# Patient Record
Sex: FEMALE | Race: WHITE | Employment: OTHER | URBAN - METROPOLITAN AREA
[De-identification: names, ages, dates, MRNs, and addresses within clinical notes are randomized per-mention and may not be internally consistent; named-entity substitution may affect disease eponyms.]

---

## 2017-02-01 DIAGNOSIS — E78.5 HYPERLIPIDEMIA LDL GOAL <130: ICD-10-CM

## 2017-02-13 ENCOUNTER — HOSPITAL ENCOUNTER (OUTPATIENT)
Dept: LAB | Age: 67
Discharge: HOME | End: 2017-02-13

## 2017-02-13 PROCEDURE — 99001 SPECIMEN HANDLING PT-LAB: CPT | Performed by: INTERNAL MEDICINE

## 2017-02-14 LAB
ALBUMIN SERPL-MCNC: 4.2 G/DL (ref 3.6–4.8)
ALBUMIN/GLOB SERPL: 1.7 {RATIO} (ref 1.1–2.5)
ALP SERPL-CCNC: 73 IU/L (ref 39–117)
ALT SERPL-CCNC: 11 IU/L (ref 0–32)
AST SERPL-CCNC: 16 IU/L (ref 0–40)
BILIRUB SERPL-MCNC: 0.5 MG/DL (ref 0–1.2)
BUN SERPL-MCNC: 15 MG/DL (ref 8–27)
BUN/CREAT SERPL: 20 (ref 11–26)
CALCIUM SERPL-MCNC: 9.7 MG/DL (ref 8.7–10.3)
CHLORIDE SERPL-SCNC: 101 MMOL/L (ref 96–106)
CHOLEST SERPL-MCNC: 209 MG/DL (ref 100–199)
CO2 SERPL-SCNC: 25 MMOL/L (ref 18–29)
CREAT SERPL-MCNC: 0.76 MG/DL (ref 0.57–1)
GLOBULIN SER CALC-MCNC: 2.5 G/DL (ref 1.5–4.5)
GLUCOSE SERPL-MCNC: 95 MG/DL (ref 65–99)
HDLC SERPL-MCNC: 55 MG/DL
INTERPRETATION, 910389: NORMAL
LDLC SERPL CALC-MCNC: 137 MG/DL (ref 0–99)
POTASSIUM SERPL-SCNC: 3.8 MMOL/L (ref 3.5–5.2)
PROT SERPL-MCNC: 6.7 G/DL (ref 6–8.5)
SODIUM SERPL-SCNC: 145 MMOL/L (ref 134–144)
TRIGL SERPL-MCNC: 85 MG/DL (ref 0–149)
VLDLC SERPL CALC-MCNC: 17 MG/DL (ref 5–40)

## 2017-02-20 ENCOUNTER — OFFICE VISIT (OUTPATIENT)
Dept: INTERNAL MEDICINE CLINIC | Age: 67
End: 2017-02-20

## 2017-02-20 VITALS
HEART RATE: 71 BPM | RESPIRATION RATE: 12 BRPM | BODY MASS INDEX: 28.67 KG/M2 | HEIGHT: 66 IN | WEIGHT: 178.4 LBS | SYSTOLIC BLOOD PRESSURE: 144 MMHG | OXYGEN SATURATION: 98 % | TEMPERATURE: 98.9 F | DIASTOLIC BLOOD PRESSURE: 78 MMHG

## 2017-02-20 DIAGNOSIS — E03.9 ACQUIRED HYPOTHYROIDISM: ICD-10-CM

## 2017-02-20 DIAGNOSIS — I10 ESSENTIAL HYPERTENSION WITH GOAL BLOOD PRESSURE LESS THAN 140/90: Primary | ICD-10-CM

## 2017-02-20 DIAGNOSIS — N39.490 OVERFLOW INCONTINENCE: ICD-10-CM

## 2017-02-20 DIAGNOSIS — E78.5 HYPERLIPIDEMIA LDL GOAL <130: ICD-10-CM

## 2017-02-20 RX ORDER — HYDROCODONE BITARTRATE AND ACETAMINOPHEN 10; 325 MG/1; MG/1
1 TABLET ORAL
Qty: 40 TAB | Refills: 0 | Status: SHIPPED | OUTPATIENT
Start: 2017-02-20 | End: 2018-02-21 | Stop reason: SDUPTHER

## 2017-02-20 RX ORDER — AMLODIPINE BESYLATE 5 MG/1
TABLET ORAL
Qty: 90 TAB | Refills: 3 | Status: SHIPPED | OUTPATIENT
Start: 2017-02-20 | End: 2018-04-11 | Stop reason: SDUPTHER

## 2017-02-20 NOTE — MR AVS SNAPSHOT
Visit Information Date & Time Provider Department Dept. Phone Encounter #  
 2/20/2017  2:30 PM Marilyn De Oliveira MD Internist of [address] [id number] Your Appointments 8/2/2017  9:25 AM  
LAB with [facility] NURSE VISIT Internist of [facility] ([facility]) Appt Note: labs for rpe rm  
 [address] [facility], [address] [place] [address] [place] [address]  
  
   
 [address], [address]  
  
    
 8/9/2017  3:00 PM  
PHYSICAL with Marilyn De Oliveira MD  
Internist of [address] [place] [address]) Appt Note: rpe 6mos.,rm  
 [address] [facility], [address] [address] [address] [facility] [address] [place] [address]  
  
   
 [address], [address]  
  
    
  
 3/6/2017  2:15 PM  
Any with Sunnie Kussmaul, MD  
[facility] of [facility] ([facility]) Appt Note: 3 MONTH FOLLOW UP  
 [address] 
[address] [place] [address]  
[address] [facility] [address] [place] [address] Upcoming Health Maintenance Date Due DTaP/Tdap/Td series (1 - Tdap) 1/10/1969 Pneumococcal 65+ Low/Medium Risk (2 of 2 - PCV13) 5/23/2015 INFLUENZA AGE 9 TO ADULT 8/1/2016 MEDICARE YEARLY EXAM 12/22/2016 GLAUCOMA SCREENING Q2Y 3/11/2017 BREAST CANCER SCRN MAMMOGRAM 11/2/2018 COLONOSCOPY 2/21/2022 Allergies as of 2/20/2017  Review Complete On: 2/20/2017 By: Marilyn De Oliveira MD  
 No Known Allergies Current Immunizations  Reviewed on 6/6/2016 Name Date Pneumococcal Polysaccharide (PPSV-23) 5/23/2014  2:29 PM  
 Zoster 4/13/2009 Not reviewed this visit You Were Diagnosed With   
  
 Codes Comments Essential hypertension with goal blood pressure less than 140/90    -  Primary ICD-10-CM: I10 
ICD-9-CM: 401.9 Hyperlipidemia LDL goal <130     ICD-10-CM: E78.5 ICD-9-CM: 272.4 Overflow incontinence     ICD-10-CM: N39.490 ICD-9-CM: 788.38 Vitals BP Pulse Temp Resp Height(growth percentile) Weight(growth percentile) 144/78 71 98.9 °F (37.2 °C) (Oral) 12 5' 6\" (1.676 m) 178 lb 6.4 oz (80.9 kg) SpO2 BMI OB Status Smoking Status 98% 28.79 kg/m2 Hysterectomy Never Smoker Vitals History BMI and BSA Data Body Mass Index Body Surface Area 28.79 kg/m 2 1.94 m 2 Preferred Pharmacy Pharmacy Name Phone [address], [facility] [address] [place] [address] [facility] [facility] [phone] Your Updated Medication List  
  
   
This list is accurate as of: 2/20/17  3:18 PM.  Always use your most recent med list.  
  
  
  
  
 ALEVE 220 mg tablet Generic drug:  naproxen sodium Take 220 mg by mouth two (2) times daily (with meals). amLODIPine 5 mg tablet Commonly known as:  Salvador Chafe TAKE 1 TABLET BY MOUTH DAILY  
  
 aspirin delayed-release 81 mg tablet Take  by mouth daily. hydroCHLOROthiazide 25 mg tablet Commonly known as:  HYDRODIURIL  
TAKE 1 TABLET DAILY HYDROcodone-acetaminophen  mg tablet Commonly known as:  Lakshmi Kris Take 1 Tab by mouth every six (6) hours as needed for Pain. Max Daily Amount: 4 Tabs. * levothyroxine 112 mcg tablet Commonly known as:  SYNTHROID  
112 mcg. * levothyroxine 112 mcg tablet Commonly known as:  SYNTHROID  
TAKE 1 TABLET DAILY BEFORE BREAKFAST  
  
 pravastatin 40 mg tablet Commonly known as:  PRAVACHOL  
TAKE 1 TABLET NIGHTLY  
  
 sertraline 100 mg tablet Commonly known as:  ZOLOFT  
TAKE 1 TABLET EVERY DAY  
  
 tolterodine ER 4 mg ER capsule Commonly known as:  Katherene Sindhu Take 1 Cap by mouth daily. VITAMIN C 500 mg tablet Generic drug:  ascorbic acid (vitamin C) Take  by mouth. * Cholecalciferol (Vitamin D3) 2,000 unit Cap capsule Commonly known as:  VITAMIN D3 Take  by Mouth Once a Day. * VITAMIN D3 1,000 unit tablet Generic drug:  cholecalciferol Take 2,000 Units by mouth daily. vitamin e 1,000 unit capsule Commonly known as:  E GEMS Take 1,000 Units by mouth daily. ZyrTEC 10 mg tablet Generic drug:  cetirizine Take  by mouth daily. * Notice: This list has 4 medication(s) that are the same as other medications prescribed for you. Read the directions carefully, and ask your doctor or other care provider to review them with you. Prescriptions Printed Refills HYDROcodone-acetaminophen (NORCO)  mg tablet 0 Sig: Take 1 Tab by mouth every six (6) hours as needed for Pain. Max Daily Amount: 4 Tabs. Class: Print Route: Oral  
  
Introducing [facility]! Dear Jyoti Lincoln: 
Thank you for requesting a [facility] account. Our records indicate that you already have an active [facility] account. You can access your account anytime at [email or web address] Did you know that you can access your hospital and ER discharge instructions at any time in [facility]? You can also review all of your test results from your hospital stay or ER visit. Additional Information If you have questions, please visit the Frequently Asked Questions section of the [facility] website at [email or web address] Remember, [facility] is NOT to be used for urgent needs. For medical emergencies, dial 911. Now available from your iPhone and Android! Please provide this summary of care documentation to your next provider. Your primary care clinician is listed as Gali Elkins. If you have any questions after today's visit, please call [phone].

## 2017-02-20 NOTE — PROGRESS NOTES
Claudell Overcast 1950, is a 69 y.o. female, who is seen today for reevaluation of hypertension hyperlipidemia anxiety and now also back pain and urinary incontinence. She has been seeing a urologist for over 6 months for urinary incontinence and has tried Botox and pills with minimal benefit. She thinks her blood pressure medicine, hydrochlorothiazide, may contribute to the continuous urinary incontinence. She is no longer on amlodipine, that was just taken preop. She had good results from her back surgery but still some pain from time to time and would like to use occasional half of the Norco.  She was prescribed that by her surgeon. She is following a healthy diet and taking her other medicine correctly. She otherwise feels well. Past Medical History   Diagnosis Date    Allergic rhinitis     Atrophic vaginitis     Back pain     Cystitis     Epigastric pain     Headache(784.0)      migraine    Hypercholesterolemia     Hypertension     Neck pain     Numbness of arm 12/18/06     left    LOREN (obstructive sleep apnea)     Phlebitis of saphenous vein 4/27/07     Left    Thyroid disease      hypothyroidism    Urinary incontinence      Current Outpatient Prescriptions   Medication Sig Dispense Refill    Cholecalciferol, Vitamin D3, (VITAMIN D3) 2,000 unit cap capsule Take  by Mouth Once a Day.  levothyroxine (SYNTHROID) 112 mcg tablet 112 mcg.  hydrochlorothiazide (HYDRODIURIL) 25 mg tablet TAKE 1 TABLET DAILY 90 Tab 3    levothyroxine (SYNTHROID) 112 mcg tablet TAKE 1 TABLET DAILY BEFORE BREAKFAST 90 Tab 3    sertraline (ZOLOFT) 100 mg tablet TAKE 1 TABLET EVERY DAY 90 Tab 3    pravastatin (PRAVACHOL) 40 mg tablet TAKE 1 TABLET NIGHTLY 90 Tab 3    aspirin delayed-release 81 mg tablet Take  by mouth daily.  vitamin e (E GEMS) 1,000 unit capsule Take 1,000 Units by mouth daily.       naproxen sodium (ALEVE) 220 mg tablet Take 220 mg by mouth two (2) times daily (with meals).  cholecalciferol, vitamin d3, (VITAMIN D) 1,000 unit tablet Take 2,000 Units by mouth daily.  amLODIPine (NORVASC) 5 mg tablet TAKE 1 TABLET BY MOUTH DAILY 90 Tab 0    HYDROcodone-acetaminophen (NORCO)  mg tablet Take 1-2 Tabs by mouth every six (6) hours as needed for Pain. Max Daily Amount: 8 Tabs. 40 Tab 0    HYDROcodone-acetaminophen (NORCO) 5-325 mg per tablet Take 1 Tab by mouth every six (6) hours as needed for Pain. Max Daily Amount: 4 Tabs. 40 Tab 0    tolterodine ER (DETROL LA) 4 mg ER capsule Take 1 Cap by mouth daily. 90 Cap 3    ascorbic acid (VITAMIN C) 500 mg tablet Take  by mouth.  cetirizine (ZYRTEC) 10 mg tablet Take  by mouth daily. Visit Vitals    /78    Pulse 71    Temp 98.9 °F (37.2 °C) (Oral)    Resp 12    Ht 5' 6\" (1.676 m)    Wt 178 lb 6.4 oz (80.9 kg)    SpO2 98%    BMI 28.79 kg/m2     Carotids are 2+ without bruits. Lungs are clear to percussion. Good breath sounds with no wheezing or crackles. Heart reveals a regular rhythm with normal S1 and S2 no murmur gallop click or rub. Apical impulse is not palpable. Abdomen is soft and nontender with no hepatosplenomegaly or masses and no bruits. Extremities reveal no clubbing cyanosis or edema. Pulses are 2+. Assessment: Hypertension fairly well controlled but she is convinced hydrochlorothiazide is contributing to her urinary incontinence, which is possible small way. We will try switching from hydrochlorothiazide to amlodipine 5 mg daily. #2. Hyperlipidemia is doing about the same. She will continue low-fat diet and pravastatin 40 mg each evening. #3. Hypothyroidism, clinically euthyroid. She will continue levothyroxine 112 µg daily. #4.  Moderate postop back pain, I have given her a few Norco to use one half when needed. #5.  Urinary incontinence that he she says is continuous.   She will follow-up with her urologist.    Follow-up in early August for a complete evaluation    Corona Francois MD FACP    Please note: This document has been produced using voice recognition software. Unrecognized errors in transcription may be present.

## 2017-04-06 DIAGNOSIS — N94.10 DYSPAREUNIA IN FEMALE: ICD-10-CM

## 2017-04-06 DIAGNOSIS — N39.46 MIXED INCONTINENCE: ICD-10-CM

## 2017-04-06 RX ORDER — PRAVASTATIN SODIUM 40 MG/1
TABLET ORAL
Qty: 90 TAB | Refills: 3 | Status: SHIPPED | OUTPATIENT
Start: 2017-04-06 | End: 2018-04-11 | Stop reason: SDUPTHER

## 2017-04-06 RX ORDER — SERTRALINE HYDROCHLORIDE 100 MG/1
TABLET, FILM COATED ORAL
Qty: 90 TAB | Refills: 3 | Status: SHIPPED | OUTPATIENT
Start: 2017-04-06 | End: 2018-03-20

## 2017-04-06 RX ORDER — LEVOTHYROXINE SODIUM 112 UG/1
TABLET ORAL
Qty: 90 TAB | Refills: 3 | Status: SHIPPED | OUTPATIENT
Start: 2017-04-06 | End: 2018-02-21 | Stop reason: SDUPTHER

## 2017-04-06 RX ORDER — HYDROCHLOROTHIAZIDE 25 MG/1
TABLET ORAL
Qty: 90 TAB | Refills: 3 | Status: ON HOLD | OUTPATIENT
Start: 2017-04-06 | End: 2018-02-13 | Stop reason: CLARIF

## 2017-04-26 ENCOUNTER — TELEPHONE (OUTPATIENT)
Dept: INTERNAL MEDICINE CLINIC | Age: 67
End: 2017-04-26

## 2017-04-26 DIAGNOSIS — I10 ESSENTIAL HYPERTENSION WITH GOAL BLOOD PRESSURE LESS THAN 140/90: Primary | ICD-10-CM

## 2017-04-26 DIAGNOSIS — Z01.00 COMPLETE EYE EXAM, ENCOUNTER FOR: ICD-10-CM

## 2017-04-26 NOTE — TELEPHONE ENCOUNTER
Referral done for Dr. Milagro Jimenez  04/26/2017-04/26/2018  99 visits  ZXZZ#[id number]  copy faxed to office and mailed out to pt  RM

## 2017-05-08 ENCOUNTER — OFFICE VISIT (OUTPATIENT)
Dept: INTERNAL MEDICINE CLINIC | Age: 67
End: 2017-05-08

## 2017-05-08 VITALS
DIASTOLIC BLOOD PRESSURE: 82 MMHG | TEMPERATURE: 98.5 F | OXYGEN SATURATION: 98 % | BODY MASS INDEX: 29.25 KG/M2 | HEART RATE: 76 BPM | HEIGHT: 66 IN | SYSTOLIC BLOOD PRESSURE: 148 MMHG | WEIGHT: 182 LBS

## 2017-05-08 DIAGNOSIS — I87.2 VENOUS INSUFFICIENCY OF LEFT LEG: Primary | ICD-10-CM

## 2017-05-08 NOTE — PROGRESS NOTES
Dev Vargas 1950, is a 69 y.o. female, who is seen today for several concerns, she sent an email yesterday that I received this morning that she had pain and blue discoloration of her left foot. For the last 4 days she has noted some slight numbness of the left second toe only but no pain. Then 3 nights ago she started having some pain at the base of the left great toe and by the end of the day that left foot was bluish and looked puffy, she took a picture of both feet at that time. She also noted a bruise on her lower lateral left leg and what looks like a possible bruise on her right fourth finger. She does not recall any injury. 3 days ago she thought she might even have gout but the toe pain is better. Past Medical History:   Diagnosis Date    Allergic rhinitis     Atrophic vaginitis     Back pain     Cystitis     Epigastric pain     Headache     migraine    Hypercholesterolemia     Hypertension     Neck pain     Numbness of arm 12/18/06    left    LOREN (obstructive sleep apnea)     Phlebitis of saphenous vein 4/27/07    Left    Thyroid disease     hypothyroidism    Urinary incontinence      Current Outpatient Prescriptions   Medication Sig Dispense Refill    pravastatin (PRAVACHOL) 40 mg tablet TAKE 1 TABLET NIGHTLY 90 Tab 3    hydroCHLOROthiazide (HYDRODIURIL) 25 mg tablet TAKE 1 TABLET DAILY 90 Tab 3    levothyroxine (SYNTHROID) 112 mcg tablet TAKE 1 TABLET DAILY BEFORE BREAKFAST 90 Tab 3    sertraline (ZOLOFT) 100 mg tablet TAKE 1 TABLET EVERY DAY 90 Tab 3    amLODIPine (NORVASC) 5 mg tablet TAKE 1 TABLET BY MOUTH DAILY 90 Tab 3    Cholecalciferol, Vitamin D3, (VITAMIN D3) 2,000 unit cap capsule Take  by Mouth Once a Day.  tolterodine ER (DETROL LA) 4 mg ER capsule Take 1 Cap by mouth daily. 90 Cap 3    levothyroxine (SYNTHROID) 112 mcg tablet TAKE 1 TABLET DAILY BEFORE BREAKFAST 90 Tab 3    ascorbic acid (VITAMIN C) 500 mg tablet Take  by mouth.       aspirin delayed-release 81 mg tablet Take  by mouth daily.  vitamin e (E GEMS) 1,000 unit capsule Take 1,000 Units by mouth daily.  cetirizine (ZYRTEC) 10 mg tablet Take  by mouth daily.  naproxen sodium (ALEVE) 220 mg tablet Take 220 mg by mouth two (2) times daily (with meals).  cholecalciferol, vitamin d3, (VITAMIN D) 1,000 unit tablet Take 2,000 Units by mouth daily.  HYDROcodone-acetaminophen (NORCO)  mg tablet Take 1 Tab by mouth every six (6) hours as needed for Pain. Max Daily Amount: 4 Tabs. 40 Tab 0     Visit Vitals    /82    Pulse 76    Temp 98.5 °F (36.9 °C) (Oral)    Ht 5' 6\" (1.676 m)    Wt 182 lb (82.6 kg)    SpO2 98%    BMI 29.38 kg/m2     Dorsalis pedis and posterior tibialis pulses are 2+ in both feet. Both feet are equally warm. There is no discoloration currently in the feet. She has prominent varicose veins on the dorsum of the left foot where she has had vein stripping proximally. No tenderness. Good range of motion of the left great toe and there is no tenderness at the base of the toe. No pain with range of motion. She does have a superficial slightly tender bruise just above the lateral malleolus on the left. The proximal fourth right finger is very slightly bluish but no tenderness or swelling. Good range of motion of the MP and PIP joints. Assessment: #1. Some puffiness of blueness that she noted especially with dependency at the end of the day related to venous insufficiency, arterial circulation is good. I recommended she use her support hose. #2.  Recent discomfort at the base of the left toe that seems to be in the MTP joint,  but normal exam now. No further evaluation. #3. She has a superficial bruise and must have bumped her lower left leg. This will clear on its own. I reassured the patient.     She is to call me if there is significant worsening of any of these problems, otherwise she will return as previously planned in August.    Corona Ulloa MD FACP    Please note: This document has been produced using voice recognition software. Unrecognized errors in transcription may be present.

## 2017-05-08 NOTE — MR AVS SNAPSHOT
Visit Information Date & Time Provider Department Dept. Phone Encounter #  
 5/8/2017 11:00 AM Henrietta Jeronimo MD Internist of [address] [id number] Your Appointments 8/2/2017  9:25 AM  
LAB with [facility] NURSE VISIT Internist of [facility] ([address]) Appt Note: labs for rpe rm  
 [address] [facility], [address] [employer] [place] [address] [place]  
  
   
 [address], [address]  
  
    
 8/9/2017  3:00 PM  
PHYSICAL with Henrietta Jeronimo MD  
Internist of [address] [facility] [address] [facility]) Appt Note: rpe 6mos.,rm  
 [address] [facility], [address] [place]  
  
   
 [address], [address] Upcoming Health Maintenance Date Due DTaP/Tdap/Td series (1 - Tdap) 1/10/1969 Pneumococcal 65+ Low/Medium Risk (2 of 2 - PCV13) 5/23/2015 MEDICARE YEARLY EXAM 12/22/2016 GLAUCOMA SCREENING Q2Y 3/11/2017 INFLUENZA AGE 9 TO ADULT 8/1/2017 BREAST CANCER SCRN MAMMOGRAM 11/2/2018 COLONOSCOPY 2/21/2022 Allergies as of 5/8/2017  Review Complete On: 5/8/2017 By: Henrietta Jeronimo MD  
 No Known Allergies Current Immunizations  Reviewed on 6/6/2016 Name Date Pneumococcal Polysaccharide (PPSV-23) 5/23/2014  2:29 PM  
 Zoster 4/13/2009 Not reviewed this visit You Were Diagnosed With   
  
 Codes Comments Venous insufficiency of left leg    -  Primary ICD-10-CM: I87.2 ICD-9-CM: 459.81 Vitals BP Pulse Temp Height(growth percentile) Weight(growth percentile) SpO2  
 148/82 76 98.5 °F (36.9 °C) (Oral) 5' 6\" (1.676 m) 182 lb (82.6 kg) 98% BMI OB Status Smoking Status 29.38 kg/m2 Hysterectomy Never Smoker Vitals History BMI and BSA Data Body Mass Index Body Surface Area  
 29.38 kg/m 2 1.96 m 2 Preferred Pharmacy Pharmacy Name Phone [address] [phone] Your Updated Medication List  
  
   
This list is accurate as of: 5/8/17 11:18 AM.  Always use your most recent med list.  
  
  
  
  
 ALEVE 220 mg tablet Generic drug:  naproxen sodium Take 220 mg by mouth two (2) times daily (with meals). amLODIPine 5 mg tablet Commonly known as:  Posada Markos TAKE 1 TABLET BY MOUTH DAILY  
  
 aspirin delayed-release 81 mg tablet Take  by mouth daily. hydroCHLOROthiazide 25 mg tablet Commonly known as:  HYDRODIURIL  
TAKE 1 TABLET DAILY HYDROcodone-acetaminophen  mg tablet Commonly known as:  Ceil Heap Take 1 Tab by mouth every six (6) hours as needed for Pain. Max Daily Amount: 4 Tabs. * levothyroxine 112 mcg tablet Commonly known as:  SYNTHROID  
TAKE 1 TABLET DAILY BEFORE BREAKFAST * levothyroxine 112 mcg tablet Commonly known as:  SYNTHROID  
TAKE 1 TABLET DAILY BEFORE BREAKFAST  
  
 pravastatin 40 mg tablet Commonly known as:  PRAVACHOL  
TAKE 1 TABLET NIGHTLY  
  
 sertraline 100 mg tablet Commonly known as:  ZOLOFT  
TAKE 1 TABLET EVERY DAY  
  
 tolterodine ER 4 mg ER capsule Commonly known as:  Ayad Robin Take 1 Cap by mouth daily. VITAMIN C 500 mg tablet Generic drug:  ascorbic acid (vitamin C) Take  by mouth. * Cholecalciferol (Vitamin D3) 2,000 unit Cap capsule Commonly known as:  VITAMIN D3 Take  by Mouth Once a Day. * VITAMIN D3 1,000 unit tablet Generic drug:  cholecalciferol Take 2,000 Units by mouth daily. vitamin e 1,000 unit capsule Commonly known as:  E GEMS Take 1,000 Units by mouth daily. ZyrTEC 10 mg tablet Generic drug:  cetirizine Take  by mouth daily. * Notice: This list has 4 medication(s) that are the same as other medications prescribed for you. Read the directions carefully, and ask your doctor or other care provider to review them with you. Introducing [facility]! Dear Anthony Spear: 
Thank you for requesting a [employer] account. Our records indicate that you already have an active [employer] account. You can access your account anytime at [email or web address] Did you know that you can access your hospital and ER discharge instructions at any time in [employer]? You can also review all of your test results from your hospital stay or ER visit. Additional Information If you have questions, please visit the Frequently Asked Questions section of the [employer] website at [email or web address] Remember, [employer] is NOT to be used for urgent needs. For medical emergencies, dial 911. Now available from your iPhone and Android! Please provide this summary of care documentation to your next provider. Your primary care clinician is listed as Vania Gaona. Thao Bragg. If you have any questions after today's visit, please call [phone].

## 2017-06-28 ENCOUNTER — TELEPHONE (OUTPATIENT)
Dept: INTERNAL MEDICINE CLINIC | Age: 67
End: 2017-06-28

## 2017-06-28 DIAGNOSIS — M79.89 TOE SWELLING: ICD-10-CM

## 2017-06-28 DIAGNOSIS — M79.673 PAIN OF FOOT, UNSPECIFIED LATERALITY: Primary | ICD-10-CM

## 2017-06-28 NOTE — TELEPHONE ENCOUNTER
Kermit-referral to  Dr Melony Hess pain-, swollen toe-  appt   Not set till they get referral    Fax #  [phone]

## 2017-08-01 DIAGNOSIS — I10 ESSENTIAL HYPERTENSION WITH GOAL BLOOD PRESSURE LESS THAN 140/90: ICD-10-CM

## 2017-08-01 DIAGNOSIS — E78.5 HYPERLIPIDEMIA LDL GOAL <130: ICD-10-CM

## 2017-08-01 DIAGNOSIS — E03.9 ACQUIRED HYPOTHYROIDISM: ICD-10-CM

## 2017-08-02 DIAGNOSIS — I10 ESSENTIAL HYPERTENSION WITH GOAL BLOOD PRESSURE LESS THAN 140/90: Primary | ICD-10-CM

## 2017-08-02 DIAGNOSIS — E03.9 ACQUIRED HYPOTHYROIDISM: ICD-10-CM

## 2017-08-03 LAB
ALBUMIN SERPL-MCNC: 3.9 G/DL (ref 3.6–4.8)
ALBUMIN/GLOB SERPL: 1.5 {RATIO} (ref 1.2–2.2)
ALP SERPL-CCNC: 74 IU/L (ref 39–117)
ALT SERPL-CCNC: 6 IU/L (ref 0–32)
AST SERPL-CCNC: 18 IU/L (ref 0–40)
BASOPHILS # BLD AUTO: 0.1 X10E3/UL (ref 0–0.2)
BASOPHILS NFR BLD AUTO: 1 %
BILIRUB SERPL-MCNC: 0.4 MG/DL (ref 0–1.2)
BUN SERPL-MCNC: 13 MG/DL (ref 8–27)
BUN/CREAT SERPL: 19 (ref 12–28)
CALCIUM SERPL-MCNC: 9.3 MG/DL (ref 8.7–10.3)
CHLORIDE SERPL-SCNC: 104 MMOL/L (ref 96–106)
CHOLEST SERPL-MCNC: 178 MG/DL (ref 100–199)
CO2 SERPL-SCNC: 26 MMOL/L (ref 18–29)
CREAT SERPL-MCNC: 0.67 MG/DL (ref 0.57–1)
EOSINOPHIL # BLD AUTO: 0.2 X10E3/UL (ref 0–0.4)
EOSINOPHIL NFR BLD AUTO: 4 %
ERYTHROCYTE [DISTWIDTH] IN BLOOD BY AUTOMATED COUNT: 13.4 % (ref 12.3–15.4)
GLOBULIN SER CALC-MCNC: 2.6 G/DL (ref 1.5–4.5)
GLUCOSE SERPL-MCNC: 94 MG/DL (ref 65–99)
HCT VFR BLD AUTO: 40.7 % (ref 34–46.6)
HDLC SERPL-MCNC: 51 MG/DL
HGB BLD-MCNC: 12.7 G/DL (ref 11.1–15.9)
IMM GRANULOCYTES # BLD: 0 X10E3/UL (ref 0–0.1)
IMM GRANULOCYTES NFR BLD: 0 %
INTERPRETATION, 910389: NORMAL
LDLC SERPL CALC-MCNC: 106 MG/DL (ref 0–99)
LYMPHOCYTES # BLD AUTO: 1.8 X10E3/UL (ref 0.7–3.1)
LYMPHOCYTES NFR BLD AUTO: 31 %
MCH RBC QN AUTO: 28.7 PG (ref 26.6–33)
MCHC RBC AUTO-ENTMCNC: 31.2 G/DL (ref 31.5–35.7)
MCV RBC AUTO: 92 FL (ref 79–97)
MONOCYTES # BLD AUTO: 0.7 X10E3/UL (ref 0.1–0.9)
MONOCYTES NFR BLD AUTO: 11 %
NEUTROPHILS # BLD AUTO: 3 X10E3/UL (ref 1.4–7)
NEUTROPHILS NFR BLD AUTO: 53 %
PLATELET # BLD AUTO: 330 X10E3/UL (ref 150–379)
POTASSIUM SERPL-SCNC: 4.4 MMOL/L (ref 3.5–5.2)
PROT SERPL-MCNC: 6.5 G/DL (ref 6–8.5)
RBC # BLD AUTO: 4.43 X10E6/UL (ref 3.77–5.28)
SODIUM SERPL-SCNC: 144 MMOL/L (ref 134–144)
SPECIMEN STATUS REPORT, ROLRST: NORMAL
T4 FREE SERPL-MCNC: 1.56 NG/DL (ref 0.82–1.77)
TRIGL SERPL-MCNC: 103 MG/DL (ref 0–149)
TSH SERPL DL<=0.005 MIU/L-ACNC: 0.17 UIU/ML (ref 0.45–4.5)
VIT B12 SERPL-MCNC: 308 PG/ML (ref 211–946)
VLDLC SERPL CALC-MCNC: 21 MG/DL (ref 5–40)
WBC # BLD AUTO: 5.7 X10E3/UL (ref 3.4–10.8)

## 2017-08-09 ENCOUNTER — OFFICE VISIT (OUTPATIENT)
Dept: INTERNAL MEDICINE CLINIC | Age: 67
End: 2017-08-09

## 2017-08-09 VITALS
OXYGEN SATURATION: 97 % | HEIGHT: 66 IN | BODY MASS INDEX: 27 KG/M2 | SYSTOLIC BLOOD PRESSURE: 138 MMHG | RESPIRATION RATE: 12 BRPM | HEART RATE: 69 BPM | DIASTOLIC BLOOD PRESSURE: 78 MMHG | WEIGHT: 168 LBS | TEMPERATURE: 98.5 F

## 2017-08-09 DIAGNOSIS — E55.9 HYPOVITAMINOSIS D: ICD-10-CM

## 2017-08-09 DIAGNOSIS — E03.9 ACQUIRED HYPOTHYROIDISM: ICD-10-CM

## 2017-08-09 DIAGNOSIS — Z00.00 ROUTINE GENERAL MEDICAL EXAMINATION AT A HEALTH CARE FACILITY: Primary | ICD-10-CM

## 2017-08-09 DIAGNOSIS — I10 ESSENTIAL HYPERTENSION WITH GOAL BLOOD PRESSURE LESS THAN 140/90: ICD-10-CM

## 2017-08-09 DIAGNOSIS — E78.5 HYPERLIPIDEMIA LDL GOAL <130: ICD-10-CM

## 2017-08-09 NOTE — PROGRESS NOTES
Roberta Calhoun 1950, is a 69 y.o. female, who is seen today for routine physical exam and follow-up on hypertension hyper lipidemia chronic anxiety and depression hypothyroidism. She generally is feeling quite well. She is breathing well with no chest pain. She takes all her medicine. She is trying to keep her weight down. Her biggest complaint is ongoing urinary incontinence which did not respond to Detrol and has not responded very well to Botox. Past Medical History:   Diagnosis Date    Allergic rhinitis     Atrophic vaginitis     Back pain     Cystitis     Epigastric pain     Headache     migraine    Hypercholesterolemia     Hypertension     Neck pain     Numbness of arm 12/18/06    left    LOREN (obstructive sleep apnea)     Phlebitis of saphenous vein 4/27/07    Left    Thyroid disease     hypothyroidism    Urinary incontinence      Past Surgical History:   Procedure Laterality Date    HX CERVICAL DISKECTOMY  6/7/2016    HX COLONOSCOPY  approximately 2013    HX HYSTERECTOMY  approximately 1985    HX ORTHOPAEDIC  2006    right knee surgery     Current Outpatient Prescriptions   Medication Sig Dispense Refill    pravastatin (PRAVACHOL) 40 mg tablet TAKE 1 TABLET NIGHTLY 90 Tab 3    hydroCHLOROthiazide (HYDRODIURIL) 25 mg tablet TAKE 1 TABLET DAILY 90 Tab 3    levothyroxine (SYNTHROID) 112 mcg tablet TAKE 1 TABLET DAILY BEFORE BREAKFAST 90 Tab 3    sertraline (ZOLOFT) 100 mg tablet TAKE 1 TABLET EVERY DAY 90 Tab 3    HYDROcodone-acetaminophen (NORCO)  mg tablet Take 1 Tab by mouth every six (6) hours as needed for Pain. Max Daily Amount: 4 Tabs. 40 Tab 0    amLODIPine (NORVASC) 5 mg tablet TAKE 1 TABLET BY MOUTH DAILY 90 Tab 3    Cholecalciferol, Vitamin D3, (VITAMIN D3) 2,000 unit cap capsule Take  by Mouth Once a Day.  tolterodine ER (DETROL LA) 4 mg ER capsule Take 1 Cap by mouth daily.  90 Cap 3    levothyroxine (SYNTHROID) 112 mcg tablet TAKE 1 TABLET DAILY BEFORE BREAKFAST 90 Tab 3    ascorbic acid (VITAMIN C) 500 mg tablet Take  by mouth.  aspirin delayed-release 81 mg tablet Take  by mouth daily.  vitamin e (E GEMS) 1,000 unit capsule Take 1,000 Units by mouth daily.  cetirizine (ZYRTEC) 10 mg tablet Take  by mouth daily.  naproxen sodium (ALEVE) 220 mg tablet Take 220 mg by mouth two (2) times daily (with meals).  cholecalciferol, vitamin d3, (VITAMIN D) 1,000 unit tablet Take 2,000 Units by mouth daily. No Known Allergies  Social History     Social History    Marital status:      Spouse name: N/A    Number of children: N/A    Years of education: N/A     Social History Main Topics    Smoking status: Never Smoker    Smokeless tobacco: Never Used    Alcohol use No      Comment: rarely    Drug use: No    Sexual activity: Yes     Partners: Male     Birth control/ protection: Surgical     Other Topics Concern    None     Social History Narrative     Visit Vitals    /78    Pulse 69    Temp 98.5 °F (36.9 °C) (Oral)    Resp 12    Ht 5' 6\" (1.676 m)    Wt 168 lb (76.2 kg)    SpO2 97%    BMI 27.12 kg/m2     The patient is a well-developed well-nourished female in no apparent distress. HEENT: Pupils are equal and react to light and extraocular movements are full. Ear canals and tympanic membranes appear normal. Oral cavity appears normal with no oral lesions. Neck: Carotids are 2+ without bruits. No adenopathy or thyromegaly. Lungs are clear to percussion. I hear no wheezing, rales or rhonchi. Heart reveals a regular rhythm with no murmur, gallop, click or rub. The apical impulse is in the fifth interspace at the midclavicular line. Abdomen is soft and nontender with no hepatosplenomegaly or masses. Bowel sounds are normoactive and there is no distention or tympany. Extremities reveal no clubbing cyanosis or edema. Pulses are 2+. Skin reveals no suspicious skin growths.  Breasts reveal no masses, skin or nipple abnormalities. No axillary adenopathy. Results for orders placed or performed in visit on 81/19/21   METABOLIC PANEL, COMPREHENSIVE   Result Value Ref Range    Glucose 94 65 - 99 mg/dL    BUN 13 8 - 27 mg/dL    Creatinine 0.67 0.57 - 1.00 mg/dL    GFR est non-AA 90 >59 mL/min/1.73    GFR est  >59 mL/min/1.73    BUN/Creatinine ratio 19 12 - 28    Sodium 144 134 - 144 mmol/L    Potassium 4.4 3.5 - 5.2 mmol/L    Chloride 104 96 - 106 mmol/L    CO2 26 18 - 29 mmol/L    Calcium 9.3 8.7 - 10.3 mg/dL    Protein, total 6.5 6.0 - 8.5 g/dL    Albumin 3.9 3.6 - 4.8 g/dL    GLOBULIN, TOTAL 2.6 1.5 - 4.5 g/dL    A-G Ratio 1.5 1.2 - 2.2    Bilirubin, total 0.4 0.0 - 1.2 mg/dL    Alk. phosphatase 74 39 - 117 IU/L    AST (SGOT) 18 0 - 40 IU/L    ALT (SGPT) 6 0 - 32 IU/L   LIPID PANEL   Result Value Ref Range    Cholesterol, total 178 100 - 199 mg/dL    Triglyceride 103 0 - 149 mg/dL    HDL Cholesterol 51 >39 mg/dL    VLDL, calculated 21 5 - 40 mg/dL    LDL, calculated 106 (H) 0 - 99 mg/dL   TSH 3RD GENERATION   Result Value Ref Range    TSH 0.171 (L) 0.450 - 4.500 uIU/mL   T4, FREE   Result Value Ref Range    T4, Free 1.56 0.82 - 1.77 ng/dL   VITAMIN B12   Result Value Ref Range    Vitamin B12 308 211 - 946 pg/mL   CVD REPORT   Result Value Ref Range    INTERPRETATION Note    CBC WITH AUTOMATED DIFF   Result Value Ref Range    WBC 5.7 3.4 - 10.8 x10E3/uL    RBC 4.43 3.77 - 5.28 x10E6/uL    HGB 12.7 11.1 - 15.9 g/dL    HCT 40.7 34.0 - 46.6 %    MCV 92 79 - 97 fL    MCH 28.7 26.6 - 33.0 pg    MCHC 31.2 (L) 31.5 - 35.7 g/dL    RDW 13.4 12.3 - 15.4 %    PLATELET 811 514 - 229 x10E3/uL    NEUTROPHILS 53 %    Lymphocytes 31 %    MONOCYTES 11 %    EOSINOPHILS 4 %    BASOPHILS 1 %    ABS. NEUTROPHILS 3.0 1.4 - 7.0 x10E3/uL    Abs Lymphocytes 1.8 0.7 - 3.1 x10E3/uL    ABS. MONOCYTES 0.7 0.1 - 0.9 x10E3/uL    ABS. EOSINOPHILS 0.2 0.0 - 0.4 x10E3/uL    ABS.  BASOPHILS 0.1 0.0 - 0.2 x10E3/uL    IMMATURE GRANULOCYTES 0 % ABS. IMM. GRANS. 0.0 0.0 - 0.1 x10E3/uL   SPECIMEN STATUS REPORT   Result Value Ref Range    SPECIMEN STATUS REPORT COMMENT      Assessment: #1. Hyperlipidemia doing quite well. She will continue pravastatin 40 mg each evening. #2. Hypertension is controlled. She will continue amlodipine 5 mg daily and hydrochlorothiazide 25 mg daily. She will continue no added salt diet. #3. Hypothyroidism doing well, she will continue levothyroxine 112 mcg daily. #4.  Urinary incontinence, have recommended she follow-up with her urologist, I really have nothing else that I can do to help her with the symptoms. #5.  History of anxiety and some depression doing well. She will continue sertraline 100 mg daily. Follow-up in 6 months with lab or sooner if needed. Corona Srivastava MD FACP    Please note: This document has been produced using voice recognition software. Unrecognized errors in transcription may be present.

## 2017-08-09 NOTE — MR AVS SNAPSHOT
Visit Information Date & Time Provider Department Dept. Phone Encounter #  
 8/9/2017  3:00 PM Lizzy Pang MD Internist of [address] [id number] Your Appointments 2/7/2018  8:55 AM  
LAB with [facility] NURSE VISIT Internist of [facility] ([facility]) Appt Note: labs [address], [address] [place] [address] [address] [place]  
  
   
 [address], [address]  
  
    
 2/14/2018  3:30 PM  
Office Visit with Lizzy Pang MD  
Internist of [address] [place] [address]) Appt Note: ov 6mos. rm  
 [address], [address] [place] [address] [address] [place]  
  
   
 [address], [address] Upcoming Health Maintenance Date Due DTaP/Tdap/Td series (1 - Tdap) 1/10/1969 Pneumococcal 65+ Low/Medium Risk (2 of 2 - PCV13) 5/23/2015 MEDICARE YEARLY EXAM 12/22/2016 GLAUCOMA SCREENING Q2Y 3/11/2017 INFLUENZA AGE 9 TO ADULT 8/1/2017 BREAST CANCER SCRN MAMMOGRAM 11/2/2018 COLONOSCOPY 2/21/2022 Allergies as of 8/9/2017  Review Complete On: 8/9/2017 By: Lizzy Pang MD  
 No Known Allergies Current Immunizations  Reviewed on 8/9/2017 Name Date Pneumococcal Polysaccharide (PPSV-23) 5/23/2014  2:29 PM  
 Zoster 4/13/2009 Reviewed by Lizzy Pang MD on 8/9/2017 at  3:46 PM  
You Were Diagnosed With   
  
 Codes Comments Essential hypertension with goal blood pressure less than 140/90    -  Primary ICD-10-CM: I10 
ICD-9-CM: 401.9 Hyperlipidemia LDL goal <130     ICD-10-CM: E78.5 ICD-9-CM: 272.4 Hypovitaminosis D     ICD-10-CM: E55.9 ICD-9-CM: 268.9 Acquired hypothyroidism     ICD-10-CM: E03.9 ICD-9-CM: 412. 9 Vitals BP Pulse Temp Resp Height(growth percentile) Weight(growth percentile) 138/78 69 98.5 °F (36.9 °C) (Oral) 12 5' 6\" (1.676 m) 168 lb (76.2 kg) SpO2 BMI OB Status Smoking Status 97% 27.12 kg/m2 Hysterectomy Never Smoker Vitals History BMI and BSA Data Body Mass Index Body Surface Area  
 27.12 kg/m 2 1.88 m 2 Preferred Pharmacy Pharmacy Name Phone [facility] [address] [place] [address] [facility] [address] [place] [address] [phone] Your Updated Medication List  
  
   
This list is accurate as of: 8/9/17  4:14 PM.  Always use your most recent med list.  
  
  
  
  
 ALEVE 220 mg tablet Generic drug:  naproxen sodium Take 220 mg by mouth two (2) times daily (with meals). amLODIPine 5 mg tablet Commonly known as:  [place] TAKE 1 TABLET BY MOUTH DAILY  
  
 aspirin delayed-release 81 mg tablet Take  by mouth daily. hydroCHLOROthiazide 25 mg tablet Commonly known as:  HYDRODIURIL  
TAKE 1 TABLET DAILY HYDROcodone-acetaminophen  mg tablet Commonly known as:  [address] Take 1 Tab by mouth every six (6) hours as needed for Pain. Max Daily Amount: 4 Tabs. * levothyroxine 112 mcg tablet Commonly known as:  SYNTHROID  
TAKE 1 TABLET DAILY BEFORE BREAKFAST * levothyroxine 112 mcg tablet Commonly known as:  SYNTHROID  
TAKE 1 TABLET DAILY BEFORE BREAKFAST  
  
 pravastatin 40 mg tablet Commonly known as:  PRAVACHOL  
TAKE 1 TABLET NIGHTLY  
  
 sertraline 100 mg tablet Commonly known as:  ZOLOFT  
TAKE 1 TABLET EVERY DAY  
  
 tolterodine ER 4 mg ER capsule Commonly known as:  Sarah Bacilio Take 1 Cap by mouth daily. VITAMIN C 500 mg tablet Generic drug:  ascorbic acid (vitamin C) Take  by mouth. * Cholecalciferol (Vitamin D3) 2,000 unit Cap capsule Commonly known as:  VITAMIN D3 Take  by Mouth Once a Day. * VITAMIN D3 1,000 unit tablet Generic drug:  cholecalciferol Take 2,000 Units by mouth daily. vitamin e 1,000 unit capsule Commonly known as:  E GEMS Take 1,000 Units by mouth daily. ZyrTEC 10 mg tablet Generic drug:  cetirizine Take  by mouth daily. * Notice: This list has 4 medication(s) that are the same as other medications prescribed for you. Read the directions carefully, and ask your doctor or other care provider to review them with you. Introducing [facility]! Dear Donal Cline: 
Thank you for requesting a Ko[employer] account. Our records indicate that you already have an active Koinify account. You can access your account anytime at [email or web address] Did you know that you can access your hospital and ER discharge instructions at any time in Koinify? You can also review all of your test results from your hospital stay or ER visit. Additional Information If you have questions, please visit the Frequently Asked Questions section of the Koin[employer] website at [email or web address] Remember, Koinify is NOT to be used for urgent needs. For medical emergencies, dial 911. Now available from your iPhone and Android! Please provide this summary of care documentation to your next provider. Your primary care clinician is listed as Miguel Peralta. If you have any questions after today's visit, please call [phone].

## 2017-09-12 ENCOUNTER — OFFICE VISIT (OUTPATIENT)
Dept: INTERNAL MEDICINE CLINIC | Age: 67
End: 2017-09-12

## 2017-09-12 VITALS
SYSTOLIC BLOOD PRESSURE: 128 MMHG | OXYGEN SATURATION: 97 % | TEMPERATURE: 98.3 F | WEIGHT: 174.6 LBS | RESPIRATION RATE: 14 BRPM | HEIGHT: 66 IN | HEART RATE: 79 BPM | DIASTOLIC BLOOD PRESSURE: 82 MMHG | BODY MASS INDEX: 28.06 KG/M2

## 2017-09-12 DIAGNOSIS — J30.89 ENVIRONMENTAL AND SEASONAL ALLERGIES: ICD-10-CM

## 2017-09-12 DIAGNOSIS — J22 ACUTE RESPIRATORY INFECTION: Primary | ICD-10-CM

## 2017-09-12 RX ORDER — ALBUTEROL SULFATE 90 UG/1
1-2 AEROSOL, METERED RESPIRATORY (INHALATION)
Qty: 1 INHALER | Refills: 0 | Status: ON HOLD | OUTPATIENT
Start: 2017-09-12 | End: 2018-02-13 | Stop reason: CLARIF

## 2017-09-12 RX ORDER — BENZONATATE 200 MG/1
200 CAPSULE ORAL
Qty: 30 CAP | Refills: 0 | Status: SHIPPED | OUTPATIENT
Start: 2017-09-12 | End: 2018-02-21 | Stop reason: SDUPTHER

## 2017-09-12 RX ORDER — DOXYCYCLINE 100 MG/1
100 CAPSULE ORAL 2 TIMES DAILY
Qty: 20 CAP | Refills: 0 | Status: SHIPPED | OUTPATIENT
Start: 2017-09-12 | End: 2017-09-22

## 2017-09-12 NOTE — PROGRESS NOTES
HPI/History  Rubén Rutledge is a 69 y.o.  female who presents for evaluation. Pt c/o cough and intermittent nasal congestion for 9 days. Pt reports \"allergies to everything except 1 tree\" and thinks allergies are the reason for her nasal congestion; present in the morning but improves per pt. Cough has been nonproductive but more recently with clear mucus/sputum. No known wheeze. No fevers. No malaise or other sxs or complaints. She has not used her zyrtec. She denies exposures. She will vacationing in her RV for some time and leaving in the next few days. Patient Active Problem List   Diagnosis Code    LOREN (obstructive sleep apnea) G47.33    Allergic rhinitis J30.9    Atrophic vaginitis N95.2    Headache R51    Depression F32.9    Shoulder pain, left M25.512    Acquired hypothyroidism E03.9    Hypovitaminosis D E55.9    Essential hypertension with goal blood pressure less than 140/90 I10    Superficial phlebitis of left leg I80.02    Mixed incontinence N39.46    Degenerative disc disease, cervical M50.30    Hyperlipidemia LDL goal <130 E78.5    Urinary incontinence R32     Past Medical History:   Diagnosis Date    Allergic rhinitis     Atrophic vaginitis     Back pain     Cystitis     Epigastric pain     Headache     migraine    Hypercholesterolemia     Hypertension     Neck pain     Numbness of arm 12/18/06    left    LOREN (obstructive sleep apnea)     Phlebitis of saphenous vein 4/27/07    Left    Thyroid disease     hypothyroidism    Urinary incontinence      Past Surgical History:   Procedure Laterality Date    HX CERVICAL DISKECTOMY  6/7/2016    HX COLONOSCOPY  approximately 2013    HX HYSTERECTOMY  approximately 1985    HX ORTHOPAEDIC  2006    right knee surgery     Social History     Social History    Marital status:      Spouse name: N/A    Number of children: N/A    Years of education: N/A     Occupational History    Not on file.      Social History Main Topics    Smoking status: Never Smoker    Smokeless tobacco: Never Used    Alcohol use No      Comment: rarely    Drug use: No    Sexual activity: Yes     Partners: Male     Birth control/ protection: Surgical     Other Topics Concern    Not on file     Social History Narrative     Family History   Problem Relation Age of Onset    Hypertension Mother     Heart Disease Father 62    Hypertension Father     Alzheimer Father     Cancer Other      Unsure    Other Other      cerebral aneurysm     Current Outpatient Prescriptions   Medication Sig    doxycycline (VIBRAMYCIN) 100 mg capsule Take 1 Cap by mouth two (2) times a day for 10 days.  albuterol (PROVENTIL HFA, VENTOLIN HFA, PROAIR HFA) 90 mcg/actuation inhaler Take 1-2 Puffs by inhalation every four (4) hours as needed for Wheezing.  benzonatate (TESSALON) 200 mg capsule Take 1 Cap by mouth three (3) times daily as needed for Cough.  pravastatin (PRAVACHOL) 40 mg tablet TAKE 1 TABLET NIGHTLY    levothyroxine (SYNTHROID) 112 mcg tablet TAKE 1 TABLET DAILY BEFORE BREAKFAST    sertraline (ZOLOFT) 100 mg tablet TAKE 1 TABLET EVERY DAY    amLODIPine (NORVASC) 5 mg tablet TAKE 1 TABLET BY MOUTH DAILY    Cholecalciferol, Vitamin D3, (VITAMIN D3) 2,000 unit cap capsule Take  by Mouth Once a Day.  levothyroxine (SYNTHROID) 112 mcg tablet TAKE 1 TABLET DAILY BEFORE BREAKFAST    aspirin delayed-release 81 mg tablet Take  by mouth daily.  vitamin e (E GEMS) 1,000 unit capsule Take 1,000 Units by mouth daily.  naproxen sodium (ALEVE) 220 mg tablet Take 220 mg by mouth two (2) times daily (with meals).  hydroCHLOROthiazide (HYDRODIURIL) 25 mg tablet TAKE 1 TABLET DAILY    HYDROcodone-acetaminophen (NORCO)  mg tablet Take 1 Tab by mouth every six (6) hours as needed for Pain. Max Daily Amount: 4 Tabs.  tolterodine ER (DETROL LA) 4 mg ER capsule Take 1 Cap by mouth daily.     ascorbic acid (VITAMIN C) 500 mg tablet Take by mouth.  cetirizine (ZYRTEC) 10 mg tablet Take  by mouth daily.  cholecalciferol, vitamin d3, (VITAMIN D) 1,000 unit tablet Take 2,000 Units by mouth daily. No current facility-administered medications for this visit. No Known Allergies    Review of Systems  Aside from those included in HPI, remainder of complete ROS negative. Physical Examination  Visit Vitals    /82 (BP 1 Location: Right arm, BP Patient Position: Sitting)    Pulse 79    Temp 98.3 °F (36.8 °C) (Oral)    Resp 14    Ht 5' 6\" (1.676 m)    Wt 174 lb 9.6 oz (79.2 kg)    SpO2 97%    BMI 28.18 kg/m2       General - Alert and in no acute distress. Pt appears well, comfortable, and in good spirits. Pleasant, engaging. Nontoxic. Not anxious, non-diaphoretic. Mental status - Appropriate mood, behavior, speech content, dress, and thought processes. Eyes - Pupils equal and reactive, extraocular movements intact. No erythema or discharge. Ears - Auditory canals and TMs unremarkable. Nose - No erythema. No rhinorrhea. Mouth - Mucous membranes moist. Pharynx without lesions, swelling, erythema, or exudate. Neck - Supple without rigidity. Lymph - No periauricular, perimandibular, or ant/post cervical tenderness or swelling. Pulm - Paroxysmal dry hacking cough. Full, complete sentences. No tachypnea, retractions, or cyanosis. Good respiratory effort. Clear to auscultation bilat. No appreciable wheezes, rales, or rhonchi. Cardiovascular - Normal rate, regular rhythm. No appreciable murmurs or gallops. Assessment and Plan  1. Potential for acute respiratory infection and/or reactivity with current weather, allergies, and irritants. No overt signs of bacterial progression currently. Will treat with albuterol, tessalon, and other supportive measures. Resume zyrtec for next few weeks. Monitor closely and I printed doxycycline if worsening or no improvement. Discussed course and prognosis. Further planning as warranted.  Pt happily agrees with plan. PLEASE NOTE:   This document has been produced using voice recognition software. Unrecognized errors in transcription may be present.     Para Number [employer] of [address] [place] [address]  [phone]  9/12/2017

## 2017-09-12 NOTE — MR AVS SNAPSHOT
Visit Information Date & Time Provider Department Dept. Phone Encounter #  
 9/12/2017 11:30 AM Bhavesh Coker Internist of [address] [id number] Your Appointments 2/7/2018  8:55 AM  
LAB with [facility] NURSE VISIT Internist of [facility] ([address]) Appt Note: labs [address], [address] [place] [address] [place] [address] [address] [place]  
  
   
 [address], [address]  
  
    
 2/14/2018  3:30 PM  
Office Visit with Mellisa Burkett MD  
Internist of [address] [place] [address] [facility] [facility]) Appt Note: ov 6mos. rm  
 [address], [address] [place] [address] [address] [place]  
  
   
 [address], [address] Upcoming Health Maintenance Date Due DTaP/Tdap/Td series (1 - Tdap) 1/10/1969 Pneumococcal 65+ Low/Medium Risk (2 of 2 - PCV13) 5/23/2015 MEDICARE YEARLY EXAM 12/22/2016 GLAUCOMA SCREENING Q2Y 3/11/2017 INFLUENZA AGE 9 TO ADULT 8/1/2017 BREAST CANCER SCRN MAMMOGRAM 11/2/2018 COLONOSCOPY 2/21/2022 Allergies as of 9/12/2017  Review Complete On: 9/12/2017 By: MELONIE Coker No Known Allergies Current Immunizations  Reviewed on 8/9/2017 Name Date Pneumococcal Polysaccharide (PPSV-23) 5/23/2014  2:29 PM  
 Zoster 4/13/2009 Not reviewed this visit You Were Diagnosed With   
  
 Codes Comments Acute respiratory infection    -  Primary ICD-10-CM: Zach Pedroza ICD-9-CM: 519.8 Vitals BP Pulse Temp Resp Height(growth percentile) Weight(growth percentile) 128/82 (BP 1 Location: Right arm, BP Patient Position: Sitting) 79 98.3 °F (36.8 °C) (Oral) 14 5' 6\" (1.676 m) 174 lb 9.6 oz (79.2 kg) SpO2 BMI OB Status Smoking Status 97% 28.18 kg/m2 Hysterectomy Never Smoker Vitals History BMI and BSA Data Body Mass Index Body Surface Area  
 28.18 kg/m 2 1.92 m 2 Preferred Pharmacy Pharmacy Name Phone [address], [place] [facility] [phone] Your Updated Medication List  
  
   
This list is accurate as of: 9/12/17 11:48 AM.  Always use your most recent med list.  
  
  
  
  
 albuterol 90 mcg/actuation inhaler Commonly known as:  PROVENTIL HFA, VENTOLIN HFA, PROAIR HFA Take 1-2 Puffs by inhalation every four (4) hours as needed for Wheezing. ALEVE 220 mg tablet Generic drug:  naproxen sodium Take 220 mg by mouth two (2) times daily (with meals). amLODIPine 5 mg tablet Commonly known as:  [place] TAKE 1 TABLET BY MOUTH DAILY  
  
 aspirin delayed-release 81 mg tablet Take  by mouth daily. benzonatate 200 mg capsule Commonly known as:  TESSALON Take 1 Cap by mouth three (3) times daily as needed for Cough. doxycycline 100 mg capsule Commonly known as:  VIBRAMYCIN Take 1 Cap by mouth two (2) times a day for 10 days. hydroCHLOROthiazide 25 mg tablet Commonly known as:  HYDRODIURIL  
TAKE 1 TABLET DAILY HYDROcodone-acetaminophen  mg tablet Commonly known as:  [place] Rigoberto Take 1 Tab by mouth every six (6) hours as needed for Pain. Max Daily Amount: 4 Tabs. * levothyroxine 112 mcg tablet Commonly known as:  SYNTHROID  
TAKE 1 TABLET DAILY BEFORE BREAKFAST * levothyroxine 112 mcg tablet Commonly known as:  SYNTHROID  
TAKE 1 TABLET DAILY BEFORE BREAKFAST  
  
 pravastatin 40 mg tablet Commonly known as:  PRAVACHOL  
TAKE 1 TABLET NIGHTLY  
  
 sertraline 100 mg tablet Commonly known as:  ZOLOFT  
TAKE 1 TABLET EVERY DAY  
  
 tolterodine ER 4 mg ER capsule Commonly known as:  Jake Schjcarloske Take 1 Cap by mouth daily. VITAMIN C 500 mg tablet Generic drug:  ascorbic acid (vitamin C) Take  by mouth. * Cholecalciferol (Vitamin D3) 2,000 unit Cap capsule Commonly known as:  VITAMIN D3 Take  by Mouth Once a Day. * VITAMIN D3 1,000 unit tablet Generic drug:  cholecalciferol Take 2,000 Units by mouth daily. vitamin e 1,000 unit capsule Commonly known as:  E GEMS Take 1,000 Units by mouth daily. ZyrTEC 10 mg tablet Generic drug:  cetirizine Take  by mouth daily. * Notice: This list has 4 medication(s) that are the same as other medications prescribed for you. Read the directions carefully, and ask your doctor or other care provider to review them with you. Prescriptions Printed Refills  
 doxycycline (VIBRAMYCIN) 100 mg capsule 0 Sig: Take 1 Cap by mouth two (2) times a day for 10 days. Class: Print Route: Oral  
  
Prescriptions Sent to Pharmacy Refills  
 albuterol (PROVENTIL HFA, VENTOLIN HFA, PROAIR HFA) 90 mcg/actuation inhaler 0 Sig: Take 1-2 Puffs by inhalation every four (4) hours as needed for Wheezing. Class: Normal  
 Pharmacy: [address] [place] [address] Ph #: [phone] Route: Inhalation  
 benzonatate (TESSALON) 200 mg capsule 0 Sig: Take 1 Cap by mouth three (3) times daily as needed for Cough. Class: Normal  
 Pharmacy: [address] [place] [address] Ph #: [phone] Route: Oral  
  
Introducing [facility]! Dear Arnlo Mujica: 
Thank you for requesting a Auto I.D. account. Our records indicate that you already have an active Auto I.[employer]. account. You can access your account anytime at [email or web address] Did you know that you can access your hospital and ER discharge instructions at any time in Auto I.D.? You can also review all of your test results from your hospital stay or ER visit. Additional Information If you have questions, please visit the Frequently Asked Questions section of the Auto I.[employer]. website at [email or web address] Remember, Auto I.D. is NOT to be used for urgent needs.  For medical emergencies, dial 911. Now available from your iPhone and Android! Please provide this summary of care documentation to your next provider. Your primary care clinician is listed as Michael Boothe. If you have any questions after today's visit, please call [phone].

## 2017-09-12 NOTE — PROGRESS NOTES
1. Have you been to the ER, urgent care clinic or hospitalized since your last visit? NO.     2. Have you seen or consulted any other health care providers outside of the [address] [place] [address] since your last visit (Include any pap smears or colon screening)? NO      Do you have an Advanced Directive? NO    Would you like information on Advanced Directives?  NO

## 2017-11-20 ENCOUNTER — TELEPHONE (OUTPATIENT)
Dept: INTERNAL MEDICINE CLINIC | Age: 67
End: 2017-11-20

## 2018-01-09 ENCOUNTER — TELEPHONE (OUTPATIENT)
Dept: INTERNAL MEDICINE CLINIC | Age: 68
End: 2018-01-09

## 2018-01-09 DIAGNOSIS — N39.3 STRESS INCONTINENCE OF URINE: Primary | ICD-10-CM

## 2018-02-02 DIAGNOSIS — E78.5 HYPERLIPIDEMIA LDL GOAL <130: ICD-10-CM

## 2018-02-07 ENCOUNTER — OFFICE VISIT (OUTPATIENT)
Dept: ORTHOPEDIC SURGERY | Age: 68
End: 2018-02-07

## 2018-02-07 ENCOUNTER — HOSPITAL ENCOUNTER (OUTPATIENT)
Dept: LAB | Age: 68
Discharge: HOME | End: 2018-02-07
Payer: MEDICARE

## 2018-02-07 VITALS
DIASTOLIC BLOOD PRESSURE: 78 MMHG | SYSTOLIC BLOOD PRESSURE: 158 MMHG | TEMPERATURE: 97.8 F | HEART RATE: 67 BPM | HEIGHT: 67 IN | WEIGHT: 174 LBS | OXYGEN SATURATION: 98 % | BODY MASS INDEX: 27.31 KG/M2

## 2018-02-07 DIAGNOSIS — M79.641 RIGHT HAND PAIN: ICD-10-CM

## 2018-02-07 DIAGNOSIS — M54.2 NECK PAIN: ICD-10-CM

## 2018-02-07 DIAGNOSIS — M65.331 TRIGGER FINGER, RIGHT MIDDLE FINGER: Primary | ICD-10-CM

## 2018-02-07 DIAGNOSIS — M50.90 CERVICAL DISC DISEASE: ICD-10-CM

## 2018-02-07 LAB
ALBUMIN SERPL-MCNC: 3.8 G/DL (ref 3.4–5)
ALBUMIN/GLOB SERPL: 1.1 {RATIO} (ref 0.8–1.7)
ALP SERPL-CCNC: 82 U/L (ref 45–117)
ALT SERPL-CCNC: 15 U/L (ref 13–56)
ANION GAP SERPL CALC-SCNC: 5 MMOL/L (ref 3–18)
AST SERPL-CCNC: 15 U/L (ref 15–37)
BILIRUB SERPL-MCNC: 0.7 MG/DL (ref 0.2–1)
BUN SERPL-MCNC: 10 MG/DL (ref 7–18)
BUN/CREAT SERPL: 14 (ref 12–20)
CALCIUM SERPL-MCNC: 9.3 MG/DL (ref 8.5–10.1)
CHLORIDE SERPL-SCNC: 105 MMOL/L (ref 100–108)
CHOLEST SERPL-MCNC: 222 MG/DL
CO2 SERPL-SCNC: 32 MMOL/L (ref 21–32)
CREAT SERPL-MCNC: 0.71 MG/DL (ref 0.6–1.3)
GLOBULIN SER CALC-MCNC: 3.4 G/DL (ref 2–4)
GLUCOSE SERPL-MCNC: 89 MG/DL (ref 74–99)
HDLC SERPL-MCNC: 50 MG/DL (ref 40–60)
HDLC SERPL: 4.4 {RATIO} (ref 0–5)
LDLC SERPL CALC-MCNC: 146 MG/DL (ref 0–100)
LIPID PROFILE,FLP: ABNORMAL
POTASSIUM SERPL-SCNC: 3.8 MMOL/L (ref 3.5–5.5)
PROT SERPL-MCNC: 7.2 G/DL (ref 6.4–8.2)
SODIUM SERPL-SCNC: 142 MMOL/L (ref 136–145)
TRIGL SERPL-MCNC: 130 MG/DL (ref ?–150)
VLDLC SERPL CALC-MCNC: 26 MG/DL

## 2018-02-07 PROCEDURE — 80053 COMPREHEN METABOLIC PANEL: CPT | Performed by: INTERNAL MEDICINE

## 2018-02-07 PROCEDURE — 36415 COLL VENOUS BLD VENIPUNCTURE: CPT | Performed by: INTERNAL MEDICINE

## 2018-02-07 PROCEDURE — 80061 LIPID PANEL: CPT | Performed by: INTERNAL MEDICINE

## 2018-02-07 RX ORDER — TRIAMCINOLONE ACETONIDE 40 MG/ML
40 INJECTION, SUSPENSION INTRA-ARTICULAR; INTRAMUSCULAR ONCE
Qty: 1 ML | Refills: 0
Start: 2018-02-07 | End: 2018-02-07

## 2018-02-07 NOTE — PROGRESS NOTES
Bhakti Banuelos  1950   Chief Complaint   Patient presents with    Finger Pain     right middle        HISTORY OF PRESENT ILLNESS  Alana Strong is a 77 y.o. female who presents today for evaluation of right hand pain and neck pain. Patient rates pain as 5/10 today. Pain has been present for a couple of months. It hurts most at night and she isn't able to straighten out. Describes trouble with gripping, and locking. She has pain in the neck with certain movements. Reports radiating numbness and tingling. Worse on the left side. H/o of cervical fusion. Patient denies any fever, chills, chest pain, shortness of breath or calf pain. There are no new illness or injuries to report since last seen in the office. There are no changes to medications, allergies, family or social history. PHYSICAL EXAM:   Visit Vitals    /78    Pulse 67    Temp 97.8 °F (36.6 °C) (Oral)    Ht 5' 7\" (1.702 m)    Wt 174 lb (78.9 kg)    SpO2 98%    BMI 27.25 kg/m2     The patient is a well-developed, well-nourished female   in no acute distress. The patient is alert and oriented times three. The patient is alert and oriented times three. Mood and affect are normal.  LYMPHATIC: lymph nodes are not enlarged and are within normal limits  SKIN: normal in color and non tender to palpation. There are no bruises or abrasions noted. NEUROLOGICAL: Motor sensory exam is within normal limits. Reflexes are equal bilaterally.  There is normal sensation to pinprick and light touch  MUSCULOSKELETAL:  Examination Right Hand   Skin Intact   Deformity -   Swelling -   Tenderness -   Tenderness A1 Pulley + middle triggering   Finger flexion Full   Finger extension Full   Thenar Eminence Atrophy -   Sensation Normal   Capillary refill -   Heberden's nodes -   Dupuytren's -     Examination Right Wrist   Skin Intact   Tenderness -   Flexion 60   Extension 60   Deformity -   Effusion -   Tinnel's sign -   Phalen's test - Finklestein maneuver -   Pain with thumb abduction -     Examination Neck   Skin Intact   Tenderness, Paracervical +   Paracervical spasms  +   Flexion Decreased 25%   Extension Decreased 25%   Lateral bend left Normal   Lateral bend right Normal   Masses -   Spurling sign -   Biceps reflex Normal   Triceps reflex Normal   Brachioradialis reflex Normal   Sensation Normal       PROCEDURE: After sterile prep,  1 cc of Kenalog were injected into the right A1 pulley middle finger. [facility]  OFFICE PROCEDURE PROGRESS NOTE        Chart reviewed for the following:  Stefani Romero MD, have reviewed the History, Physical and updated the Allergic reactions for [address] performed immediately prior to start of procedure:  Stefani Romero MD, have performed the following reviews on [place] prior to the start of the procedure:            * Patient was identified by name and date of birth   * Agreement on procedure being performed was verified  * Risks and Benefits explained to the patient  * Procedure site verified and marked as necessary  * Patient was positioned for comfort  * Consent was signed and verified     Time: 11:24 AM    Date of procedure: 2/7/2018    Procedure performed by:  Jan Lagunas MD    Provider assisted by: (see medication administration)    How tolerated by patient: tolerated the procedure well with no complications    Comments: none      IMAGING: XR of the right hand dated 2/7/18 was reviewed and read: no acute abnormalities    XR of the cervical spine dated 2/7/18 was reviewed and read: loss of cervical lordosis, spondylitic changes C4-5, hardware from previous surgery noted      IMPRESSION:      ICD-10-CM ICD-9-CM    1.  Trigger finger, right middle finger M65.331 727.03 TRIAMCINOLONE ACETONIDE INJ      triamcinolone acetonide (KENALOG) 40 mg/mL injection      INJECT TENDON SHEATH/LIGAMENT   2. Neck pain M54.2 723.1 AMB POC XRAY, SPINE, CERVICAL; 2 OR 3   3. Right hand pain M79.641 729.5 AMB POC XRAY, HAND; 3+ VIEWS   4. Cervical disc disease M50.90 722.91 REFERRAL TO PHYSICAL THERAPY        PLAN:   1. Patient has right middle finger trigger finger. Her neck pain is coming from cervical disc disease. I am hopeful that she will benefit from therapy. Risk factors include: htn  2. Yes cortisone injection indicated today R A1 PULLEY MIDDLE  3. Yes Physical Therapy indicated today NECK  4. No diagnostic test indicated today  5. No durable medical equipment indicated today  6. No referral indicated today   7. No medications indicated today  8.  No Narcotic indicated today        RTC 4 weeks  Follow-up Disposition: Not on File    Scribed by Tatianna Mazariegos [address]) as dictated by EROS Bowser, [address] [facility] Specialist

## 2018-02-13 ENCOUNTER — ANESTHESIA (OUTPATIENT)
Dept: SURGERY | Age: 68
End: 2018-02-13
Payer: MEDICARE

## 2018-02-13 ENCOUNTER — HOSPITAL ENCOUNTER (OUTPATIENT)
Age: 68
Setting detail: OUTPATIENT SURGERY
Discharge: HOME | End: 2018-02-13
Attending: UROLOGY | Admitting: UROLOGY
Payer: MEDICARE

## 2018-02-13 ENCOUNTER — ANESTHESIA EVENT (OUTPATIENT)
Dept: SURGERY | Age: 68
End: 2018-02-13
Payer: MEDICARE

## 2018-02-13 VITALS
HEART RATE: 52 BPM | OXYGEN SATURATION: 99 % | RESPIRATION RATE: 15 BRPM | DIASTOLIC BLOOD PRESSURE: 78 MMHG | WEIGHT: 174 LBS | BODY MASS INDEX: 27.31 KG/M2 | SYSTOLIC BLOOD PRESSURE: 176 MMHG | TEMPERATURE: 97.2 F | HEIGHT: 67 IN

## 2018-02-13 PROCEDURE — 77030012961 HC IRR KT CYSTO/TUR ICUM -A: Performed by: UROLOGY

## 2018-02-13 PROCEDURE — 77030018836 HC SOL IRR NACL ICUM -A: Performed by: UROLOGY

## 2018-02-13 PROCEDURE — 77030020782 HC GWN BAIR PAWS FLX 3M -B: Performed by: UROLOGY

## 2018-02-13 PROCEDURE — 77030010509 HC AIRWY LMA MSK TELE -A: Performed by: NURSE ANESTHETIST, CERTIFIED REGISTERED

## 2018-02-13 PROCEDURE — 74011250636 HC RX REV CODE- 250/636: Performed by: UROLOGY

## 2018-02-13 PROCEDURE — 77030032490 HC SLV COMPR SCD KNE COVD -B: Performed by: UROLOGY

## 2018-02-13 PROCEDURE — 77030036955 HC NDL INJ BLDR WALL RIF CYSTOSCP COLO -B: Performed by: UROLOGY

## 2018-02-13 PROCEDURE — 76210000020 HC REC RM PH II FIRST 0.5 HR: Performed by: UROLOGY

## 2018-02-13 PROCEDURE — 76060000031 HC ANESTHESIA FIRST 0.5 HR: Performed by: UROLOGY

## 2018-02-13 PROCEDURE — 74011250636 HC RX REV CODE- 250/636: Performed by: ANESTHESIOLOGY

## 2018-02-13 PROCEDURE — 74011000250 HC RX REV CODE- 250

## 2018-02-13 PROCEDURE — 76210000006 HC OR PH I REC 0.5 TO 1 HR: Performed by: UROLOGY

## 2018-02-13 PROCEDURE — 74011000250 HC RX REV CODE- 250: Performed by: ANESTHESIOLOGY

## 2018-02-13 PROCEDURE — 76010000154 HC OR TIME FIRST 0.5 HR: Performed by: UROLOGY

## 2018-02-13 PROCEDURE — 74011250636 HC RX REV CODE- 250/636

## 2018-02-13 RX ORDER — LIDOCAINE HYDROCHLORIDE 20 MG/ML
INJECTION, SOLUTION EPIDURAL; INFILTRATION; INTRACAUDAL; PERINEURAL AS NEEDED
Status: DISCONTINUED | OUTPATIENT
Start: 2018-02-13 | End: 2018-02-13 | Stop reason: HOSPADM

## 2018-02-13 RX ORDER — SODIUM CHLORIDE, SODIUM LACTATE, POTASSIUM CHLORIDE, CALCIUM CHLORIDE 600; 310; 30; 20 MG/100ML; MG/100ML; MG/100ML; MG/100ML
75 INJECTION, SOLUTION INTRAVENOUS CONTINUOUS
Status: DISCONTINUED | OUTPATIENT
Start: 2018-02-13 | End: 2018-02-13 | Stop reason: HOSPADM

## 2018-02-13 RX ORDER — PROPOFOL 10 MG/ML
INJECTION, EMULSION INTRAVENOUS AS NEEDED
Status: DISCONTINUED | OUTPATIENT
Start: 2018-02-13 | End: 2018-02-13 | Stop reason: HOSPADM

## 2018-02-13 RX ORDER — HYDROCODONE BITARTRATE AND ACETAMINOPHEN 5; 325 MG/1; MG/1
1 TABLET ORAL
Qty: 12 TAB | Refills: 0 | Status: SHIPPED | OUTPATIENT
Start: 2018-02-13 | End: 2018-02-21 | Stop reason: SDUPTHER

## 2018-02-13 RX ORDER — SODIUM CHLORIDE 0.9 % (FLUSH) 0.9 %
5-10 SYRINGE (ML) INJECTION EVERY 8 HOURS
Status: DISCONTINUED | OUTPATIENT
Start: 2018-02-13 | End: 2018-02-13 | Stop reason: HOSPADM

## 2018-02-13 RX ORDER — CEFAZOLIN SODIUM 2 G/50ML
2 SOLUTION INTRAVENOUS
Status: COMPLETED | OUTPATIENT
Start: 2018-02-13 | End: 2018-02-13

## 2018-02-13 RX ORDER — INSULIN LISPRO 100 [IU]/ML
INJECTION, SOLUTION INTRAVENOUS; SUBCUTANEOUS ONCE
Status: DISCONTINUED | OUTPATIENT
Start: 2018-02-13 | End: 2018-02-13 | Stop reason: HOSPADM

## 2018-02-13 RX ORDER — SODIUM CHLORIDE 0.9 % (FLUSH) 0.9 %
5-10 SYRINGE (ML) INJECTION AS NEEDED
Status: DISCONTINUED | OUTPATIENT
Start: 2018-02-13 | End: 2018-02-13 | Stop reason: HOSPADM

## 2018-02-13 RX ORDER — LEVOFLOXACIN 500 MG/1
500 TABLET, FILM COATED ORAL DAILY
Qty: 5 TAB | Refills: 0 | Status: SHIPPED | OUTPATIENT
Start: 2018-02-13 | End: 2018-02-26

## 2018-02-13 RX ADMIN — LIDOCAINE HYDROCHLORIDE 60 MG: 20 INJECTION, SOLUTION EPIDURAL; INFILTRATION; INTRACAUDAL; PERINEURAL at 14:48

## 2018-02-13 RX ADMIN — PROPOFOL 200 MG: 10 INJECTION, EMULSION INTRAVENOUS at 14:48

## 2018-02-13 RX ADMIN — FAMOTIDINE 20 MG: 10 INJECTION, SOLUTION INTRAVENOUS at 14:07

## 2018-02-13 RX ADMIN — CEFAZOLIN SODIUM 2 G: 2 SOLUTION INTRAVENOUS at 14:42

## 2018-02-13 RX ADMIN — SODIUM CHLORIDE, SODIUM LACTATE, POTASSIUM CHLORIDE, AND CALCIUM CHLORIDE 75 ML/HR: 600; 310; 30; 20 INJECTION, SOLUTION INTRAVENOUS at 14:03

## 2018-02-13 NOTE — DISCHARGE INSTRUCTIONS
Cystoscopy: What to Expect at [facility]    A cystoscopy is a procedure that lets a doctor look inside of the bladder and the urethra. The urethra is the tube that carries urine from the bladder to outside the body. The doctor uses a thin, lighted tool called a cystoscope. Your bladder is filled with fluid. This stretches the bladder so that your doctor can look closely at the inside of your bladder. After the cystoscopy, your urethra may be sore at first, and it may burn when you urinate for the first few days after the procedure. You may feel the need to urinate more often, and your urine may be pink. These symptoms should get better in 1 or 2 days. You will probably be able to go back to most of your usual activities in 1 or 2 days. This care sheet gives you a general idea about how long it will take for you to recover. But each person recovers at a different pace. Follow the steps below to get better as quickly as possible. How can you care for yourself at home? Activity  ? · Rest when you feel tired. Getting enough sleep will help you recover. ? · Try to walk each day. Start by walking a little more than you did the day before. Bit by bit, increase the amount you walk. Walking boosts blood flow and helps prevent pneumonia and constipation. ? · Avoid strenuous activities, such as bicycle riding, jogging, weight lifting, or aerobic exercise, until your doctor says it is okay. ? · Ask your doctor when you can drive again. ? · Most people are able to return to work within 1 or 2 days after the procedure. ? · You may shower and take baths as usual.   ? · Ask your doctor when it is okay for you to have sex. Diet  ? · You can eat your normal diet. If your stomach is upset, try bland, low-fat foods like plain rice, broiled chicken, toast, and yogurt. ? · Drink plenty of fluids (unless your doctor tells you not to). Medicines  ? · Take pain medicines exactly as directed.   ¨ If the doctor gave you a prescription medicine for pain, take it as prescribed. ¨ If you are not taking a prescription pain medicine, ask your doctor if you can take an over-the-counter medicine. ? · If you think your pain medicine is making you sick to your stomach:  ¨ Take your medicine after meals (unless your doctor has told you not to). ¨ Ask your doctor for a different pain medicine. ? · If your doctor prescribed antibiotics, take them as directed. Do not stop taking them just because you feel better. You need to take the full course of antibiotics. Follow-up care is a key part of your treatment and safety. Be sure to make and go to all appointments, and call your doctor if you are having problems. It's also a good idea to know your test results and keep a list of the medicines you take. When should you call for help? Call 911 anytime you think you may need emergency care. For example, call if:  ? · You passed out (lost consciousness). ? · You have severe trouble breathing. ? · You have sudden chest pain and shortness of breath, or you cough up blood. ? · You have severe belly pain. ?Call your doctor now or seek immediate medical care if:  ? · You are sick to your stomach or cannot keep fluids down. ? · Your urine is still red or you see blood clots after you have urinated several times. ? · You have trouble passing urine or stool, especially if you have pain or swelling in your lower belly. ? · You have signs of a blood clot, such as:  ¨ Pain in your calf, back of the knee, thigh, or groin. ¨ Redness and swelling in your leg or groin. ? · You develop a fever or severe chills. ? · You have pain in your back just below your rib cage. This is called flank pain. ? Watch closely for changes in your health, and be sure to contact your doctor if:  ? · You have pain or burning when you urinate. A burning feeling is normal for a day or two after the test, but call if it does not get better.    ? · You have a frequent urge to urinate but can pass only small amounts of urine. ? · Your urine is pink, red, or cloudy, or smells bad. It is normal for the urine to have a pinkish color for a few days after the test, but call if it does not get better. Where can you learn more? Go to http://dayna-mary jane.info/. Enter S486 in the search box to learn more about \"Cystoscopy: What to Expect at Home. \"  Current as of: May 12, 2017  Content Version: 11.4  © 9817-8198 [employer]. Care instructions adapted under license by [employer] (which disclaims liability or warranty for this information). If you have questions about a medical condition or this instruction, always ask your healthcare professional. [address] any warranty or liability for your use of this information. OnabotulinumtoxinA (Botox, Botox Cosmetic) - (By injection)   Why this medicine is used:   Treats muscle stiffness and spasms, excessive sweating, overactive bladder, or loss of bladder control. Prevents chronic migraine headaches. Improves the appearance of wrinkles on the face. Contact a nurse or doctor right away if you have:  · Slow or uneven heartbeat, chest pain, trouble breathing, swallowing, or talking  · Change in how much or how often you urinate, trouble or painful urination  · Headache, increased sweating, warmth or redness in your face, neck, or arm  · Blurred or double vision, droopy eyelids     Common side effects:  · Fever, chills, cough, stuffy or runny nose, sore throat, and body aches  · Pain in your neck, back, arms, or legs, muscle weakness  © 2017 [address] is for End User's use only and may not be sold, redistributed or otherwise used for commercial purposes. Narcotic-Analgesic/Acetaminophen (Percocet, Norco, Lorcet HD, Lortab 10/325) - (By mouth)   Why this medicine is used:   Relieves pain.   Contact a nurse or doctor right away if you have:  · Extreme weakness, shallow breathing, slow heartbeat  · Severe confusion, lightheadedness, dizziness, fainting  · Yellow skin or eyes, dark urine or pale stools  · Severe constipation, severe stomach pain, nausea, vomiting, loss of appetite  · Sweating or cold, clammy skin     Common side effects:  · Mild constipation, nausea, vomiting  · Sleepiness, tiredness  · Itching, rash  © 2017 [facility] Information is for End User's use only and may not be sold, redistributed or otherwise used for commercial purposes. Levofloxacin (By mouth)   Levofloxacin (yar-gqp-MJXJ-a-sin)  Treats infections. This medicine is a quinolone antibiotic. Brand Name(s): Levaquin   There may be other brand names for this medicine. When This Medicine Should Not Be Used: This medicine is not right for everyone. Do not use it if you had an allergic reaction to levofloxacin or to similar medicines. How to Use This Medicine:   Liquid, Tablet  · Your doctor will tell you how much medicine to use. Do not use more than directed. Take your medicine at the same time each day. · Tablet: Take it with or without food. · Liquid: Take it 1 hour before or 2 hours after you eat. Measure the oral liquid medicine with a marked measuring spoon, oral syringe, or medicine cup. · Take all of the medicine in your prescription to clear up your infection, even if you feel better after the first few doses. · Drink extra fluids so you will urinate more often and help prevent kidney problems. · This medicine should come with a Medication Guide. Ask your pharmacist for a copy if you do not have one. · Missed dose: Take a dose as soon as you remember. If it is almost time for your next dose, wait until then and take a regular dose. Do not take extra medicine to make up for a missed dose. · Store the medicine in a closed container at room temperature, away from heat, moisture, and direct light.   Drugs and Foods to Avoid:   Ask your doctor or pharmacist before using any other medicine, including over-the-counter medicines, vitamins, and herbal products. · Some foods and medicines can affect how levofloxacin works. Tell your doctor if you are using any of the following:  ¨ Theophylline  ¨ Blood thinner (including warfarin)  ¨ Diabetes medicine  ¨ Medicine for heart rhythm problems (including amiodarone, procainamide, quinidine, sotalol)  ¨ NSAID pain or arthritis medicine (including aspirin, celecoxib, diclofenac, ibuprofen, naproxen)  ¨ Steroid medicine (including hydrocortisone, methylprednisolone, prednisone)  · Take levofloxacin at least 2 hours before or 2 hours after you take antacids that contain magnesium or aluminum, zinc, or iron supplements, sucralfate, or didanosine. Warnings While Using This Medicine:   · Tell your doctor if you are pregnant or breastfeeding, or if you have kidney disease, liver disease, diabetes, heart disease, myasthenia gravis, or a history of heart rhythm problems (such as QT prolongation) or seizures. Tell your doctor if you have ever had tendon or joint problems, including rheumatoid arthritis, or if you have received a transplant. · This medicine may cause the following problems:  ¨ Tendinitis and tendon rupture (may happen after treatment ends)  ¨ Liver damage  ¨ Nerve damage in the arms or legs  ¨ Heart rhythm changes  ¨ Changes in blood sugar levels  · This medicine may make you feel dizzy or lightheaded. Do not drive or do anything else that could be dangerous until you know how this medicine affects you. · This medicine can cause diarrhea. Call your doctor if the diarrhea becomes severe, does not stop, or is bloody. Do not take any medicine to stop diarrhea until you have talked to your doctor. Diarrhea can occur 2 months or more after you stop taking this medicine. · Tell any doctor or dentist who treats you that you are using this medicine.  This medicine may affect certain medical test results. · This medicine may make your skin more sensitive to sunlight. Wear sunscreen. Do not use sunlamps or tanning beds. · Call your doctor if your symptoms do not improve or if they get worse. · Keep all medicine out of the reach of children. Never share your medicine with anyone. Possible Side Effects While Using This Medicine:   Call your doctor right away if you notice any of these side effects:  · Allergic reaction: Itching or hives, swelling in your face or hands, swelling or tingling in your mouth or throat, chest tightness, trouble breathing  · Blistering, peeling, red skin rash  · Change in how much or how often you urinate  · Dark urine or pale stools, nausea, vomiting, loss of appetite, stomach pain, yellow skin or eyes  · Diarrhea that may contain blood  · Fainting, dizziness, or lightheadedness  · Fast, slow, or uneven heartbeat, chest pain  · Numbness, tingling, or burning pain in your hands, arms, legs, or feet  · Pain, stiffness, swelling, or bruises around your ankle, leg, shoulder, or other joint  · Seizures, severe headache, unusual thoughts or behaviors, trouble sleeping, feeling anxious, confused, or depressed, seeing, hearing, or feeling things that are not there  · Unusual bleeding, bruising, or weakness  If you notice these less serious side effects, talk with your doctor:   · Mild headache or nausea  If you notice other side effects that you think are caused by this medicine, tell your doctor. Call your doctor for medical advice about side effects. You may report side effects to FDA at 7-999-FDA-1566  © 2017 [address]  Information is for End User's use only and may not be sold, redistributed or otherwise used for commercial purposes. The above information is an  only. It is not intended as medical advice for individual conditions or treatments.  Talk to your doctor, nurse or pharmacist before following any medical regimen to see if it is safe and effective for you. DISCHARGE SUMMARY from Nurse    PATIENT INSTRUCTIONS:    After general anesthesia or intravenous sedation, for 24 hours or while taking prescription Narcotics:  · Limit your activities  · Do not drive and operate hazardous machinery  · Do not make important personal or business decisions  · Do  not drink alcoholic beverages  · If you have not urinated within 8 hours after discharge, please contact your surgeon on call. Report the following to your surgeon:  · Excessive pain, swelling, redness or odor of or around the surgical area  · Temperature over 100.5  · Nausea and vomiting lasting longer than 4 hours or if unable to take medications  · Any signs of decreased circulation or nerve impairment to extremity: change in color, persistent  numbness, tingling, coldness or increase pain  · Any questions  *  Please give a list of your current medications to your Primary Care Provider. *  Please update this list whenever your medications are discontinued, doses are      changed, or new medications (including over-the-counter products) are added. *  Please carry medication information at all times in case of emergency situations. These are general instructions for a healthy lifestyle:    No smoking/ No tobacco products/ Avoid exposure to second hand smoke  Surgeon General's Warning:  Quitting smoking now greatly reduces serious risk to your health. Obesity, smoking, and sedentary lifestyle greatly increases your risk for illness    A healthy diet, regular physical exercise & weight monitoring are important for maintaining a healthy lifestyle    You may be retaining fluid if you have a history of heart failure or if you experience any of the following symptoms:  Weight gain of 3 pounds or more overnight or 5 pounds in a week, increased swelling in our hands or feet or shortness of breath while lying flat in bed.   Please call your doctor as soon as you notice any of these symptoms; do not wait until your next office visit. Recognize signs and symptoms of STROKE:    F-face looks uneven    A-arms unable to move or move unevenly    S-speech slurred or non-existent    T-time-call 911 as soon as signs and symptoms begin-DO NOT go       Back to bed or wait to see if you get better-TIME IS BRAIN. Warning Signs of HEART ATTACK     Call 911 if you have these symptoms:   Chest discomfort. Most heart attacks involve discomfort in the center of the chest that lasts more than a few minutes, or that goes away and comes back. It can feel like uncomfortable pressure, squeezing, fullness, or pain.  Discomfort in other areas of the upper body. Symptoms can include pain or discomfort in one or both arms, the back, neck, jaw, or stomach.  Shortness of breath with or without chest discomfort.  Other signs may include breaking out in a cold sweat, nausea, or lightheadedness. Don't wait more than five minutes to call 911 - MINUTES MATTER! Fast action can save your life. Calling 911 is almost always the fastest way to get lifesaving treatment. Emergency Medical Services staff can begin treatment when they arrive -- up to an hour sooner than if someone gets to the hospital by car. The discharge information has been reviewed with the patient and mother. The patient and mother verbalized understanding. Discharge medications reviewed with the patient and mother and appropriate educational materials and side effects teaching were provided.   ___________________________________________________________________________________________________________________________________

## 2018-02-13 NOTE — OP NOTES
[facility]     Name: Kim Garcia  MR#: [id number]  : 1950    Date of Surgery: 2018        SURGEON: Masha Horowitz MD.     PREOPERATIVE DIAGNOSES:   1. Overactive bladder, detrusor overactivity  2. Urinary incontinence  3. OAB refractory to medications, failed 100units botox     POSTOPERATIVE DIAGNOSES:   1. Same as above       PROCEDURES PERFORMED:  1. Cystoscopy. 2. Intravesical Botox injection, 300 units.     ESTIMATED BLOOD LOSS: 5ml     COMPLICATIONS: None.     DRAINS: None.     SPECIMENS REMOVED: None.     ANESTHESIA: LMA.     ANTIBIOTICS: Ancef.     FINDINGS: The ureteral orifices were in normal position. The urethra appeared normal.      INDICATIONS: This is a 77 y. o.female who has OAB and is refractory   To OAB meds. He elects for intravesical botox therapy after risks of urinary   Retention, UTI, incomplete bladder emptying were discussed and demonstrates  Understanding      DESCRIPTION OF PROCEDURE: After consent was obtained, the patient  was brought to the operating room and placed in supine position. Anesthesia was administered using MAC. Antibiotics were given. The  Patient was placed in dorsal lithotomy position and was properly padded and  the back was supported. The patient was prepped and draped in normal  sterile fashion. Using a 21-Faroese sheath and a 30 degree lens scope  to perform a cystourethroscopy, the urethra appeared normal. The  bladder appeared normal. The ureteral orifices were normal  position. I then introduced the intravesical needle through the  cystoscope. After reconstituting 300 units of Botox in 20 mL of normal  saline. 0.5ml aliquots was given intravesically into the bladder wall  in several spots away from the trigone, we started from the posterior  wall and to the lateral and anterior walls. There was no  bleeding noted at end of the case, the bladder was emptied.  The  patient was then awakened from anesthesia and transferred to PACU  in stable condition.           Avi Crespo MD

## 2018-02-13 NOTE — PERIOP NOTES
Patient armband removed and shredded  Patient confirmed by two identifiers with discharge instructions prior to being provided to patient and mother.

## 2018-02-13 NOTE — ANESTHESIA POSTPROCEDURE EVALUATION
Post-Anesthesia Evaluation and Assessment    Patient: Laura Hernandez MRN: [id number]  SSN: [id number]    YOB: 1950  Age: 77 y.o. Sex: female       Cardiovascular Function/Vital Signs  Visit Vitals    /84    Pulse (!) 59    Temp 36.7 °C (98.1 °F)    Resp 14    Ht 5' 7\" (1.702 m)    Wt 78.9 kg (174 lb)    SpO2 99%    BMI 27.25 kg/m2       Patient is status post general anesthesia for Procedure(s):  CYSTOSCOPY/BOTOX 100U/LABORIE NEEDLE. Nausea/Vomiting: None    Postoperative hydration reviewed and adequate. Pain:  Pain Scale 1: Numeric (0 - 10) (02/13/18 1535)  Pain Intensity 1: 0 (02/13/18 1535)   Managed    Neurological Status:   Neuro (WDL): Within Defined Limits (02/13/18 1512)   At baseline    Mental Status and Level of Consciousness: Arousable    Pulmonary Status:   O2 Device: Room air (02/13/18 1515)   Adequate oxygenation and airway patent    Complications related to anesthesia: None    Post-anesthesia assessment completed.  No concerns    Signed By: Whitley Miller MD     February 13, 2018

## 2018-02-13 NOTE — INTERVAL H&P NOTE
H&P Update:  Ashish Field was seen and examined. History and physical has been reviewed. The patient has been examined. There have been no significant clinical changes since the completion of the originally dated History and Physical.  Plan for Intravesical Botox 100 units under MAC anesthesia for Refractory OAB. Periop Abx - Ancef.       Signed By: Prabhu Ledezma MD     February 13, 2018 2:12 PM

## 2018-02-13 NOTE — ANESTHESIA PREPROCEDURE EVALUATION
Anesthetic History   No history of anesthetic complications            Review of Systems / Medical History  Patient summary reviewed and pertinent labs reviewed    Pulmonary  Within defined limits      Sleep apnea: CPAP           Neuro/Psych         Psychiatric history     Cardiovascular    Hypertension              Exercise tolerance: >4 METS     GI/Hepatic/Renal                Endo/Other      Hypothyroidism: poorly controlled  Arthritis     Other Findings   Comments: Documentation of current medication  Current medications obtained, documented and obtained? YES      Risk Factors for Postoperative nausea/vomiting:       History of postoperative nausea/vomiting? NO       Female? YES       Motion sickness? NO       Intended opioid administration for postoperative analgesia? YES      Smoking Abstinence:  Current Smoker? NO  Elective Surgery? YES  Seen preoperatively by anesthesiologist or proxy prior to day of surgery? YES  Pt abstained from smoking 24 hours prior to anesthesia?  YES    Preventive care/screening for High Blood Pressure:  Aged 18 years and older: YES  Screened for high blood pressure: YES  Patients with high blood pressure referred to primary care provider   for BP management: YES                     Physical Exam    Airway  Mallampati: II  TM Distance: 4 - 6 cm  Neck ROM: normal range of motion   Mouth opening: Normal     Cardiovascular    Rhythm: regular  Rate: normal         Dental  No notable dental hx       Pulmonary  Breath sounds clear to auscultation               Abdominal  GI exam deferred       Other Findings            Anesthetic Plan    ASA: 3  Anesthesia type: general          Induction: Intravenous  Anesthetic plan and risks discussed with: Patient

## 2018-02-13 NOTE — IP AVS SNAPSHOT
[address] [address] 
[phone] Patient: Shelby Mathur MRN: [id number] DFR:[id number] About your hospitalization You were admitted on:  February 13, 2018 You last received care in the:  [facility] PAC[facility] You were discharged on:  February 13, 2018 Why you were hospitalized Your primary diagnosis was:  Not on File Follow-up Information Follow up With Details Comments Contact Info Miguel Ibrahim MD   [address] [facility] [address] [facility] 
[phone] Caren Gage MD  Follow up in 2 weeks. NO BLOOD THINNERS OR NAPROXEN FOR AT LEAST ONE WEEK [address] [facility] 
[address] 
[phone] Your Scheduled Appointments Wednesday February 21, 2018  2:30 PM EST Office Visit with Miguel Ibrahim MD  
[facility] [address]  
[phone] Discharge Orders None A check melissa indicates which time of day the medication should be taken. My Medications START taking these medications Instructions Each Dose to Equal  
 Morning Noon Evening Bedtime  
 levoFLOXacin 500 mg tablet Commonly known as:  Mickey Juan Manuel Your last dose was: Your next dose is: Take 1 Tab by mouth daily. 500 mg CHANGE how you take these medications Instructions Each Dose to Equal  
 Morning Noon Evening Bedtime * HYDROcodone-acetaminophen  mg tablet Commonly known as:  [place] What changed:  Another medication with the same name was added. Make sure you understand how and when to take each. Your last dose was: Your next dose is: Take 1 Tab by mouth every six (6) hours as needed for Pain. Max Daily Amount: 4 Tabs. 1 Tab * HYDROcodone-acetaminophen 5-325 mg per tablet Commonly known as:  Mortimer Newness What changed: You were already taking a medication with the same name, and this prescription was added. Make sure you understand how and when to take each. Your last dose was: Your next dose is: Take 1 Tab by mouth every four (4) hours as needed for Pain. Max Daily Amount: 6 Tabs. 1 Tab * Notice: This list has 2 medication(s) that are the same as other medications prescribed for you. Read the directions carefully, and ask your doctor or other care provider to review them with you. CONTINUE taking these medications Instructions Each Dose to Equal  
 Morning Noon Evening Bedtime  
 amLODIPine 5 mg tablet Commonly known as:  Dali Mcguire Your last dose was: Your next dose is: TAKE 1 TABLET BY MOUTH DAILY  
     
   
   
   
  
 benzonatate 200 mg capsule Commonly known as:  TESSALON Your last dose was: Your next dose is: Take 1 Cap by mouth three (3) times daily as needed for Cough. 200 mg  
    
   
   
   
  
 * levothyroxine 112 mcg tablet Commonly known as:  SYNTHROID Your last dose was: Your next dose is: TAKE 1 TABLET DAILY BEFORE BREAKFAST * levothyroxine 112 mcg tablet Commonly known as:  SYNTHROID Your last dose was: Your next dose is: TAKE 1 TABLET DAILY BEFORE BREAKFAST  
     
   
   
   
  
 pravastatin 40 mg tablet Commonly known as:  PRAVACHOL Your last dose was: Your next dose is: TAKE 1 TABLET NIGHTLY  
     
   
   
   
  
 sertraline 100 mg tablet Commonly known as:  ZOLOFT Your last dose was: Your next dose is: TAKE 1 TABLET EVERY DAY  
     
   
   
   
  
 tolterodine ER 4 mg ER capsule Commonly known as:  Jeet Robbins Your last dose was: Your next dose is: Take 1 Cap by mouth daily. 4 mg VITAMIN C 500 mg tablet Generic drug:  ascorbic acid (vitamin C) Your last dose was: Your next dose is: Take  by mouth. * Cholecalciferol (Vitamin D3) 2,000 unit Cap capsule Commonly known as:  VITAMIN D3 Your last dose was: Your next dose is: Take  by Mouth Once a Day. * VITAMIN D3 1,000 unit tablet Generic drug:  cholecalciferol Your last dose was: Your next dose is: Take 2,000 Units by mouth daily. 2000 Units  
    
   
   
   
  
 vitamin e 1,000 unit capsule Commonly known as:  E GEMS Your last dose was: Your next dose is: Take 1,000 Units by mouth daily. 1000 Units ZyrTEC 10 mg tablet Generic drug:  cetirizine Your last dose was: Your next dose is: Take  by mouth daily. * Notice: This list has 4 medication(s) that are the same as other medications prescribed for you. Read the directions carefully, and ask your doctor or other care provider to review them with you. STOP taking these medications ALEVE 220 mg tablet Generic drug:  naproxen sodium Where to Get Your Medications Information on where to get these meds will be given to you by the nurse or doctor. ! Ask your nurse or doctor about these medications HYDROcodone-acetaminophen 5-325 mg per tablet  
 levoFLOXacin 500 mg tablet Discharge Instructions Cystoscopy: What to Expect at [facility] Your Recovery A cystoscopy is a procedure that lets a doctor look inside of the bladder and the urethra. The urethra is the tube that carries urine from the bladder to outside the body.  The doctor uses a thin, lighted tool called a cystoscope. Your bladder is filled with fluid. This stretches the bladder so that your doctor can look closely at the inside of your bladder. After the cystoscopy, your urethra may be sore at first, and it may burn when you urinate for the first few days after the procedure. You may feel the need to urinate more often, and your urine may be pink. These symptoms should get better in 1 or 2 days. You will probably be able to go back to most of your usual activities in 1 or 2 days. This care sheet gives you a general idea about how long it will take for you to recover. But each person recovers at a different pace. Follow the steps below to get better as quickly as possible. How can you care for yourself at home? Activity ? · Rest when you feel tired. Getting enough sleep will help you recover. ? · Try to walk each day. Start by walking a little more than you did the day before. Bit by bit, increase the amount you walk. Walking boosts blood flow and helps prevent pneumonia and constipation. ? · Avoid strenuous activities, such as bicycle riding, jogging, weight lifting, or aerobic exercise, until your doctor says it is okay. ? · Ask your doctor when you can drive again. ? · Most people are able to return to work within 1 or 2 days after the procedure. ? · You may shower and take baths as usual.  
? · Ask your doctor when it is okay for you to have sex. Diet ? · You can eat your normal diet. If your stomach is upset, try bland, low-fat foods like plain rice, broiled chicken, toast, and yogurt. ? · Drink plenty of fluids (unless your doctor tells you not to). Medicines ? · Take pain medicines exactly as directed. ¨ If the doctor gave you a prescription medicine for pain, take it as prescribed. ¨ If you are not taking a prescription pain medicine, ask your doctor if you can take an over-the-counter medicine.   
? · If you think your pain medicine is making you sick to your stomach: 
 ¨ Take your medicine after meals (unless your doctor has told you not to). ¨ Ask your doctor for a different pain medicine. ? · If your doctor prescribed antibiotics, take them as directed. Do not stop taking them just because you feel better. You need to take the full course of antibiotics. Follow-up care is a key part of your treatment and safety. Be sure to make and go to all appointments, and call your doctor if you are having problems. It's also a good idea to know your test results and keep a list of the medicines you take. When should you call for help? Call 911 anytime you think you may need emergency care. For example, call if: 
? · You passed out (lost consciousness). ? · You have severe trouble breathing. ? · You have sudden chest pain and shortness of breath, or you cough up blood. ? · You have severe belly pain. ?Call your doctor now or seek immediate medical care if: 
? · You are sick to your stomach or cannot keep fluids down. ? · Your urine is still red or you see blood clots after you have urinated several times. ? · You have trouble passing urine or stool, especially if you have pain or swelling in your lower belly. ? · You have signs of a blood clot, such as: 
¨ Pain in your calf, back of the knee, thigh, or groin. ¨ Redness and swelling in your leg or groin. ? · You develop a fever or severe chills. ? · You have pain in your back just below your rib cage. This is called flank pain. ? Watch closely for changes in your health, and be sure to contact your doctor if: 
? · You have pain or burning when you urinate. A burning feeling is normal for a day or two after the test, but call if it does not get better. ? · You have a frequent urge to urinate but can pass only small amounts of urine. ? · Your urine is pink, red, or cloudy, or smells bad. It is normal for the urine to have a pinkish color for a few days after the test, but call if it does not get better. Where can you learn more? Go to http://dayna-mary jane.info/. Enter Z777 in the search box to learn more about \"Cystoscopy: What to Expect at Home. \" Current as of: May 12, 2017 Content Version: 11.4 © 6328-1879 [employer]. Care instructions adapted under license by [employer] (which disclaims liability or warranty for this information). If you have questions about a medical condition or this instruction, always ask your healthcare professional. [place] [address] any warranty or liability for your use of this information. OnabotulinumtoxinA (Botox, Botox Cosmetic) - (By injection) Why this medicine is used:  
Treats muscle stiffness and spasms, excessive sweating, overactive bladder, or loss of bladder control. Prevents chronic migraine headaches. Improves the appearance of wrinkles on the face. Contact a nurse or doctor right away if you have: · Slow or uneven heartbeat, chest pain, trouble breathing, swallowing, or talking · Change in how much or how often you urinate, trouble or painful urination · Headache, increased sweating, warmth or redness in your face, neck, or arm · Blurred or double vision, droopy eyelids Common side effects: · Fever, chills, cough, stuffy or runny nose, sore throat, and body aches · Pain in your neck, back, arms, or legs, muscle weakness © 2017 [facility] Information is for End User's use only and may not be sold, redistributed or otherwise used for commercial purposes. Narcotic-Analgesic/Acetaminophen (Percocet, Norco, Lorcet HD, Lortab 10/325) - (By mouth) Why this medicine is used:  
Relieves pain. Contact a nurse or doctor right away if you have: 
· Extreme weakness, shallow breathing, slow heartbeat · Severe confusion, lightheadedness, dizziness, fainting · Yellow skin or eyes, dark urine or pale stools · Severe constipation, severe stomach pain, nausea, vomiting, loss of appetite · Sweating or cold, clammy skin Common side effects: · Mild constipation, nausea, vomiting · Sleepiness, tiredness · Itching, rash © 2017 [address]  Information is for End User's use only and may not be sold, redistributed or otherwise used for commercial purposes. Levofloxacin (By mouth) Levofloxacin ([id number]-a-[id number]) Treats infections. This medicine is a quinolone antibiotic. Brand Name(s): Levaquin There may be other brand names for this medicine. When This Medicine Should Not Be Used: This medicine is not right for everyone. Do not use it if you had an allergic reaction to levofloxacin or to similar medicines. How to Use This Medicine:  
Liquid, Tablet · Your doctor will tell you how much medicine to use. Do not use more than directed. Take your medicine at the same time each day. · Tablet: Take it with or without food. · Liquid: Take it 1 hour before or 2 hours after you eat. Measure the oral liquid medicine with a marked measuring spoon, oral syringe, or medicine cup. · Take all of the medicine in your prescription to clear up your infection, even if you feel better after the first few doses. · Drink extra fluids so you will urinate more often and help prevent kidney problems. · This medicine should come with a Medication Guide. Ask your pharmacist for a copy if you do not have one. · Missed dose: Take a dose as soon as you remember. If it is almost time for your next dose, wait until then and take a regular dose. Do not take extra medicine to make up for a missed dose. · Store the medicine in a closed container at room temperature, away from heat, moisture, and direct light. Drugs and Foods to Avoid: Ask your doctor or pharmacist before using any other medicine, including over-the-counter medicines, vitamins, and herbal products. · Some foods and medicines can affect how levofloxacin works. Tell your doctor if you are using any of the following: ¨ Theophylline ¨ Blood thinner (including warfarin) ¨ Diabetes medicine ¨ Medicine for heart rhythm problems (including amiodarone, procainamide, quinidine, sotalol) ¨ NSAID pain or arthritis medicine (including aspirin, celecoxib, diclofenac, ibuprofen, naproxen) ¨ Steroid medicine (including hydrocortisone, methylprednisolone, prednisone) · Take levofloxacin at least 2 hours before or 2 hours after you take antacids that contain magnesium or aluminum, zinc, or iron supplements, sucralfate, or didanosine. Warnings While Using This Medicine: · Tell your doctor if you are pregnant or breastfeeding, or if you have kidney disease, liver disease, diabetes, heart disease, myasthenia gravis, or a history of heart rhythm problems (such as QT prolongation) or seizures. Tell your doctor if you have ever had tendon or joint problems, including rheumatoid arthritis, or if you have received a transplant. · This medicine may cause the following problems: 
¨ Tendinitis and tendon rupture (may happen after treatment ends) ¨ Liver damage ¨ Nerve damage in the arms or legs ¨ Heart rhythm changes ¨ Changes in blood sugar levels · This medicine may make you feel dizzy or lightheaded. Do not drive or do anything else that could be dangerous until you know how this medicine affects you. · This medicine can cause diarrhea. Call your doctor if the diarrhea becomes severe, does not stop, or is bloody. Do not take any medicine to stop diarrhea until you have talked to your doctor. Diarrhea can occur 2 months or more after you stop taking this medicine. · Tell any doctor or dentist who treats you that you are using this medicine. This medicine may affect certain medical test results. · This medicine may make your skin more sensitive to sunlight. Wear sunscreen. Do not use sunlamps or tanning beds. · Call your doctor if your symptoms do not improve or if they get worse. · Keep all medicine out of the reach of children. Never share your medicine with anyone. Possible Side Effects While Using This Medicine:  
Call your doctor right away if you notice any of these side effects: · Allergic reaction: Itching or hives, swelling in your face or hands, swelling or tingling in your mouth or throat, chest tightness, trouble breathing · Blistering, peeling, red skin rash · Change in how much or how often you urinate · Dark urine or pale stools, nausea, vomiting, loss of appetite, stomach pain, yellow skin or eyes · Diarrhea that may contain blood · Fainting, dizziness, or lightheadedness · Fast, slow, or uneven heartbeat, chest pain · Numbness, tingling, or burning pain in your hands, arms, legs, or feet · Pain, stiffness, swelling, or bruises around your ankle, leg, shoulder, or other joint · Seizures, severe headache, unusual thoughts or behaviors, trouble sleeping, feeling anxious, confused, or depressed, seeing, hearing, or feeling things that are not there · Unusual bleeding, bruising, or weakness If you notice these less serious side effects, talk with your doctor: · Mild headache or nausea If you notice other side effects that you think are caused by this medicine, tell your doctor. Call your doctor for medical advice about side effects. You may report side effects to FDA at 1-657-FDA-1513 © 2017 [address] Information is for End User's use only and may not be sold, redistributed or otherwise used for commercial purposes. The above information is an  only. It is not intended as medical advice for individual conditions or treatments. Talk to your doctor, nurse or pharmacist before following any medical regimen to see if it is safe and effective for you. DISCHARGE SUMMARY from Nurse PATIENT INSTRUCTIONS: 
 
 
F-face looks uneven A-arms unable to move or move unevenly S-speech slurred or non-existent T-time-call 911 as soon as signs and symptoms begin-DO NOT go Back to bed or wait to see if you get better-TIME IS BRAIN. Warning Signs of HEART ATTACK Call 911 if you have these symptoms: 
? Chest discomfort. Most heart attacks involve discomfort in the center of the chest that lasts more than a few minutes, or that goes away and comes back. It can feel like uncomfortable pressure, squeezing, fullness, or pain. ? Discomfort in other areas of the upper body. Symptoms can include pain or discomfort in one or both arms, the back, neck, jaw, or stomach. ? Shortness of breath with or without chest discomfort. ? Other signs may include breaking out in a cold sweat, nausea, or lightheadedness. Don't wait more than five minutes to call [address]! Fast action can save your life. Calling 911 is almost always the fastest way to get lifesaving treatment. Emergency Medical Services staff can begin treatment when they arrive  up to an hour sooner than if someone gets to the hospital by car. The discharge information has been reviewed with the patient and mother. The patient and mother verbalized understanding. Discharge medications reviewed with the patient and mother and appropriate educational materials and side effects teaching were provided. ___________________________________________________________________________________________________________________________________ Introducing [address]! Dear Blayne Soren: 
Thank you for requesting a [employer] account. Our records indicate that you already have an active [employer] account. You can access your account anytime at [email or web address] Did you know that you can access your hospital and ER discharge instructions at any time in [employer]? You can also review all of your test results from your hospital stay or ER visit. Additional Information If you have questions, please visit the Frequently Asked Questions section of the [employer] website at [email or web address] Remember, [employer] is NOT to be used for urgent needs. For medical emergencies, dial 911. Now available from your iPhone and Android! Providers Seen During Your Hospitalization Provider Specialty Primary office phone Evelyn Jama MD Urology [phone] Your Primary Care Physician (PCP) Primary Care Physician Office Phone Office Fax Rodney Saba [phone] You are allergic to the following No active allergies Recent Documentation Height Weight BMI OB Status Smoking Status 1.702 m 78.9 kg 27.25 kg/m2 Hysterectomy Never Smoker Emergency Contacts Name Discharge Info Relation Home Work Mobile Crystal Roberto DISCHARGE CAREGIVER [3] Mother [14] [phone] Delon Moore DISCHARGE CAREGIVER [3] Spouse [3] [phone] Patient Belongings The following personal items are in your possession at time of discharge: 
  Dental Appliances: None         Home Medications: None   Jewelry: None  Clothing: Pants, Shirt, Undergarments, Footwear, Socks    Other Valuables: [employer] Please provide this summary of care documentation to your next provider. Signatures-by signing, you are acknowledging that this After Visit Summary has been reviewed with you and you have received a copy. Patient Signature:  ____________________________________________________________ Date:  ____________________________________________________________  
  
Serge Officer Provider Signature:  ____________________________________________________________ Date:  ____________________________________________________________

## 2018-02-13 NOTE — H&P (VIEW-ONLY)
Rohit Morgan  1950        ICD-10-CM ICD-9-CM    1. Mixed incontinence N39.46 788.33 AMB POC URINALYSIS DIP STICK AUTO W/O MICRO      AMB POC PVR, LOYD,POST-VOID RES,US,NON-IMAGING      CULTURE, URINE   2. OAB (overactive bladder) N32.81 596.51 AMB POC PVR, LOYD,POST-VOID RES,US,NON-IMAGING      CULTURE, URINE   3. PFD (pelvic floor dysfunction) M62.89 618.83 AMB POC PVR, LOYD,POST-VOID RES,US,NON-IMAGING      CULTURE, URINE   4. Urge incontinence of urine N39.41 788.31        Assessment and Plan:  UA today: 2+ Blood  PVR today: 72cc    1. Refractoty OAB / Urgency / Frequency / Incontinence   S/p Intravesical Botox injections 100 units on 12/1/16. No benefit. Failed Detrol / Latonia Hacking / Shaq Goad. No Benefit. Reviewed UDS 10/24/16: No DO noted. She demonstrated NADEEM with cough/rapid drops and had a high VLPP of 81 cmH2O/small stream at 150 ml   volume. She voided solely by valsalva with high peak flow and emptied well. Discussed third line therapy management options of increasing Botox injections (300 units) vs. PTNS vs. PNE trial. Patient elects Botox Injection. Discussed third line therapy management options of Botox injections vs. PTNS vs. PNE trial.      Risk/benefits and indications of the procedure were discussed with the patient. Risk of anesthesia, infection, bleeding requiring transfusion, injuring  surrounding organs, muscles, nerves, bowel, persistent pain after surgery, wound infection, need for second or subsequent surgeries. All questions  answered.       Plan for Botox Injections 300 units. Letter Sent. Will call patient to schedule. If no benefit from Botox, will consider PNE trial. Patient does not anticipate any future MRIs. Urine sent for culture preoperatively. Will notify patient of results and prescribe abx as necessary. 2. Pelvic Floor Muscle Dysfunction   Failed PFPT in the past. No benefit.         RTO for Botox injections  More than 35 minutes was spent with this patient of which > 50 % was spent face to face in discussion of diagnosis, treatment options, coordination of care and counseling. Extensive review of patient's old records, imagings and labs. Discussion:   Discussed OAB in terms of pathophysiology, diagnosis, and treatment. We discussed the AUA guidelines for evaluation and treatment of OAB, with focus on treatment with first, second and third line therapy. We discussed OAB pathway including with Initial conservative therapies consisting of a combination of behavioral management, fluid management, and sometimes PFPT. We discussed limiting fluids after dinner and avoiding caffeine and other bladder irritants. Additionally we discussed elevating legs one hour before bed and good voiding habits. Bladder retraining was discussed. Second line therapy consists of medication. Options for medication, drug classes, risks and benefits were discussed in detail. We discussed the list of medications, along with possible side effects of dry mouth, dry eyes, constipation, and blood pressure elevation. If first and second line therapy are ineffective, third line therapy, generally consisting of procedures is considered. Prior to initiation of third line therapy, further evaluation is warranted. Cystoscopy and UDS may be an important part of this evaluation. Consideration will then be given to use of intravesical Botox, PTNS, or Interstim, which were discussed today. Handout was provided, and all questions were answered to patient's satisfaction. Chief Complaint   Patient presents with    Urinary Incontinence       History of Present Illness:  Nighat Christina is a 77 y.o. female who presents today in follow up for OAB. S/p intravesical Botox injections 100 units on 11/16/16. She also has a known history of PFD. Patient was last seen in the office on 12/1/16 with complaints of urinary urgency and frequency.  At that time she was referred to PFPT and started on Myrbetriq 50mg. Today, the patient continues to have bothersome urinary complaints at baseline. Reports incontinence   States that she wears 3 pads per day. Notes that symptoms are very bothersome to her. No benefit with Myrbetriq. Does not report any sensations of incomplete bladder emptying, straining to void, hesitancy or intermittency. Asymptomatic for infection. Denies any gross hematuria or dysuria. No N/V/F/C. PMHx  chronic constipation. Some tingling in her feet occasionally. S/p spinal surgery 2016        Past Medical History:   Diagnosis Date    Allergic rhinitis     Atrophic vaginitis     Back pain     Cystitis     Epigastric pain     Headache(784.0)     migraine    Hypercholesterolemia     Hypertension     Neck pain     Numbness of arm 12/18/06    left    LOREN (obstructive sleep apnea)     Phlebitis of saphenous vein 4/27/07    Left    Thyroid disease     hypothyroidism    Urinary incontinence        Past Surgical History:   Procedure Laterality Date    HX CERVICAL DISKECTOMY  6/7/2016    HX COLONOSCOPY  approximately 2013    HX HYSTERECTOMY  approximately 1985    HX ORTHOPAEDIC  2006    right knee surgery       Social History   Substance Use Topics    Smoking status: Never Smoker    Smokeless tobacco: Never Used    Alcohol use No      Comment: rarely       No Known Allergies    Family History   Problem Relation Age of Onset    Hypertension Mother     Heart Disease Father 62    Hypertension Father     Alzheimer Father     Cancer Other      Unsure    Other Other      cerebral aneurysm       Current Outpatient Prescriptions   Medication Sig Dispense Refill    albuterol (PROVENTIL HFA, VENTOLIN HFA, PROAIR HFA) 90 mcg/actuation inhaler Take 1-2 Puffs by inhalation every four (4) hours as needed for Wheezing. 1 Inhaler 0    benzonatate (TESSALON) 200 mg capsule Take 1 Cap by mouth three (3) times daily as needed for Cough.  30 Cap 0  pravastatin (PRAVACHOL) 40 mg tablet TAKE 1 TABLET NIGHTLY 90 Tab 3    hydroCHLOROthiazide (HYDRODIURIL) 25 mg tablet TAKE 1 TABLET DAILY 90 Tab 3    levothyroxine (SYNTHROID) 112 mcg tablet TAKE 1 TABLET DAILY BEFORE BREAKFAST 90 Tab 3    sertraline (ZOLOFT) 100 mg tablet TAKE 1 TABLET EVERY DAY 90 Tab 3    HYDROcodone-acetaminophen (NORCO)  mg tablet Take 1 Tab by mouth every six (6) hours as needed for Pain. Max Daily Amount: 4 Tabs. 40 Tab 0    amLODIPine (NORVASC) 5 mg tablet TAKE 1 TABLET BY MOUTH DAILY 90 Tab 3    Cholecalciferol, Vitamin D3, (VITAMIN D3) 2,000 unit cap capsule Take  by Mouth Once a Day.  tolterodine ER (DETROL LA) 4 mg ER capsule Take 1 Cap by mouth daily. 90 Cap 3    levothyroxine (SYNTHROID) 112 mcg tablet TAKE 1 TABLET DAILY BEFORE BREAKFAST 90 Tab 3    ascorbic acid (VITAMIN C) 500 mg tablet Take  by mouth.  aspirin delayed-release 81 mg tablet Take  by mouth daily.  vitamin e (E GEMS) 1,000 unit capsule Take 1,000 Units by mouth daily.  cetirizine (ZYRTEC) 10 mg tablet Take  by mouth daily.  naproxen sodium (ALEVE) 220 mg tablet Take 220 mg by mouth two (2) times daily (with meals).  cholecalciferol, vitamin d3, (VITAMIN D) 1,000 unit tablet Take 2,000 Units by mouth daily. Review of Systems  Constitutional: Fever: No  Skin: Rash: No  HEENT: Hearing difficulty: No  Eyes: Blurred vision: No  Cardiovascular: Chest pain: No  Respiratory: Shortness of breath: No  Gastrointestinal: Nausea/vomiting: No  Musculoskeletal: Back pain: No  Neurological: Weakness: No  Psychological: Memory loss: No  Comments/additional findings:           PHYSICAL EXAMINATION:     Visit Vitals    /88    Ht 5' 7\" (1.702 m)    Wt 170 lb (77.1 kg)    BMI 26.63 kg/m2     Constitutional: Well developed, well-nourished female in no acute distress.    CV:  No peripheral swelling noted  Respiratory: No respiratory distress or difficulties  Abdomen:  Soft and nontender. No masses. No hepatosplenomegaly, No CVA tenderness, no Suprapubic tenderness. Skin:  Normal color. No evidence of jaundice. Neuro/Psych:  Patient with appropriate affect. Alert and oriented.         REVIEW OF LABS AND IMAGING:      Results for orders placed or performed in visit on 02/09/18   CULTURE, URINE   Result Value Ref Range    Urine Culture, Routine       Mixed urogenital raghu  Less than 10,000 colonies/mL     AMB POC URINALYSIS DIP STICK AUTO W/O MICRO   Result Value Ref Range    Color (UA POC) Yellow     Clarity (UA POC) Clear     Glucose (UA POC) Negative Negative    Bilirubin (UA POC) Negative Negative    Ketones (UA POC) Negative Negative    Specific gravity (UA POC) 1.025 1.001 - 1.035    Blood (UA POC) 2+ Negative    pH (UA POC) 6.0 4.6 - 8.0    Protein (UA POC) Negative Negative    Urobilinogen (UA POC) 0.2 mg/dL 0.2 - 1    Nitrites (UA POC) Negative Negative    Leukocyte esterase (UA POC) Negative Negative   AMB POC PVR, LOYD,POST-VOID RES,US,NON-IMAGING   Result Value Ref Range    PVR 72 cc         UDS 10/24/2016:  Voiding Diary:      Intake:  Volume: 420-780 ml   Type: Coffee, Tea, Water and Soda      Output:         Voided Volume: 360-1050 ml/24 hr Range:  ml Mean: 140   Frequency: 6-8 x per day   Leaking Episodes: yes   Pads per Day: 2   Type of Pad: thick pad          Uroflometry       Voided Volume 181.5  ml    Maximum Flow Rate 16.7  ml/sec    Average Flow Rate 5.3  ml/sec    Voiding Time 1:30  ml/sec    Flow Pattern intermittent    Post Void Residual Cath 15 ml    Comments: Not a normal void per patient         Cystometrogram         Sensation normal    First sensation 133 ml    Strong desire 241 ml    Capacity 342 ml    Compliance: normal       Detrusor overactivity:no              Stress Incontinence  Reduction of prolapse: no   Valsalva: Yes: Pressure 81 cm Volume: 150 ml Type:small stream   Cough: Yes: Pressure > 75 cm Volume: 150 ml  Type:rapid drops   Stress induced overactivity: Cough: no  Valsalva: no      Micturition         Voided Volume: 375 ml    Detrusor Pressure:               External sphincter: Unable to assess due to straining.                                           Post Void Residual: < 20    Comments: Per diary, she had low 24 hr I&O volumes. No DO noted. She demonstrated NADEEM with cough/rapid drops and had a high VLPP of 81 cmH2O/small stream at 150 ml volume. She voided solely by valsalva with high peak flow and emptied well.      A copy of today's office visit with all pertinent imaging results and labs were sent to the referring MD Loc De La Rosa MD on 2/11/2018       Medical Documentation is provided with the assistance of Marilin Story.  Martha Medical Scribe for Loc Orozco MD on 2/11/2018

## 2018-02-13 NOTE — ROUTINE PROCESS
TRANSFER - OUT REPORT:    Verbal report given to Lilo Burkett on [place] Fillers  being transferred to [facility] for routine progression of care       Report consisted of patients Situation, Background, Assessment and   Recommendations(SBAR). Information from the following report(s) SBAR, OR Summary, Intake/Output and MAR was reviewed with the receiving nurse. Opportunity for questions and clarification was provided.       Patient transported with:   [employer]

## 2018-02-14 ENCOUNTER — PATIENT OUTREACH (OUTPATIENT)
Dept: INTERNAL MEDICINE CLINIC | Age: 68
End: 2018-02-14

## 2018-02-14 ENCOUNTER — TELEPHONE (OUTPATIENT)
Dept: INTERNAL MEDICINE CLINIC | Age: 68
End: 2018-02-14

## 2018-02-14 NOTE — PROGRESS NOTES
Jed Farooq is a 77 y.o. female   This patient was received as a referral from inpatient admission     Contact made: within 2 business days post discharge    Patient admitted to [facility]; 2/1/2018 to 2/13/2018 for c/o scheduled cystoscopy. Patient verified two patient identifiers. Introduced self, role and reason for call. Patient presenting symptoms:   Overactive bladder  Urinary incontinence     Patient denies:  Pain  SOB  Bleeding  Nausea  Vomiting  Dizziness  Lightheadedness  Fever  Chills     Patient reports:  \"I'm doing fine, I did not need any pain medicine\"  Will call Dr. Richmond Goodpasture to schedule follow up  This is the second time this procedure was done  Hoping this time is works    Barriers:   Financial: None identified at this time   Patients comprehension of disease: Patient verbalizes understanding of discharge plan and special follow up. Transportation: Self    Plan of care:  1. Take medications as prescribed  2. Attend all follow up appointments    ADL's:  Patient is independent of all ADL's. Adherence to previous treatment and likelihood for follow up:  Patient verbalizes understanding of discharge plan and special follow up.     Advance Care Planning:   Not on file     Appointments:  2/21/2018 at 1430 with Dr. Perez Form  Need 2 week follow up with Dr. Richmond Goodpasture    Patient was given the opportunity to ask questions. Contact information was provided for future reference or further questions. Goals      Prevent complications post hospitalization.             No admissions within 30 days post discharge, 2/13/2018

## 2018-02-14 NOTE — PROGRESS NOTES
Kim Garcia is a 77 y.o. female   This patient was received as a referral from inpatient admission     Nurse Navigator attempted to contact patient post discharge from [facility]; 2/1/2018 to 2/13/2018 for c/o scheduled cystoscopy. Left message for the patient to contact the office on the answering machine. Patient presenting symptoms:   Overactive bladder  Urinary incontinence     Plan of care:  1. Take medications as prescribed  2. Attend all follow up appointments    Advance Care Planning:   Not on file     Goals      Prevent complications post hospitalization.             No admissions within 30 days post discharge, 2/13/2018

## 2018-02-21 ENCOUNTER — OFFICE VISIT (OUTPATIENT)
Dept: INTERNAL MEDICINE CLINIC | Age: 68
End: 2018-02-21

## 2018-02-21 VITALS
HEART RATE: 78 BPM | TEMPERATURE: 98.5 F | RESPIRATION RATE: 14 BRPM | OXYGEN SATURATION: 98 % | WEIGHT: 174 LBS | BODY MASS INDEX: 27.31 KG/M2 | SYSTOLIC BLOOD PRESSURE: 144 MMHG | DIASTOLIC BLOOD PRESSURE: 76 MMHG | HEIGHT: 67 IN

## 2018-02-21 DIAGNOSIS — I10 ESSENTIAL HYPERTENSION WITH GOAL BLOOD PRESSURE LESS THAN 140/90: Primary | ICD-10-CM

## 2018-02-21 DIAGNOSIS — E78.5 HYPERLIPIDEMIA LDL GOAL <130: ICD-10-CM

## 2018-02-21 DIAGNOSIS — E03.9 ACQUIRED HYPOTHYROIDISM: ICD-10-CM

## 2018-02-21 RX ORDER — BACLOFEN 10 MG/1
TABLET ORAL 3 TIMES DAILY
COMMUNITY
End: 2018-02-21 | Stop reason: SDUPTHER

## 2018-02-21 RX ORDER — BACLOFEN 10 MG/1
10 TABLET ORAL 3 TIMES DAILY
Qty: 270 TAB | Refills: 1 | Status: SHIPPED | OUTPATIENT
Start: 2018-02-21 | End: 2019-12-24 | Stop reason: ALTCHOICE

## 2018-02-21 NOTE — PROGRESS NOTES
1. Have you been to the ER, urgent care clinic or hospitalized since your last visit? NO.     2. Have you seen or consulted any other health care providers outside of the [address] [place] [address] since your last visit (Include any pap smears or colon screening)? NO      Do you have an Advanced Directive? NO    Would you like information on Advanced Directives?  NO

## 2018-02-21 NOTE — PROGRESS NOTES
Maynor Malhotra 1950, is a 77 y.o. female, who is seen today for reevaluation of hypertension hypothyroidism depression hypovitaminosis D overweight and muscle spasms. She uses the dorsal as needed but has not refilled the prescription for over 2 years. She is getting low and would like to have a refill on that. She takes her other medicine regularly. She is not following her diet quite as well as she was last visit and her cholesterols gone up somewhat. She has cut back on sertraline to 50 mg daily and plans to stop using this as a trial.  She also notes that she received her second Botox injection for urinary incontinence just a few days ago and so far that has not been helpful. Current Outpatient Prescriptions   Medication Sig Dispense Refill    baclofen (LIORESAL) 10 mg tablet Take 1 Tab by mouth three (3) times daily. 270 Tab 1    levoFLOXacin (LEVAQUIN) 500 mg tablet Take 1 Tab by mouth daily. 5 Tab 0    pravastatin (PRAVACHOL) 40 mg tablet TAKE 1 TABLET NIGHTLY 90 Tab 3    sertraline (ZOLOFT) 100 mg tablet TAKE 1 TABLET EVERY DAY 90 Tab 3    amLODIPine (NORVASC) 5 mg tablet TAKE 1 TABLET BY MOUTH DAILY 90 Tab 3    levothyroxine (SYNTHROID) 112 mcg tablet TAKE 1 TABLET DAILY BEFORE BREAKFAST 90 Tab 3    ascorbic acid (VITAMIN C) 500 mg tablet Take  by mouth.  vitamin e (E GEMS) 1,000 unit capsule Take 1,000 Units by mouth daily.  cetirizine (ZYRTEC) 10 mg tablet Take  by mouth daily.        Past Medical History:   Diagnosis Date    Allergic rhinitis     Atrophic vaginitis     Back pain     Cystitis     Epigastric pain     Headache(784.0)     migraine    Hypercholesterolemia     Hypertension     Neck pain     Numbness of arm 12/18/06    left    LOREN (obstructive sleep apnea)     Phlebitis of saphenous vein 4/27/07    Left    Thyroid disease     hypothyroidism    Urinary incontinence        Visit Vitals    /76    Pulse 78    Temp 98.5 °F (36.9 °C) (Oral)    Resp 14    Ht 5' 7\" (1.702 m)    Wt 174 lb (78.9 kg)    SpO2 98%    BMI 27.25 kg/m2     Carotids are 2+ without bruits. Lungs are clear to percussion. Good breath sounds with no wheezing or crackles. Heart reveals a regular rhythm with normal S1 and S2 no murmur gallop click or rub. Apical impulse is in the fifth interspace at the midclavicular line. Abdomen is soft and nontender with no hepatosplenomegaly or masses and no bruits. Extremities reveal no clubbing cyanosis or edema. Pulses are 2+. Results for orders placed or performed in visit on 02/09/18   CULTURE, URINE   Result Value Ref Range    Urine Culture, Routine       Mixed urogenital raghu  Less than 10,000 colonies/mL     AMB POC URINALYSIS DIP STICK AUTO W/O MICRO   Result Value Ref Range    Color (UA POC) Yellow     Clarity (UA POC) Clear     Glucose (UA POC) Negative Negative    Bilirubin (UA POC) Negative Negative    Ketones (UA POC) Negative Negative    Specific gravity (UA POC) 1.025 1.001 - 1.035    Blood (UA POC) 2+ Negative    pH (UA POC) 6.0 4.6 - 8.0    Protein (UA POC) Negative Negative    Urobilinogen (UA POC) 0.2 mg/dL 0.2 - 1    Nitrites (UA POC) Negative Negative    Leukocyte esterase (UA POC) Negative Negative   AMB POC PVR, LOYD,POST-VOID RES,US,NON-IMAGING   Result Value Ref Range    PVR 72 cc     Assessment: #1. Hypertension fairly well controlled, systolic blood pressure little higher than usual.  She will continue healthy diet and amlodipine 5 mg daily. #2. Hypothyroidism clinically euthyroid. She will continue levothyroxine 112 mcg daily. #3. Hyperlipidemia not doing as well. She will continue pravastatin 40 mg each evening and get back on her diet. She will work on weight loss. #4.  History of anxiety and some slight depression doing well. She will try stopping sertraline and if not doing well, will go back on 100 mg, one half tablet daily.   #5.  Urinary incontinence, hopefully her second Botox which he received a few days ago will be helpful but so far it has not been helpful. #6.  Muscle spasms occasionally, will renew the dorsal for her 10 mg 3 times daily as needed. Follow-up in August for a complete evaluation    oCrona Keith MD FACP    Please note: This document has been produced using voice recognition software. Unrecognized errors in transcription may be present.

## 2018-02-21 NOTE — MR AVS SNAPSHOT
[address] [facility] [address] 
 
 
 [address], [address] [place] [address] [facility] 
[phone] Patient: Jamari Salinas MRN: [id number] LVP:[id number] Visit Information Date & Time Provider Department Dept. Phone Encounter #  
 2/21/2018  2:30 PM Kerwin Garcia MD [facility] [phone] [id number] Follow-up Instructions Follow-up and Disposition History Your Appointments 8/3/2018  9:05 AM  
LAB with IOC NURSE VISIT [facility] ([address]) Appt Note: lab  
 [address], [address] [facility] [address]  
  
   
 [address], [address]  
  
    
 8/10/2018 10:30 AM  
PHYSICAL with Kerwin Garcia MD  
[facility] [address] [place], [place] [address]) Appt Note: Physical  
 [address], [address] [place] [address] [address]  
  
   
 [address], [address]  
  
    
  
 2/26/2018 11:15 AM  
Any with Debbie Zepeda MD  
[facility] ([facility]) Appt Note: 10-14 day post op PVR  
 [address] 
[address] [facility] [address]  
[address] [facility] [address] [facility] [address] Upcoming Health Maintenance Date Due DTaP/Tdap/Td series (1 - Tdap) 1/10/1969 Pneumococcal 65+ Low/Medium Risk (2 of 2 - PCV13) 5/23/2015 MEDICARE YEARLY EXAM 12/22/2016 GLAUCOMA SCREENING Q2Y 3/11/2017 BREAST CANCER SCRN MAMMOGRAM 11/2/2018 COLONOSCOPY 2/21/2022 Allergies as of 2/21/2018  Review Complete On: 2/21/2018 By: Kerwin Garcia MD  
 No Known Allergies Current Immunizations  Reviewed on 8/9/2017 Name Date Pneumococcal Polysaccharide (PPSV-23) 5/23/2014  2:29 PM  
 Zoster 4/13/2009 Not reviewed this visit You Were Diagnosed With   
  
 Codes Comments  Essential hypertension with goal blood pressure less than 140/90    - Primary ICD-10-CM: I10 
ICD-9-CM: 401.9 Hyperlipidemia LDL goal <130     ICD-10-CM: E78.5 ICD-9-CM: 272.4 Acquired hypothyroidism     ICD-10-CM: E03.9 ICD-9-CM: 679. 9 Vitals BP Pulse Temp Resp Height(growth percentile) Weight(growth percentile) 144/76 78 98.5 °F (36.9 °C) (Oral) 14 5' 7\" (1.702 m) 174 lb (78.9 kg) SpO2 BMI OB Status Smoking Status 98% 27.25 kg/m2 Hysterectomy Never Smoker Vitals History BMI and BSA Data Body Mass Index Body Surface Area  
 27.25 kg/m 2 1.93 m 2 Preferred Pharmacy Pharmacy Name Phone [address] [facility] [address] [phone] Your Updated Medication List  
  
   
This list is accurate as of 2/21/18  3:10 PM.  Always use your most recent med list. amLODIPine 5 mg tablet Commonly known as:  Siddhartha Otter TAKE 1 TABLET BY MOUTH DAILY  
  
 baclofen 10 mg tablet Commonly known as:  LIORESAL Take 1 Tab by mouth three (3) times daily. levoFLOXacin 500 mg tablet Commonly known as:  Yola Stalling Take 1 Tab by mouth daily. levothyroxine 112 mcg tablet Commonly known as:  SYNTHROID  
TAKE 1 TABLET DAILY BEFORE BREAKFAST  
  
 pravastatin 40 mg tablet Commonly known as:  PRAVACHOL  
TAKE 1 TABLET NIGHTLY  
  
 sertraline 100 mg tablet Commonly known as:  ZOLOFT  
TAKE 1 TABLET EVERY DAY  
  
 VITAMIN C 500 mg tablet Generic drug:  ascorbic acid (vitamin C) Take  by mouth.  
  
 vitamin e 1,000 unit capsule Commonly known as:  E GEMS Take 1,000 Units by mouth daily. ZyrTEC 10 mg tablet Generic drug:  cetirizine Take  by mouth daily. Prescriptions Sent to Pharmacy Refills  
 baclofen (LIORESAL) 10 mg tablet 1 Sig: Take 1 Tab by mouth three (3) times daily. Class: Normal  
 Pharmacy: [address] [place] [address], [address]  #: [phone]  Route: Oral  
  
To-Do List   
 Around 08/01/2018 Lab:  CBC WITH AUTOMATED DIFF Around 08/01/2018 Lab:  LIPID PANEL Around 08/01/2018 Lab:  METABOLIC PANEL, COMPREHENSIVE Around 08/01/2018 Lab:  T4, FREE Around 08/01/2018 Lab:  TSH 3RD GENERATION Introducing [facility]! Dear Grey Skaggs: 
Thank you for requesting a [email or web address] account. Our records indicate that you already have an active [email or web address] account. You can access your account anytime at [email or web address] Did you know that you can access your hospital and ER discharge instructions at any time in [email or web address]? You can also review all of your test results from your hospital stay or ER visit. Additional Information If you have questions, please visit the Frequently Asked Questions section of the [email or web address] website at [email or web address] Remember, [email or web address] is NOT to be used for urgent needs. For medical emergencies, dial 911. Now available from your iPhone and Android! Please provide this summary of care documentation to your next provider. Your primary care clinician is listed as Denisse Martinez. Kimberley Bland. If you have any questions after today's visit, please call [phone].

## 2018-03-16 ENCOUNTER — PATIENT OUTREACH (OUTPATIENT)
Dept: INTERNAL MEDICINE CLINIC | Age: 68
End: 2018-03-16

## 2018-03-16 NOTE — PROGRESS NOTES
Goals Addressed             Most Recent     COMPLETED: Prevent complications post hospitalization. On track (3/16/2018)             No admissions within 30 days post discharge, 2/13/2018  Episode closed: no hospitalization or ED admission post 30 days from discharge.

## 2018-03-20 ENCOUNTER — OFFICE VISIT (OUTPATIENT)
Dept: INTERNAL MEDICINE CLINIC | Age: 68
End: 2018-03-20

## 2018-03-20 VITALS
BODY MASS INDEX: 26.53 KG/M2 | TEMPERATURE: 98 F | WEIGHT: 169 LBS | SYSTOLIC BLOOD PRESSURE: 126 MMHG | DIASTOLIC BLOOD PRESSURE: 82 MMHG | RESPIRATION RATE: 14 BRPM | HEIGHT: 67 IN | HEART RATE: 77 BPM | OXYGEN SATURATION: 100 %

## 2018-03-20 DIAGNOSIS — H92.02 LEFT EAR PAIN: ICD-10-CM

## 2018-03-20 DIAGNOSIS — R21 RASH AND NONSPECIFIC SKIN ERUPTION: Primary | ICD-10-CM

## 2018-03-20 RX ORDER — PREDNISONE 20 MG/1
TABLET ORAL
Qty: 18 TAB | Refills: 0 | Status: SHIPPED | OUTPATIENT
Start: 2018-03-20 | End: 2018-08-10 | Stop reason: ALTCHOICE

## 2018-03-20 NOTE — PROGRESS NOTES
1. Have you been to the ER, urgent care clinic or hospitalized since your last visit? NO.     2. Have you seen or consulted any other health care providers outside of the [address] [place] [address] since your last visit (Include any pap smears or colon screening)? NO      Do you have an Advanced Directive? NO    Would you like information on Advanced Directives?  NO

## 2018-03-20 NOTE — MR AVS SNAPSHOT
Ann Pulse 
 
 
 [address], [address] 706 [facility] 
[phone] Patient: Thao Mckeon MRN: [id number] LU Visit Information Date & Time Provider Department Dept. Phone Encounter #  
 3/20/2018  3:00 PM Bhavesh Borjas [facility] [phone] Your Appointments 2018  8:45 AM  
PROCEDURE with Kandice Knowles MD  
[facility] ([facility]) Appt Note: PNE Trial  
 [address] [facility] [place] [facility]  
  
    
 8/3/2018  9:05 AM  
LAB with [facility] NURSE VISIT [facility] ([facility]) Appt Note: lab  
 [address], [address] [facility] [address] [place]  
  
   
 [address], [address]  
  
    
 8/10/2018 10:30 AM  
PHYSICAL with Stacia Sims MD  
[facility]) Appt Note: Physical  
 [address], [address] [address] [address] [place]  
  
   
 [address], [phone] [phone]  
  
    
  
 3/26/2018  2:45 PM  
Any with Kandice Knowles MD  
[facility] ([facility]) Appt Note: EP- Return in about 1 month  
 7[address] [facility] [address]  
  
   
 709 [facility] [address] [address] [address] Upcoming Health Maintenance Date Due DTaP/Tdap/Td series (1 - Tdap) 1/10/1969 Bone Densitometry (Dexa) Screening 1/10/2013 Pneumococcal 65+ Low/Medium Risk (2 of 2 - PCV13) 2015 GLAUCOMA SCREENING Q2Y 3/11/2017 BREAST CANCER SCRN MAMMOGRAM 2018 COLONOSCOPY 2022 Allergies as of 3/20/2018  Review Complete On: 3/20/2018 By: Pavel Cunningham LPN No Known Allergies Current Immunizations  Reviewed on 2017 Name Date Pneumococcal Polysaccharide (PPSV-23) 5/23/2014  2:29 PM  
 Zoster 4/13/2009 Not reviewed this visit You Were Diagnosed With   
  
 Codes Comments Rash and nonspecific skin eruption    -  Primary ICD-10-CM: R21 
ICD-9-CM: 782.1 Left ear pain     ICD-10-CM: H92.02 
ICD-9-CM: 388.70 Vitals BP Pulse Temp Resp Height(growth percentile) Weight(growth percentile) 126/82 (BP 1 Location: Left arm, BP Patient Position: Sitting) 77 98 °F (36.7 °C) (Oral) 14 5' 7\" (1.702 m) 169 lb (76.7 kg) SpO2 BMI OB Status Smoking Status 100% 26.47 kg/m2 Hysterectomy Never Smoker Vitals History BMI and BSA Data Body Mass Index Body Surface Area  
 26.47 kg/m 2 1.9 m 2 Preferred Pharmacy Pharmacy Name Phone [address] [phone] Your Updated Medication List  
  
   
This list is accurate as of 3/20/18  3:26 PM.  Always use your most recent med list. amLODIPine 5 mg tablet Commonly known as:  Allena Ping TAKE 1 TABLET BY MOUTH DAILY  
  
 baclofen 10 mg tablet Commonly known as:  LIORESAL Take 1 Tab by mouth three (3) times daily. levothyroxine 112 mcg tablet Commonly known as:  SYNTHROID  
TAKE 1 TABLET DAILY BEFORE BREAKFAST  
  
 pravastatin 40 mg tablet Commonly known as:  PRAVACHOL  
TAKE 1 TABLET NIGHTLY  
  
 sertraline 100 mg tablet Commonly known as:  ZOLOFT  
TAKE 1 TABLET EVERY DAY  
  
 VITAMIN C 500 mg tablet Generic drug:  ascorbic acid (vitamin C) Take  by mouth.  
  
 vitamin e 1,000 unit capsule Commonly known as:  E GEMS Take 1,000 Units by mouth daily. ZyrTEC 10 mg tablet Generic drug:  cetirizine Take  by mouth daily. Introducing \[facility]\"" & HEALTH SERVICES! Dear Tori Varghese: 
Thank you for requesting a [employer] account. Our records indicate that you already have an active [employer] account.   You can access your account anytime at [email or web address] Did you know that you can access your hospital and ER discharge instructions at any time in [employer]? You can also review all of your test results from your hospital stay or ER visit. Additional Information If you have questions, please visit the Frequently Asked Questions section of the [employer] website at [email or web address] Remember, [employer] is NOT to be used for urgent needs. For medical emergencies, dial 911. Now available from your iPhone and Android! Please provide this summary of care documentation to your next provider. Your primary care clinician is listed as Pam Hinojosa. Nima Maria. If you have any questions after today's visit, please call [phone].

## 2018-03-20 NOTE — PROGRESS NOTES
HPI/History  Alana Alarcon is a 77 y.o.  female who presents for evaluation. Pt c/o rash for about 10 days. Generalized and pruritic. No other anaphylactoid sxs. No known changes in diet, cosmetics, household products, or other. No med changes outside of tapering then d/c zoloft but this was some time ago and does not sound related. Pt also reports occasional stabbing pain in left ear since 3/16. Has not been present at any point today. Denies any rash noted around this area or scalp. No other ear or hearing sxs. No other HEENT sxs. Patient Active Problem List   Diagnosis Code    LOREN (obstructive sleep apnea) G47.33    Allergic rhinitis J30.9    Atrophic vaginitis N95.2    Headache(784.0) R51    Depression F32.9    Shoulder pain, left M25.512    Acquired hypothyroidism E03.9    Hypovitaminosis D E55.9    Essential hypertension with goal blood pressure less than 140/90 I10    Superficial phlebitis of left leg I80.02    Mixed incontinence N39.46    Degenerative disc disease, cervical M50.30    Hyperlipidemia LDL goal <130 E78.5    Urinary incontinence R32     Past Medical History:   Diagnosis Date    Allergic rhinitis     Atrophic vaginitis     Back pain     Cystitis     Epigastric pain     Headache(784.0)     migraine    Hypercholesterolemia     Hypertension     Neck pain     Numbness of arm 12/18/06    left    LOREN (obstructive sleep apnea)     Phlebitis of saphenous vein 4/27/07    Left    Thyroid disease     hypothyroidism    Urinary incontinence      Past Surgical History:   Procedure Laterality Date    HX CERVICAL DISKECTOMY  6/7/2016    HX COLONOSCOPY  approximately 2013    HX HYSTERECTOMY  approximately 1985    HX ORTHOPAEDIC  2006    right knee surgery     Social History     Social History    Marital status:      Spouse name: N/A    Number of children: N/A    Years of education: N/A     Occupational History    Not on file.      Social History Main Topics    Smoking status: Never Smoker    Smokeless tobacco: Never Used    Alcohol use No      Comment: rarely    Drug use: No    Sexual activity: Yes     Partners: Male     Birth control/ protection: Surgical     Other Topics Concern    Not on file     Social History Narrative     Family History   Problem Relation Age of Onset    Hypertension Mother     Heart Disease Father 62    Hypertension Father     Alzheimer Father     Cancer Other      Unsure    Other Other      cerebral aneurysm     Current Outpatient Prescriptions   Medication Sig    predniSONE (DELTASONE) 20 mg tablet Take 3 tabs by mouth daily x 3 days, then 2 tabs daily x 3 days, then 1 tab daily x 3 days.  baclofen (LIORESAL) 10 mg tablet Take 1 Tab by mouth three (3) times daily.  pravastatin (PRAVACHOL) 40 mg tablet TAKE 1 TABLET NIGHTLY    amLODIPine (NORVASC) 5 mg tablet TAKE 1 TABLET BY MOUTH DAILY    levothyroxine (SYNTHROID) 112 mcg tablet TAKE 1 TABLET DAILY BEFORE BREAKFAST    vitamin e (E GEMS) 1,000 unit capsule Take 1,000 Units by mouth daily.  cetirizine (ZYRTEC) 10 mg tablet Take  by mouth daily. No current facility-administered medications for this visit. No Known Allergies    Review of Systems  Aside from those included in HPI, remainder of ROS negative. Physical Examination  Visit Vitals    /82 (BP 1 Location: Left arm, BP Patient Position: Sitting)    Pulse 77    Temp 98 °F (36.7 °C) (Oral)    Resp 14    Ht 5' 7\" (1.702 m)    Wt 169 lb (76.7 kg)    SpO2 100%    BMI 26.47 kg/m2       General - Alert and in no acute distress. Pt appears well, comfortable, and in good spirits. Pleasant, engaging. Nontoxic. Not anxious, non-diaphoretic. Mental status - Appropriate mood, behavior, speech content, dress, and thought processes. Head/face - No signs of angioedema or other anaphylactoid findings. Other exam as noted. Eyes - Pupils equal and reactive, extraocular movements intact.  No erythema or discharge. Ears - Right with excess cerumen limiting view. Left canal and TM unremarkable. Left periauricular area unremarkable/without notable findings. Nose - No erythema. No rhinorrhea. Mouth - Mucous membranes moist. Oropharynx unremarkable. Normal phonation. Neck - Supple without rigidity. Lymph - No periauricular, perimandibular, or ant/post cervical tenderness or swelling. Pulm - No issues or distress. Full, complete sentences. No tachypnea, retractions, or cyanosis. Good respiratory effort. Clear to auscultation bilat. Cardiovascular - Normal rate. Skin - Dispersed maculopapular lesions most c/w allergic reaction of unknown origin. No other concerning findings. Assessment and Plan  1. Rash most c/w allergic reaction - Unknown origin, discussed possible causes/factors. No other anaphylactoid sxs. Will tx with steroid taper and prn benadryl. Return/call if needed or visit ED if ominous developments. 2. Intermittent left ear pain - No identifiable cause. No signs of infection or zoster. Possible ETD or neuralgia. Not present at any point today. May benefit from steroids but otherwise will observe for now. Further planning as warranted. Pt happily agrees with plan. PLEASE NOTE:   This document has been produced using voice recognition software. Unrecognized errors in transcription may be present.     [facility] [address] [place] [address]  [phone]  3/20/2018

## 2018-04-12 RX ORDER — LEVOTHYROXINE SODIUM 112 UG/1
TABLET ORAL
Qty: 90 TAB | Refills: 3 | Status: SHIPPED | OUTPATIENT
Start: 2018-04-12 | End: 2019-03-11 | Stop reason: SDUPTHER

## 2018-04-12 RX ORDER — PRAVASTATIN SODIUM 40 MG/1
TABLET ORAL
Qty: 90 TAB | Refills: 3 | Status: SHIPPED | OUTPATIENT
Start: 2018-04-12 | End: 2019-03-11 | Stop reason: SDUPTHER

## 2018-04-12 RX ORDER — SERTRALINE HYDROCHLORIDE 100 MG/1
TABLET, FILM COATED ORAL
Qty: 90 TAB | Refills: 3 | Status: SHIPPED | OUTPATIENT
Start: 2018-04-12 | End: 2019-03-11 | Stop reason: SDUPTHER

## 2018-04-12 RX ORDER — AMLODIPINE BESYLATE 5 MG/1
TABLET ORAL
Qty: 90 TAB | Refills: 3 | Status: SHIPPED | OUTPATIENT
Start: 2018-04-12 | End: 2019-12-24 | Stop reason: ALTCHOICE

## 2018-05-03 RX ORDER — CHOLECALCIFEROL (VITAMIN D3) 125 MCG
2000 CAPSULE ORAL DAILY
COMMUNITY
End: 2022-03-09 | Stop reason: ALTCHOICE

## 2018-05-07 ENCOUNTER — ANESTHESIA EVENT (OUTPATIENT)
Dept: SURGERY | Age: 68
End: 2018-05-07
Payer: MEDICARE

## 2018-05-07 RX ORDER — CEFAZOLIN SODIUM 2 G/50ML
2 SOLUTION INTRAVENOUS ONCE
Status: CANCELLED | OUTPATIENT
Start: 2018-05-08 | End: 2018-05-08

## 2018-05-07 NOTE — H&P
Assessment and Plan:  UA today: 2+ Blood   PVR today: 36  cc      Refractory OAB / Urgency / Frequency / Incontinence    S/p Intravesical Botox injections 300 units on 2/13/18. S/p Intravesical Botox injections 100 units on 12/1/16. No benefit. Failed Detrol / Ditropan / Myrbetriq and Intravesical Botox - No Benefit.            UDS 10/24/16: No DO noted. She demonstrated NADEEM with cough/rapid drops and had a high VLPP of 81   cmH2O/small stream at 150 ml volume. She voided solely by valsalva with high  peak flow and emptied well.     Significant Improvement with PNE trial. Schedule for Complete InterStim implantation. R/B/A discussed. Risk/benefits and indications of the procedure were discussed with the patient.                                 2. Pelvic Floor Muscle Dysfunction     Failed PFPT in the past. No benefit.                           Chief Complaint   Patient presents with    Overactive Bladder       Botox F/U- 2/13/18         History of Present Illness:  John Chow is a 77 y.o. female who presents today postoperatively s/p cysto, Intravesical Botox Injections 300 Units on 2/13/18 in treatment of refractory OAB. Patient is also s/p Intravesical Botox injections 100 units on 12/1/16; no benefit. Patient has failed Detrol, Ditropan, and Myrbetriq in the past without any benefit. No SEs noted. Patient had a UDS done on 10/24/16 which revealed She demonstrated NADEEM with cough/rapid drops and had a high VLPP of 81 cmH2O/small stream at 150 ml volume. She voided solely by valsalva with high peak flow and emptied well. No DO was noted.         Today, the patient continues to have bothersome urinary complaints. No benefit from Botox injections. Continues to have complaints of urgency, frequency, and incontinence. States that she wear diapers throughout the day.      Asymptomatic for infection today. Denies any gross hematuria or dysuria.   No N/V/F/C.            PMHx  chronic constipation. Some tingling in her feet occasionally. S/p spinal surgery 2016              UDS 10/24/2016:  Voiding Diary:       Intake:  Volume: 420-780 ml   Type: Coffee, Tea, Water and Soda       Output:              Voided Volume: 360-1050 ml/24 hr Range:  ml Mean: 140   Frequency: 6-8 x per day   Leaking Episodes: yes   Pads per Day: 2   Type of Pad: thick pad           Uroflometry          Voided Volume 181.5  ml     Maximum Flow Rate 16.7  ml/sec     Average Flow Rate 5.3  ml/sec     Voiding Time 1:30  ml/sec     Flow Pattern intermittent     Post Void Residual Cath 15 ml     Comments: Not a normal void per patient           Cystometrogram              Sensation normal     First sensation 133 ml     Strong desire 241 ml     Capacity 342 ml     Compliance: normal         Detrusor overactivity:no                  Stress Incontinence  Reduction of prolapse: no   Valsalva: Yes: Pressure 81 cm Volume: 150 ml Type:small stream   Cough: Yes: Pressure > 75 cm Volume: 150 ml  Type:rapid drops   Stress induced overactivity: Cough: no  Valsalva: no       Micturition              Voided Volume: 375 ml     Detrusor Pressure:                    External sphincter: Unable to assess due to straining.                                                         Post Void Residual: < 20    Comments: Per diary, she had low 24 hr I&O volumes. No DO noted. She demonstrated NADEEM with cough/rapid drops and had a high VLPP of 81 cmH2O/small stream at 150 ml volume.  She voided solely by valsalva with high peak flow and emptied well.              Past Medical History:   Diagnosis Date    Allergic rhinitis      Atrophic vaginitis      Back pain      Cystitis      Epigastric pain      Headache(784.0)       migraine    Hypercholesterolemia      Hypertension      Neck pain      Numbness of arm 12/18/06     left    LOREN (obstructive sleep apnea)      Phlebitis of saphenous vein 4/27/07     Left    Thyroid disease       hypothyroidism    Urinary incontinence                 Past Surgical History:   Procedure Laterality Date    HX CERVICAL DISKECTOMY   6/7/2016    HX COLONOSCOPY   approximately 2013    HX HYSTERECTOMY   approximately 1985    HX ORTHOPAEDIC   2006     right knee surgery                Social History   Substance Use Topics    Smoking status: Never Smoker    Smokeless tobacco: Never Used    Alcohol use No         Comment: rarely         No Known Allergies            Family History   Problem Relation Age of Onset    Hypertension Mother      Heart Disease Father 62    Hypertension Father      Alzheimer Father      Cancer Other         Unsure    Other Other         cerebral aneurysm                Current Outpatient Prescriptions   Medication Sig Dispense Refill    baclofen (LIORESAL) 10 mg tablet Take 1 Tab by mouth three (3) times daily.  270 Tab 1    pravastatin (PRAVACHOL) 40 mg tablet TAKE 1 TABLET NIGHTLY 90 Tab 3    sertraline (ZOLOFT) 100 mg tablet TAKE 1 TABLET EVERY DAY 90 Tab 3    amLODIPine (NORVASC) 5 mg tablet TAKE 1 TABLET BY MOUTH DAILY 90 Tab 3    levothyroxine (SYNTHROID) 112 mcg tablet TAKE 1 TABLET DAILY BEFORE BREAKFAST 90 Tab 3    ascorbic acid (VITAMIN C) 500 mg tablet Take  by mouth.        vitamin e (E GEMS) 1,000 unit capsule Take 1,000 Units by mouth daily.        cetirizine (ZYRTEC) 10 mg tablet Take  by mouth daily.                   Review of Systems  Constitutional: Fever: No  Skin: Rash: No  HEENT: Hearing difficulty: No  Eyes: Blurred vision: No  Cardiovascular: Chest pain: No  Respiratory: Shortness of breath: No  Gastrointestinal: Nausea/vomiting: No  Musculoskeletal: Back pain: No  Neurological: Weakness: No  Psychological: Memory loss: No  Comments/additional findings:               PHYSICAL EXAMINATION:           Visit Vitals    /84    Ht 5' 7\" (1.702 m)    Wt 174 lb (78.9 kg)    BMI 27.25 kg/m2      Constitutional: Well developed, well-nourished female in no acute distress. CV:  No peripheral swelling noted  Respiratory: No respiratory distress or difficulties  Abdomen:  Soft and nontender. No masses. No hepatosplenomegaly, No CVA tenderness, no Suprapubic tenderness. Skin:  Normal color. No evidence of jaundice. Neuro/Psych:  Patient with appropriate affect. Alert and oriented.          REVIEW OF LABS AND IMAGING:             Results for orders placed or performed in visit on 02/26/18   AMB POC URINALYSIS DIP STICK AUTO W/O MICRO   Result Value Ref Range     Color (UA POC) Yellow       Clarity (UA POC) Clear       Glucose (UA POC) Negative Negative     Bilirubin (UA POC) Negative Negative     Ketones (UA POC) Negative Negative     Specific gravity (UA POC) 1.020 1.001 - 1.035     Blood (UA POC) 2+ Negative     pH (UA POC) 6.0 4.6 - 8.0     Protein (UA POC) Negative Negative     Urobilinogen (UA POC) 1 mg/dL 0.2 - 1     Nitrites (UA POC) Negative Negative     Leukocyte esterase (UA POC) Negative Negative   AMB POC PVR, LOYD,POST-VOID RES,US,NON-IMAGING   Result Value Ref Range     PVR 36 cc             A copy of today's office visit with all pertinent imaging results and labs were sent to the referring MD Waldo Church MD        PNE Procedure:      Right: Jeanna N / Toe response Y: 1.2A, stimulation felt rectally  Left: Jeanna N / Toe response Y: 1.2A, stimulation felt rectally      The patient was prepped and draped in a sterile fashion. Time out occurred and all parties were in agreement. The coccyx was located and the finder needle was used to identify 9 cm from the coccyx. At this point, marks were made at 9, 11 and 13 cm. The 3.5 \" finder needle was then used to melissa these locations 2 cm from the midline on the patients right and left. Approximately 10 cc of 1% lidocaine with 8% Bicarb was used for local anesthesia.  At the 13 cm position, the finder needle was then inserted on the patients left at a 60 degree angle. The S3 foramen was located. The needle was then connected to the external pulse generator. The patient was able to feel the appropriate impulse and a brittany and great toe flexion occurred. The procedure was repeated on the patient's right. The temporary leads were then placed through the finder needle. The leads were then secured to the body and connected.     The patient tolerated the procedure well. Post procedure antibiotics were given and the patient will f/u in 7-10 days for removal of the temporary leads.    Schedule for complete InterStim implantation in 2 weeks

## 2018-05-08 ENCOUNTER — APPOINTMENT (OUTPATIENT)
Dept: GENERAL RADIOLOGY | Age: 68
End: 2018-05-08
Attending: UROLOGY
Payer: MEDICARE

## 2018-05-08 ENCOUNTER — HOSPITAL ENCOUNTER (OUTPATIENT)
Age: 68
Setting detail: OUTPATIENT SURGERY
Discharge: HOME | End: 2018-05-08
Attending: UROLOGY | Admitting: UROLOGY
Payer: MEDICARE

## 2018-05-08 ENCOUNTER — ANESTHESIA (OUTPATIENT)
Dept: SURGERY | Age: 68
End: 2018-05-08
Payer: MEDICARE

## 2018-05-08 VITALS
OXYGEN SATURATION: 99 % | HEIGHT: 67 IN | WEIGHT: 173.8 LBS | RESPIRATION RATE: 18 BRPM | TEMPERATURE: 97 F | DIASTOLIC BLOOD PRESSURE: 81 MMHG | SYSTOLIC BLOOD PRESSURE: 172 MMHG | BODY MASS INDEX: 27.28 KG/M2 | HEART RATE: 54 BPM

## 2018-05-08 PROBLEM — N32.81 OAB (OVERACTIVE BLADDER): Status: ACTIVE | Noted: 2018-05-08

## 2018-05-08 PROCEDURE — 74011250636 HC RX REV CODE- 250/636: Performed by: NURSE ANESTHETIST, CERTIFIED REGISTERED

## 2018-05-08 PROCEDURE — 77030019605: Performed by: UROLOGY

## 2018-05-08 PROCEDURE — 77030032490 HC SLV COMPR SCD KNE COVD -B: Performed by: UROLOGY

## 2018-05-08 PROCEDURE — 76010000149 HC OR TIME 1 TO 1.5 HR: Performed by: UROLOGY

## 2018-05-08 PROCEDURE — 77030010507 HC ADH SKN DERMBND J&J -B: Performed by: UROLOGY

## 2018-05-08 PROCEDURE — 76210000016 HC OR PH I REC 1 TO 1.5 HR: Performed by: UROLOGY

## 2018-05-08 PROCEDURE — 77030020268 HC MISC GENERAL SUPPLY: Performed by: UROLOGY

## 2018-05-08 PROCEDURE — C1767 GENERATOR, NEURO NON-RECHARG: HCPCS | Performed by: UROLOGY

## 2018-05-08 PROCEDURE — 74011000250 HC RX REV CODE- 250: Performed by: NURSE ANESTHETIST, CERTIFIED REGISTERED

## 2018-05-08 PROCEDURE — 74011250636 HC RX REV CODE- 250/636

## 2018-05-08 PROCEDURE — 77030011640 HC PAD GRND REM COVD -A: Performed by: UROLOGY

## 2018-05-08 PROCEDURE — C1778 LEAD, NEUROSTIMULATOR: HCPCS | Performed by: UROLOGY

## 2018-05-08 PROCEDURE — 76060000033 HC ANESTHESIA 1 TO 1.5 HR: Performed by: UROLOGY

## 2018-05-08 PROCEDURE — 74011000250 HC RX REV CODE- 250

## 2018-05-08 PROCEDURE — 77030031139 HC SUT VCRL2 J&J -A: Performed by: UROLOGY

## 2018-05-08 PROCEDURE — C1787 PATIENT PROGR, NEUROSTIM: HCPCS | Performed by: UROLOGY

## 2018-05-08 PROCEDURE — 77030002933 HC SUT MCRYL J&J -A: Performed by: UROLOGY

## 2018-05-08 PROCEDURE — 74011000250 HC RX REV CODE- 250: Performed by: UROLOGY

## 2018-05-08 PROCEDURE — 77030028990 HC ADH TISS DERMFLX CHMP -B: Performed by: UROLOGY

## 2018-05-08 PROCEDURE — C1894 INTRO/SHEATH, NON-LASER: HCPCS | Performed by: UROLOGY

## 2018-05-08 PROCEDURE — 76210000020 HC REC RM PH II FIRST 0.5 HR: Performed by: UROLOGY

## 2018-05-08 PROCEDURE — 72170 X-RAY EXAM OF PELVIS: CPT

## 2018-05-08 DEVICE — GENERATOR INTERSTIM X --: Type: IMPLANTABLE DEVICE | Site: BACK | Status: FUNCTIONAL

## 2018-05-08 DEVICE — LEAD KT STIM INTERSTIM 28CM --: Type: IMPLANTABLE DEVICE | Site: BACK | Status: FUNCTIONAL

## 2018-05-08 RX ORDER — GLYCOPYRROLATE 0.2 MG/ML
INJECTION INTRAMUSCULAR; INTRAVENOUS AS NEEDED
Status: DISCONTINUED | OUTPATIENT
Start: 2018-05-08 | End: 2018-05-08 | Stop reason: HOSPADM

## 2018-05-08 RX ORDER — PROPOFOL 10 MG/ML
INJECTION, EMULSION INTRAVENOUS
Status: DISCONTINUED | OUTPATIENT
Start: 2018-05-08 | End: 2018-05-08 | Stop reason: HOSPADM

## 2018-05-08 RX ORDER — LIDOCAINE HYDROCHLORIDE 20 MG/ML
INJECTION, SOLUTION EPIDURAL; INFILTRATION; INTRACAUDAL; PERINEURAL AS NEEDED
Status: DISCONTINUED | OUTPATIENT
Start: 2018-05-08 | End: 2018-05-08 | Stop reason: HOSPADM

## 2018-05-08 RX ORDER — DIPHENHYDRAMINE HYDROCHLORIDE 50 MG/ML
12.5 INJECTION, SOLUTION INTRAMUSCULAR; INTRAVENOUS
Status: DISCONTINUED | OUTPATIENT
Start: 2018-05-08 | End: 2018-05-08 | Stop reason: HOSPADM

## 2018-05-08 RX ORDER — SODIUM CHLORIDE, SODIUM LACTATE, POTASSIUM CHLORIDE, CALCIUM CHLORIDE 600; 310; 30; 20 MG/100ML; MG/100ML; MG/100ML; MG/100ML
50 INJECTION, SOLUTION INTRAVENOUS CONTINUOUS
Status: DISCONTINUED | OUTPATIENT
Start: 2018-05-08 | End: 2018-05-08 | Stop reason: HOSPADM

## 2018-05-08 RX ORDER — SODIUM CHLORIDE, SODIUM LACTATE, POTASSIUM CHLORIDE, CALCIUM CHLORIDE 600; 310; 30; 20 MG/100ML; MG/100ML; MG/100ML; MG/100ML
75 INJECTION, SOLUTION INTRAVENOUS CONTINUOUS
Status: DISCONTINUED | OUTPATIENT
Start: 2018-05-08 | End: 2018-05-08 | Stop reason: HOSPADM

## 2018-05-08 RX ORDER — FENTANYL CITRATE 50 UG/ML
INJECTION, SOLUTION INTRAMUSCULAR; INTRAVENOUS AS NEEDED
Status: DISCONTINUED | OUTPATIENT
Start: 2018-05-08 | End: 2018-05-08 | Stop reason: HOSPADM

## 2018-05-08 RX ORDER — CEFAZOLIN SODIUM 2 G/50ML
2 SOLUTION INTRAVENOUS ONCE
Status: DISCONTINUED | OUTPATIENT
Start: 2018-05-08 | End: 2018-05-08 | Stop reason: HOSPADM

## 2018-05-08 RX ORDER — OXYCODONE AND ACETAMINOPHEN 5; 325 MG/1; MG/1
1 TABLET ORAL
Qty: 20 TAB | Refills: 0 | Status: SHIPPED | OUTPATIENT
Start: 2018-05-08 | End: 2018-08-10 | Stop reason: ALTCHOICE

## 2018-05-08 RX ORDER — CEPHALEXIN 500 MG/1
500 CAPSULE ORAL 3 TIMES DAILY
Qty: 20 CAP | Refills: 0 | Status: SHIPPED | OUTPATIENT
Start: 2018-05-08 | End: 2018-08-10 | Stop reason: ALTCHOICE

## 2018-05-08 RX ORDER — SODIUM CHLORIDE 0.9 % (FLUSH) 0.9 %
5-10 SYRINGE (ML) INJECTION AS NEEDED
Status: DISCONTINUED | OUTPATIENT
Start: 2018-05-08 | End: 2018-05-08 | Stop reason: HOSPADM

## 2018-05-08 RX ORDER — PROPOFOL 10 MG/ML
INJECTION, EMULSION INTRAVENOUS AS NEEDED
Status: DISCONTINUED | OUTPATIENT
Start: 2018-05-08 | End: 2018-05-08 | Stop reason: HOSPADM

## 2018-05-08 RX ORDER — FENTANYL CITRATE 50 UG/ML
50 INJECTION, SOLUTION INTRAMUSCULAR; INTRAVENOUS
Status: DISCONTINUED | OUTPATIENT
Start: 2018-05-08 | End: 2018-05-08 | Stop reason: HOSPADM

## 2018-05-08 RX ORDER — ONDANSETRON 2 MG/ML
INJECTION INTRAMUSCULAR; INTRAVENOUS AS NEEDED
Status: DISCONTINUED | OUTPATIENT
Start: 2018-05-08 | End: 2018-05-08 | Stop reason: HOSPADM

## 2018-05-08 RX ORDER — ONDANSETRON 2 MG/ML
4 INJECTION INTRAMUSCULAR; INTRAVENOUS ONCE
Status: DISCONTINUED | OUTPATIENT
Start: 2018-05-08 | End: 2018-05-08 | Stop reason: HOSPADM

## 2018-05-08 RX ORDER — NALOXONE HYDROCHLORIDE 0.4 MG/ML
0.04 INJECTION, SOLUTION INTRAMUSCULAR; INTRAVENOUS; SUBCUTANEOUS AS NEEDED
Status: DISCONTINUED | OUTPATIENT
Start: 2018-05-08 | End: 2018-05-08 | Stop reason: HOSPADM

## 2018-05-08 RX ORDER — ALBUTEROL SULFATE 0.83 MG/ML
2.5 SOLUTION RESPIRATORY (INHALATION) AS NEEDED
Status: DISCONTINUED | OUTPATIENT
Start: 2018-05-08 | End: 2018-05-08 | Stop reason: HOSPADM

## 2018-05-08 RX ORDER — SODIUM CHLORIDE 0.9 % (FLUSH) 0.9 %
5-10 SYRINGE (ML) INJECTION EVERY 8 HOURS
Status: DISCONTINUED | OUTPATIENT
Start: 2018-05-08 | End: 2018-05-08 | Stop reason: HOSPADM

## 2018-05-08 RX ORDER — BUPIVACAINE HYDROCHLORIDE 5 MG/ML
INJECTION, SOLUTION EPIDURAL; INTRACAUDAL AS NEEDED
Status: DISCONTINUED | OUTPATIENT
Start: 2018-05-08 | End: 2018-05-08 | Stop reason: HOSPADM

## 2018-05-08 RX ORDER — MIDAZOLAM HYDROCHLORIDE 1 MG/ML
INJECTION, SOLUTION INTRAMUSCULAR; INTRAVENOUS AS NEEDED
Status: DISCONTINUED | OUTPATIENT
Start: 2018-05-08 | End: 2018-05-08 | Stop reason: HOSPADM

## 2018-05-08 RX ADMIN — FENTANYL CITRATE 100 MCG: 50 INJECTION, SOLUTION INTRAMUSCULAR; INTRAVENOUS at 13:30

## 2018-05-08 RX ADMIN — SODIUM CHLORIDE, SODIUM LACTATE, POTASSIUM CHLORIDE, AND CALCIUM CHLORIDE 75 ML/HR: 600; 310; 30; 20 INJECTION, SOLUTION INTRAVENOUS at 11:26

## 2018-05-08 RX ADMIN — LIDOCAINE HYDROCHLORIDE 60 MG: 20 INJECTION, SOLUTION EPIDURAL; INFILTRATION; INTRACAUDAL; PERINEURAL at 13:46

## 2018-05-08 RX ADMIN — ONDANSETRON 4 MG: 2 INJECTION INTRAMUSCULAR; INTRAVENOUS at 13:30

## 2018-05-08 RX ADMIN — PROPOFOL 50 MCG/KG/MIN: 10 INJECTION, EMULSION INTRAVENOUS at 13:47

## 2018-05-08 RX ADMIN — SODIUM CHLORIDE 20 MG: 9 INJECTION INTRAMUSCULAR; INTRAVENOUS; SUBCUTANEOUS at 11:26

## 2018-05-08 RX ADMIN — GLYCOPYRROLATE 0.2 MG: 0.2 INJECTION INTRAMUSCULAR; INTRAVENOUS at 13:30

## 2018-05-08 RX ADMIN — FENTANYL CITRATE 50 MCG: 50 INJECTION, SOLUTION INTRAMUSCULAR; INTRAVENOUS at 14:26

## 2018-05-08 RX ADMIN — FENTANYL CITRATE 50 MCG: 50 INJECTION, SOLUTION INTRAMUSCULAR; INTRAVENOUS at 14:04

## 2018-05-08 RX ADMIN — MIDAZOLAM HYDROCHLORIDE 2 MG: 1 INJECTION, SOLUTION INTRAMUSCULAR; INTRAVENOUS at 13:30

## 2018-05-08 RX ADMIN — FENTANYL CITRATE 50 MCG: 50 INJECTION, SOLUTION INTRAMUSCULAR; INTRAVENOUS at 13:54

## 2018-05-08 RX ADMIN — PROPOFOL 30 MG: 10 INJECTION, EMULSION INTRAVENOUS at 13:46

## 2018-05-08 RX ADMIN — SODIUM CHLORIDE, SODIUM LACTATE, POTASSIUM CHLORIDE, AND CALCIUM CHLORIDE: 600; 310; 30; 20 INJECTION, SOLUTION INTRAVENOUS at 13:30

## 2018-05-08 NOTE — DISCHARGE INSTRUCTIONS
Dr Niranjan James in 1 month. May shower in 48hrs  No driving or use of machinery while on narcotics. Avoid constipation - use stool softners as needed. No strenuous activities. ,  Avoid bending at hips or reaching above head in next 2 weeks. Avoid blood thinners for 1 week. (Baclofen, motrin etc...)  No heavy lifting < 10lbs   Keep wound incision clean and dry    DISCHARGE SUMMARY from Nurse    PATIENT INSTRUCTIONS:    After general anesthesia or intravenous sedation, for 24 hours or while taking prescription Narcotics:  · Limit your activities  · Do not drive and operate hazardous machinery  · Do not make important personal or business decisions  · Do  not drink alcoholic beverages  · If you have not urinated within 8 hours after discharge, please contact your surgeon on call. Report the following to your surgeon:  · Excessive pain, swelling, redness or odor of or around the surgical area  · Temperature over 100.5  · Nausea and vomiting lasting longer than 4 hours or if unable to take medications  · Any signs of decreased circulation or nerve impairment to extremity: change in color, persistent  numbness, tingling, coldness or increase pain  · Any questions    *  Please give a list of your current medications to your Primary Care Provider. *  Please update this list whenever your medications are discontinued, doses are      changed, or new medications (including over-the-counter products) are added. *  Please carry medication information at all times in case of emergency situations. These are general instructions for a healthy lifestyle:    No smoking/ No tobacco products/ Avoid exposure to second hand smoke  Surgeon General's Warning:  Quitting smoking now greatly reduces serious risk to your health.     Obesity, smoking, and sedentary lifestyle greatly increases your risk for illness    A healthy diet, regular physical exercise & weight monitoring are important for maintaining a healthy lifestyle    You may be retaining fluid if you have a history of heart failure or if you experience any of the following symptoms:  Weight gain of 3 pounds or more overnight or 5 pounds in a week, increased swelling in our hands or feet or shortness of breath while lying flat in bed. Please call your doctor as soon as you notice any of these symptoms; do not wait until your next office visit. Recognize signs and symptoms of STROKE:    F-face looks uneven    A-arms unable to move or move unevenly    S-speech slurred or non-existent    T-time-call 911 as soon as signs and symptoms begin-DO NOT go       Back to bed or wait to see if you get better-TIME IS BRAIN. Warning Signs of HEART ATTACK     Call 911 if you have these symptoms:   Chest discomfort. Most heart attacks involve discomfort in the center of the chest that lasts more than a few minutes, or that goes away and comes back. It can feel like uncomfortable pressure, squeezing, fullness, or pain.  Discomfort in other areas of the upper body. Symptoms can include pain or discomfort in one or both arms, the back, neck, jaw, or stomach.  Shortness of breath with or without chest discomfort.  Other signs may include breaking out in a cold sweat, nausea, or lightheadedness. Don't wait more than five minutes to call 911 - MINUTES MATTER! Fast action can save your life. Calling 911 is almost always the fastest way to get lifesaving treatment. Emergency Medical Services staff can begin treatment when they arrive -- up to an hour sooner than if someone gets to the hospital by car. The discharge information has been reviewed with the patient and parent. The patient and parent verbalized understanding.   Discharge medications reviewed with the patient and parent and appropriate educational materials and side effects teaching were provided. ___________________________________________________________________________________________________________________________________   Cephalexin (Bio-Cef, Keflex) - (By mouth)   Why this medicine is used:   Treats infections. Contact a nurse or doctor right away if you have:  · Blistering, peeling, or red skin rash  · Severe or bloody diarrhea     Common side effects:  · Mild diarrhea or nausea  © 2017 [address] is for End User's use only and may not be sold, redistributed or otherwise used for commercial purposes. Narcotic-Analgesic/Acetaminophen (Percocet, Norco, Lorcet HD, Lortab 10/325) - (By mouth)   Why this medicine is used:   Relieves pain. Contact a nurse or doctor right away if you have:  · Extreme weakness, shallow breathing, slow heartbeat  · Severe confusion, lightheadedness, dizziness, fainting  · Yellow skin or eyes, dark urine or pale stools  · Severe constipation, severe stomach pain, nausea, vomiting, loss of appetite  · Sweating or cold, clammy skin     Common side effects:  · Mild constipation, nausea, vomiting  · Sleepiness, tiredness  · Itching, rash  © 2017 [address] Information is for End User's use only and may not be sold, redistributed or otherwise used for commercial purposes.

## 2018-05-08 NOTE — PERIOP NOTES
Patient armband removed and shredded  Patient confirmed by two identifiers with discharge instructions prior to being provided to patient and spouse and mother.

## 2018-05-08 NOTE — INTERVAL H&P NOTE
H&P Update:  Prudence Flow was seen and examined. History and physical has been reviewed. The patient has been examined.  There have been no significant clinical changes since the completion of the originally dated History and Physical.    Signed By: Leatha Snider MD     May 8, 2018 1:01 PM

## 2018-05-08 NOTE — IP AVS SNAPSHOT
[address] [place] [id number] 
[phone] Patient: Kalee Mejia MRN: [id number] About your hospitalization You were admitted on: May 8, 2018 You last received care in the:  [facility] PACU You were discharged on: May 8, 2018 Why you were hospitalized Your primary diagnosis was:  Not on File Your diagnoses also included:  Oab (Overactive Bladder) Follow-up Information Follow up With Details Comments Contact Info Casimiro Khan MD   [address] [facility] [address] [place] [address] 
[phone] Ousmane Valiente MD   [address] [facility] [address] [place] [address] 
[phone] Discharge Orders None A check melissa indicates which time of day the medication should be taken. My Medications START taking these medications Instructions Each Dose to Equal  
 Morning Noon Evening Bedtime  
 cephALEXin 500 mg capsule Commonly known as:  Lyndia Rad Your last dose was: Your next dose is: Take 1 Cap by mouth three (3) times daily. 500 mg  
    
   
   
   
  
 oxyCODONE-acetaminophen 5-325 mg per tablet Commonly known as:  PERCOCET Your last dose was: Your next dose is: Take 1 Tab by mouth every four (4) hours as needed for Pain. Max Daily Amount: 6 Tabs. 1 Tab CONTINUE taking these medications Instructions Each Dose to Equal  
 Morning Noon Evening Bedtime  
 amLODIPine 5 mg tablet Commonly known as:  Betsy Chimes Your last dose was: Your next dose is: TAKE 1 TABLET EVERY DAY  
     
   
   
   
  
 baclofen 10 mg tablet Commonly known as:  LIORESAL Your last dose was: Your next dose is: Take 1 Tab by mouth three (3) times daily. 10 mg  
    
   
   
   
  
 cholecalciferol (vitamin D3) 2,000 unit Tab Your last dose was: Your next dose is: Take 2,000 Units by mouth daily. 2000 Units  
    
   
   
   
  
 levothyroxine 112 mcg tablet Commonly known as:  SYNTHROID Your last dose was: Your next dose is: TAKE 1 TABLET DAILY BEFORE BREAKFAST  
     
   
   
   
  
 pravastatin 40 mg tablet Commonly known as:  PRAVACHOL Your last dose was: Your next dose is: TAKE 1 TABLET NIGHTLY  
     
   
   
   
  
 predniSONE 20 mg tablet Commonly known as:  Aelx Sells Your last dose was: Your next dose is: Take 3 tabs by mouth daily x 3 days, then 2 tabs daily x 3 days, then 1 tab daily x 3 days. sertraline 100 mg tablet Commonly known as:  ZOLOFT Your last dose was: Your next dose is: TAKE 1 TABLET EVERY DAY  
     
   
   
   
  
 vitamin e 1,000 unit capsule Commonly known as:  E GEMS Your last dose was: Your next dose is: Take 1,000 Units by mouth daily. 1000 Units ZyrTEC 10 mg tablet Generic drug:  cetirizine Your last dose was: Your next dose is: Take  by mouth daily. Where to Get Your Medications Information on where to get these meds will be given to you by the nurse or doctor. ! Ask your nurse or doctor about these medications  
  cephALEXin 500 mg capsule  
 oxyCODONE-acetaminophen 5-325 mg per tablet Opioid Education Prescription Opioids: What You Need to Know: 
 
 
 
F-face looks uneven A-arms unable to move or move unevenly S-speech slurred or non-existent T-time-call 911 as soon as signs and symptoms begin-DO NOT go Back to bed or wait to see if you get better-TIME IS BRAIN. Warning Signs of HEART ATTACK Call 911 if you have these symptoms: 
? Chest discomfort. Most heart attacks involve discomfort in the center of the chest that lasts more than a few minutes, or that goes away and comes back. It can feel like uncomfortable pressure, squeezing, fullness, or pain. ? Discomfort in other areas of the upper body. Symptoms can include pain or discomfort in one or both arms, the back, neck, jaw, or stomach. ? Shortness of breath with or without chest discomfort. ? Other signs may include breaking out in a cold sweat, nausea, or lightheadedness. Don't wait more than five minutes to call [address]! Fast action can save your life. Calling 911 is almost always the fastest way to get lifesaving treatment. Emergency Medical Services staff can begin treatment when they arrive  up to an hour sooner than if someone gets to the hospital by car. The discharge information has been reviewed with the patient and parent. The patient and parent verbalized understanding. Discharge medications reviewed with the patient and parent and appropriate educational materials and side effects teaching were provided. ___________________________________________________________________________________________________________________________________ Cephalexin (Bio-Cef, Keflex) - (By mouth) Why this medicine is used:  
Treats infections. Contact a nurse or doctor right away if you have: · Blistering, peeling, or red skin rash · Severe or bloody diarrhea Common side effects: · Mild diarrhea or nausea © 2017 [address]  Information is for End User's use only and may not be sold, redistributed or otherwise used for commercial purposes. Narcotic-Analgesic/Acetaminophen (Percocet, Norco, Lorcet HD, Lortab 10/325) - (By mouth) Why this medicine is used:  
Relieves pain. Contact a nurse or doctor right away if you have: 
· Extreme weakness, shallow breathing, slow heartbeat · Severe confusion, lightheadedness, dizziness, fainting · Yellow skin or eyes, dark urine or pale stools · Severe constipation, severe stomach pain, nausea, vomiting, loss of appetite · Sweating or cold, clammy skin Common side effects: · Mild constipation, nausea, vomiting · Sleepiness, tiredness · Itching, rash © 2017 [address]  Information is for End User's use only and may not be sold, redistributed or otherwise used for commercial purposes. Introducing [facility]! Dear Berta Avalos: 
Thank you for requesting a [employer] account. Our records indicate that you already have an active [employer] account. You can access your account anytime at [email or web address] Did you know that you can access your hospital and ER discharge instructions at any time in [employer]? You can also review all of your test results from your hospital stay or ER visit. Additional Information If you have questions, please visit the Frequently Asked Questions section of the [employer] website at [email or web address] Remember, [employer] is NOT to be used for urgent needs. For medical emergencies, dial 911. Now available from your iPhone and Android! Introducing Omari Luciano As a [facility] patient, I wanted to make you aware of our electronic visit tool called Omari Luciano. [employer] allows you to connect within minutes with a medical provider 24 hours a day, seven days a week via a mobile device or tablet or logging into a secure website from your computer. You can access [employer] from anywhere in the [place]. A virtual visit might be right for you when you have a simple condition and feel like you just dont want to get out of bed, or cant get away from work for an appointment, when your regular [employer] provider is not available (evenings, weekends or holidays), or when youre out of town and need minor care. Electronic visits cost only $49 and if the [employer] provider determines a prescription is needed to treat your condition, one can be electronically transmitted to a nearby pharmacy*. Please take a moment to enroll today if you have not already done so. The enrollment process is free and takes just a few minutes. To enroll, please download the [employer] kerrie to your tablet or phone, or visit [email or web address] to enroll on your computer. And, as an [address] [place] [address] patient with a [employer] account, the results of your visits will be scanned into your electronic medical record and your primary care provider will be able to view the scanned results. We urge you to continue to see your regular [employer] provider for your ongoing medical care. And while your primary care provider may not be the one available when you seek a [employer] virtual visit, the peace of mind you get from getting a real diagnosis real time can be priceless. For more information on [employer], view our Frequently Asked Questions (FAQs) at [email or web address]. Sincerely, 
 
Karyn Rogers MD 
Chief Medical Officer [address] *:  certain medications cannot be prescribed via [employer] Unresulted Labs-Please follow up with your PCP about these lab tests Order Current Status [place] XR TECHNOLOGIST SERVICE In process XR PELV 1 OR 2 V In process Providers Seen During Your Hospitalization Provider Specialty Primary office phone Waldo Medrano MD Urology [phone] Your Primary Care Physician (PCP) Primary Care Physician Office Phone Office Fax Moe Donovan [phone] You are allergic to the following No active allergies Recent Documentation Height Weight BMI OB Status Smoking Status 1.702 m 78.8 kg 27.22 kg/m2 Hysterectomy Never Smoker Emergency Contacts Name Discharge Info Relation Home Work Mobile Pino CAREGIVER [3] Spouse [3] [phone] Crystal Roberto DISCHARGE CAREGIVER [3] Mother [14] [phone] Patient Belongings The following personal items are in your possession at time of discharge: 
  Dental Appliances: None  Visual Aid: Glasses      Home Medications: None   Jewelry: None  Clothing: Shirt, Pants, Footwear, Socks, Undergarments    Other Valuables: None Please provide this summary of care documentation to your next provider. Signatures-by signing, you are acknowledging that this After Visit Summary has been reviewed with you and you have received a copy. Patient Signature:  ____________________________________________________________ Date:  ____________________________________________________________  
  
[facility] Provider Signature:  ____________________________________________________________ Date:  ____________________________________________________________

## 2018-05-08 NOTE — BRIEF OP NOTE
BRIEF OPERATIVE NOTE    Surgical Staff:  Circ-1: Kelli Payne RN  Radiology Technician: Clotilde Valderrama  Scrub Tech-1: Leydi Christie  Surg Asst-1: Molly Dyer  Event Time In   Incision Start  2:00 PM   Incision Close  2:35 PM     Anesthesia: MAC   Estimated Blood Loss: Minimal  Specimens: * No specimens in log *     Implants:   Implant Name Type Inv. Item Serial No.  Lot No. LRB No. Used Action   LEAD KT STIM INTERSTIM 28CM --  - [id number]  LEAD KT STIM INTERSTIM 28CM --   [employer] [id number] N/A 1 Implanted   GENERATOR INTERSTIM II IPG --  - [id number]   GENERATOR INTERSTIM II IPG --    [facility] [id number] N/A 1 Implanted         LOCATION OF PATIENT: [address] 60 OF OPERATION:  5/8/2018     SURGEON:  Anson London MD    PREOPERATIVE DIAGNOSIS:  1. Refractory urge incontinence, Failed Medical therapy  2. Overactive Bladder   3. >50% improvement in PNE trial      POSTOPERATIVE DIAGNOSIS:  1. Refractory urge incontinence, Failed Medical therapy  2. Overactive Bladder   3. >50% improvement in PNE trial    PROCEDURE:  1. First and second stage InterStim placement of Right tined lead   2. Implantation of pulse Generator on Right side  3. Impedance check    ANESTHESIA:Sedation. TUBES AND DRAINS:None. FINDINGS:   Motor response     Jeanna at  <1Amp    Toes response at < 1Amp  IMPLANTS:  Tined lead and InterStim pulse generator. ESTIMATED BLOOD LOSS:  Minimal.    DESCRIPTION OF OPERATION:  The patient is a very pleasant  77 y.o. female with a long history of  refractory urge incontinence. She underwent a PNE trial and had  greater than 50% response. Therefore she presents today for  insertion of a tined lead in the S3 foramen and placement of a pulse  generator. Informed consent was obtained. Preoperative antibiotics were given. The  patient was brought to the operating theater and anesthetized.  She was placed  in the prone position and prepped and draped in a sterile fashion. Time-out  occurred and all parties were in agreement. A marking pen was used to identify  The medial edge of the S3 foramen and S3 hillock with the aide of fluoroscopy. A local anesthetic using mixture of 1% lidocaine and 0.25% marcaine was instilled   At this marked site, the foramen needle was then placed in this location through the Right sacral S3 foramen. We determined with aide of  fluoroscopy  that the needle was in the correct space. Stimulation  of the needle occurred and there was a strong brittany response at a low  amplitude and good first toe response. A directional guide was placed through the needle. An incision was made next to the directional guide. A dilator was used and the  dilator was confirmed under fluoroscopy. A tined lead was then placed through  the dilator and tested at each of the sites. Therefore , the placement  was then confirmed by fluoroscopy. A tunneler was placed  from the lead position into the Right side lateral pocket. This was approximately 4 cm  inferior to the  iliac crest and 2 cm lateral to edge of sacrum in the fatty tissue of the buttocks. It was injected with approximately 5 cc of 0.25% Marcaine without epinephrine. An  incision was made and the pocket was then dissected using sharp and blunt  dissection. Cautery was used to cauterize any bleeding. The lead was then  connected to the pulse generator and the pulse generator was placed in the  pocket. The pulse generator was then interrogated by performing an impedance check and all connections were secure. The pocket was then closed with interrupted 3-0 Vicryl suture and the  skin was then closed with subcuticular 4-0 vicryl suture. The S3 foraminal  incision was then closed with interrupted 3-0 Vicryl suture. The patient  tolerated the procedure well. Dermabond was applied to the incisions.   The patient was then awakened and taken to the recovery room in  good condition.       Wil Gamble MD  Pager: [phone]

## 2018-05-09 ENCOUNTER — PATIENT OUTREACH (OUTPATIENT)
Dept: INTERNAL MEDICINE CLINIC | Age: 68
End: 2018-05-09

## 2018-05-09 NOTE — PROGRESS NOTES
Hospital Discharge Follow-Up      Date/Time:  2018 10:48 AM    [facility] and discharged on 2018 for scheduled first and second stage InterStim placement of right tined lead. The physician discharge summary was not available at the time of outreach. Patient was contacted within 2 business days of discharge. Nurse Navigator (NN) contacted the patient by telephone to perform post hospital discharge assessment. Verified name and  with patient as identifiers. Provided introduction to self, and explanation of the Nurse Navigator role. Patient reports:  Prescriptions picked and available  Started Keflex today  Decrease in incontinence  Denies pain, described as soreness 3/10 on a scale of 0 to 10  Will make 2 month follow up with Urology    Patient denies:  SOB  Fever  Chills  Nausea  Vomiting  Dizziness  Lightheadedness  Numbness  Tingling  Bleeding   Pain     Reviewed discharge instructions and red flags with patient who verbalized understanding. Patient given an opportunity to ask questions and does not have any further questions or concerns at this time. The patient agrees to contact the PCP office for questions related to their healthcare. NN provided contact information for future reference. Barriers to care? None at this tiime    Advance Care Planning:   Does patient have an Advance Directive:  not on file     Medication:     New Medications at Discharge: Keflex and Percocet  Changed Medications at Discharge: None  Discontinued Medications at Discharge: None    Current Outpatient Prescriptions   Medication Sig    oxyCODONE-acetaminophen (PERCOCET) 5-325 mg per tablet Take 1 Tab by mouth every four (4) hours as needed for Pain. Max Daily Amount: 6 Tabs.  cephALEXin (KEFLEX) 500 mg capsule Take 1 Cap by mouth three (3) times daily.  cholecalciferol, vitamin D3, 2,000 unit tab Take 2,000 Units by mouth daily.     amLODIPine (NORVASC) 5 mg tablet TAKE 1 TABLET EVERY DAY    sertraline (ZOLOFT) 100 mg tablet TAKE 1 TABLET EVERY DAY    pravastatin (PRAVACHOL) 40 mg tablet TAKE 1 TABLET NIGHTLY    levothyroxine (SYNTHROID) 112 mcg tablet TAKE 1 TABLET DAILY BEFORE BREAKFAST    predniSONE (DELTASONE) 20 mg tablet Take 3 tabs by mouth daily x 3 days, then 2 tabs daily x 3 days, then 1 tab daily x 3 days.  baclofen (LIORESAL) 10 mg tablet Take 1 Tab by mouth three (3) times daily.  vitamin e (E GEMS) 1,000 unit capsule Take 1,000 Units by mouth daily.  cetirizine (ZYRTEC) 10 mg tablet Take  by mouth daily. No current facility-administered medications for this visit. There are no discontinued medications. PCP/Specialist follow up: Future Appointments  Date Time Provider Andrew De Guzman   8/3/2018 9:05 AM [facility] NURSE VISIT [address]   8/10/2018 10:30 AM Jenniffer Nayak MD [address]          Goals        Patient Stated     Decrease Incontinence (pt-stated)             Decrease in frequency of incontinence         Other     Prevent complications post hospitalization.             No admissions within 30 days post discharge, 5/8/2018

## 2018-05-09 NOTE — ANESTHESIA POSTPROCEDURE EVALUATION
Post-Anesthesia Evaluation and Assessment    Patient: John Chow MRN: [id number]  SSN: [id number]    YOB: 1950  Age: 77 y.o. Sex: female     VS from flow sheet    Cardiovascular Function/Vital Signs  Visit Vitals    /81 (BP 1 Location: Left arm, BP Patient Position: At rest)    Pulse (!) 54    Temp 36.1 °C (97 °F)    Resp 18    Ht 5' 7\" (1.702 m)    Wt 78.8 kg (173 lb 12.8 oz)    SpO2 99%    BMI 27.22 kg/m2       Patient is status post MAC anesthesia for Procedure(s):  COMPLETE INTERSTIM IMPLANTATION/C-ARM/[employer]ONICS. Nausea/Vomiting: None    Postoperative hydration reviewed and adequate. Pain:  Pain Scale 1: Numeric (0 - 10) (05/08/18 1608)  Pain Intensity 1: 0 (05/08/18 1608)   Managed    Neurological Status:   Neuro (WDL): Within Defined Limits (05/08/18 1448)  Neuro  LUE Motor Response: Purposeful (05/08/18 1448)  LLE Motor Response: Purposeful (05/08/18 1448)  RUE Motor Response: Purposeful (05/08/18 1448)  RLE Motor Response: Purposeful (05/08/18 1448)   At baseline    Mental Status and Level of Consciousness: Arousable    Pulmonary Status:   O2 Device: Room air (05/08/18 1608)   Adequate oxygenation and airway patent    Complications related to anesthesia: None    Post-anesthesia assessment completed.  No concerns    Signed By: Maki Herzog MD     May 8, 2018

## 2018-05-22 ENCOUNTER — TELEPHONE (OUTPATIENT)
Dept: INTERNAL MEDICINE CLINIC | Age: 68
End: 2018-05-22

## 2018-05-22 RX ORDER — PREDNISONE 20 MG/1
40 TABLET ORAL DAILY
Qty: 14 TAB | Refills: 0 | Status: SHIPPED | OUTPATIENT
Start: 2018-05-22 | End: 2018-08-10 | Stop reason: ALTCHOICE

## 2018-05-22 NOTE — TELEPHONE ENCOUNTER
Seen about 6 months ago for hives and itching- it has started all over again for about a week-please advise

## 2018-06-08 ENCOUNTER — PATIENT OUTREACH (OUTPATIENT)
Dept: INTERNAL MEDICINE CLINIC | Age: 68
End: 2018-06-08

## 2018-06-08 NOTE — PROGRESS NOTES
Goals Addressed             Most Recent       Post Hospitalization     COMPLETED: Prevent complications post hospitalization. On track (6/8/2018)             No admissions within 30 days post discharge, 5/8/2018  Episode closed: no hospitalization or ED admission post 30 days from discharge.

## 2018-06-18 ENCOUNTER — TELEPHONE (OUTPATIENT)
Dept: INTERNAL MEDICINE CLINIC | Age: 68
End: 2018-06-18

## 2018-06-18 DIAGNOSIS — F41.9 ANXIETY: Primary | ICD-10-CM

## 2018-06-18 RX ORDER — LORAZEPAM 0.5 MG/1
0.5 TABLET ORAL
Qty: 60 TAB | Refills: 0 | Status: SHIPPED | OUTPATIENT
Start: 2018-06-18 | End: 2019-12-24 | Stop reason: ALTCHOICE

## 2018-06-18 NOTE — TELEPHONE ENCOUNTER
Pt is having anxiety attack- she is downsizing her home and a lot going on- takes Zoloft- is there anything else she can take- very stressed

## 2018-07-05 ENCOUNTER — TELEPHONE (OUTPATIENT)
Dept: INTERNAL MEDICINE CLINIC | Age: 68
End: 2018-07-05

## 2018-07-05 NOTE — TELEPHONE ENCOUNTER
PA for ativan came back stated patient did not need one done patient should be able to  meds if she has not already

## 2018-08-01 DIAGNOSIS — E03.9 ACQUIRED HYPOTHYROIDISM: ICD-10-CM

## 2018-08-01 DIAGNOSIS — I10 ESSENTIAL HYPERTENSION WITH GOAL BLOOD PRESSURE LESS THAN 140/90: ICD-10-CM

## 2018-08-01 DIAGNOSIS — E78.5 HYPERLIPIDEMIA LDL GOAL <130: ICD-10-CM

## 2018-08-03 ENCOUNTER — HOSPITAL ENCOUNTER (OUTPATIENT)
Dept: LAB | Age: 68
Discharge: HOME | End: 2018-08-03

## 2018-08-03 PROCEDURE — 99001 SPECIMEN HANDLING PT-LAB: CPT | Performed by: INTERNAL MEDICINE

## 2018-08-04 LAB
ALBUMIN SERPL-MCNC: 3.9 G/DL (ref 3.5–4.8)
ALBUMIN/GLOB SERPL: 1.5 {RATIO} (ref 1.2–2.2)
ALP SERPL-CCNC: 80 IU/L (ref 39–117)
ALT SERPL-CCNC: 8 IU/L (ref 0–32)
AST SERPL-CCNC: 15 IU/L (ref 0–40)
BASOPHILS # BLD AUTO: 0.1 X10E3/UL (ref 0–0.2)
BASOPHILS NFR BLD AUTO: 1 %
BILIRUB SERPL-MCNC: 0.5 MG/DL (ref 0–1.2)
BUN SERPL-MCNC: 13 MG/DL (ref 8–27)
BUN/CREAT SERPL: 18 (ref 12–28)
CALCIUM SERPL-MCNC: 9.1 MG/DL (ref 8.7–10.3)
CHLORIDE SERPL-SCNC: 103 MMOL/L (ref 96–106)
CHOLEST SERPL-MCNC: 204 MG/DL (ref 100–199)
CO2 SERPL-SCNC: 27 MMOL/L (ref 20–29)
CREAT SERPL-MCNC: 0.74 MG/DL (ref 0.57–1)
EOSINOPHIL # BLD AUTO: 0.3 X10E3/UL (ref 0–0.4)
EOSINOPHIL NFR BLD AUTO: 6 %
ERYTHROCYTE [DISTWIDTH] IN BLOOD BY AUTOMATED COUNT: 13.2 % (ref 12.3–15.4)
GLOBULIN SER CALC-MCNC: 2.6 G/DL (ref 1.5–4.5)
GLUCOSE SERPL-MCNC: 93 MG/DL (ref 65–99)
HCT VFR BLD AUTO: 41.3 % (ref 34–46.6)
HDLC SERPL-MCNC: 45 MG/DL
HGB BLD-MCNC: 12.9 G/DL (ref 11.1–15.9)
IMM GRANULOCYTES # BLD: 0 X10E3/UL (ref 0–0.1)
IMM GRANULOCYTES NFR BLD: 0 %
INTERPRETATION, 910389: NORMAL
LDLC SERPL CALC-MCNC: 137 MG/DL (ref 0–99)
LYMPHOCYTES # BLD AUTO: 1.6 X10E3/UL (ref 0.7–3.1)
LYMPHOCYTES NFR BLD AUTO: 31 %
MCH RBC QN AUTO: 27.3 PG (ref 26.6–33)
MCHC RBC AUTO-ENTMCNC: 31.2 G/DL (ref 31.5–35.7)
MCV RBC AUTO: 87 FL (ref 79–97)
MONOCYTES # BLD AUTO: 0.8 X10E3/UL (ref 0.1–0.9)
MONOCYTES NFR BLD AUTO: 16 %
NEUTROPHILS # BLD AUTO: 2.3 X10E3/UL (ref 1.4–7)
NEUTROPHILS NFR BLD AUTO: 46 %
PLATELET # BLD AUTO: 369 X10E3/UL (ref 150–379)
POTASSIUM SERPL-SCNC: 4.4 MMOL/L (ref 3.5–5.2)
PROT SERPL-MCNC: 6.5 G/DL (ref 6–8.5)
RBC # BLD AUTO: 4.73 X10E6/UL (ref 3.77–5.28)
SODIUM SERPL-SCNC: 142 MMOL/L (ref 134–144)
T4 FREE SERPL-MCNC: 1.15 NG/DL (ref 0.82–1.77)
TRIGL SERPL-MCNC: 110 MG/DL (ref 0–149)
TSH SERPL DL<=0.005 MIU/L-ACNC: 3.83 UIU/ML (ref 0.45–4.5)
VIT B12 SERPL-MCNC: 441 PG/ML (ref 232–1245)
VLDLC SERPL CALC-MCNC: 22 MG/DL (ref 5–40)
WBC # BLD AUTO: 5 X10E3/UL (ref 3.4–10.8)

## 2018-08-10 ENCOUNTER — OFFICE VISIT (OUTPATIENT)
Dept: INTERNAL MEDICINE CLINIC | Age: 68
End: 2018-08-10

## 2018-08-10 VITALS
RESPIRATION RATE: 14 BRPM | WEIGHT: 174.2 LBS | OXYGEN SATURATION: 96 % | TEMPERATURE: 98.6 F | SYSTOLIC BLOOD PRESSURE: 122 MMHG | DIASTOLIC BLOOD PRESSURE: 76 MMHG | BODY MASS INDEX: 27.34 KG/M2 | HEART RATE: 73 BPM | HEIGHT: 67 IN

## 2018-08-10 DIAGNOSIS — M81.0 POSTMENOPAUSAL OSTEOPOROSIS: ICD-10-CM

## 2018-08-10 DIAGNOSIS — Z23 ENCOUNTER FOR IMMUNIZATION: ICD-10-CM

## 2018-08-10 DIAGNOSIS — Z12.31 SCREENING MAMMOGRAM, ENCOUNTER FOR: ICD-10-CM

## 2018-08-10 DIAGNOSIS — R21 RASH: ICD-10-CM

## 2018-08-10 DIAGNOSIS — E03.9 ACQUIRED HYPOTHYROIDISM: ICD-10-CM

## 2018-08-10 DIAGNOSIS — F32.5 MAJOR DEPRESSION IN COMPLETE REMISSION (HCC): ICD-10-CM

## 2018-08-10 DIAGNOSIS — I10 ESSENTIAL HYPERTENSION WITH GOAL BLOOD PRESSURE LESS THAN 140/90: Primary | ICD-10-CM

## 2018-08-10 DIAGNOSIS — N39.46 MIXED INCONTINENCE: ICD-10-CM

## 2018-08-10 DIAGNOSIS — E55.9 HYPOVITAMINOSIS D: ICD-10-CM

## 2018-08-10 DIAGNOSIS — E78.5 HYPERLIPIDEMIA LDL GOAL <130: ICD-10-CM

## 2018-08-10 NOTE — PROGRESS NOTES
Ellen Ferreira 1950, is a 77 y.o. female, who is seen today for routine physical exam as well as reevaluation of hypertension hyperlipidemia hypothyroidism anxiety overweight urinary incontinence and complaining of an itchy rash as well. She has had an itchy rash the last few months and has been treated with prednisone twice by my associate but it keeps coming back. It goes away and the itchiness goes away with prednisone. She also still has incontinence though she is somewhat better after Botox treatments and now she has a stimulator in place. She takes all her medicine correctly. She has no chest pain or dyspnea or other new symptoms. Past Medical History:   Diagnosis Date    Allergic rhinitis     Atrophic vaginitis     Back pain     Cystitis     Epigastric pain     Headache(784.0)     migraine    Hypercholesterolemia     Hypertension     Neck pain     Numbness of arm 12/18/06    left    LOREN (obstructive sleep apnea)     no cpap    Phlebitis of saphenous vein 4/27/07    Left    Thyroid disease     hypothyroidism    Urinary incontinence      Past Surgical History:   Procedure Laterality Date    HX CERVICAL DISKECTOMY  6/7/2016    HX COLONOSCOPY  approximately 2013    HX HYSTERECTOMY  approximately 1985    HX LUMBAR DISKECTOMY  2016    HX ORTHOPAEDIC  2006    right knee surgery     Current Outpatient Prescriptions   Medication Sig Dispense Refill    LORazepam (ATIVAN) 0.5 mg tablet Take 1 Tab by mouth every four (4) hours as needed for Anxiety. Max Daily Amount: 3 mg. 60 Tab 0    cholecalciferol, vitamin D3, 2,000 unit tab Take 2,000 Units by mouth daily.       amLODIPine (NORVASC) 5 mg tablet TAKE 1 TABLET EVERY DAY 90 Tab 3    sertraline (ZOLOFT) 100 mg tablet TAKE 1 TABLET EVERY DAY 90 Tab 3    pravastatin (PRAVACHOL) 40 mg tablet TAKE 1 TABLET NIGHTLY 90 Tab 3    levothyroxine (SYNTHROID) 112 mcg tablet TAKE 1 TABLET DAILY BEFORE BREAKFAST 90 Tab 3    baclofen (LIORESAL) 10 mg tablet Take 1 Tab by mouth three (3) times daily. 270 Tab 1    vitamin e (E GEMS) 1,000 unit capsule Take 1,000 Units by mouth daily.  cetirizine (ZYRTEC) 10 mg tablet Take  by mouth daily. No Known Allergies  Social History     Social History    Marital status:      Spouse name: N/A    Number of children: N/A    Years of education: N/A     Social History Main Topics    Smoking status: Never Smoker    Smokeless tobacco: Never Used    Alcohol use No      Comment: rarely    Drug use: No    Sexual activity: Yes     Partners: Male     Birth control/ protection: Surgical     Other Topics Concern    None     Social History Narrative     Visit Vitals    /76 (BP 1 Location: Right arm, BP Patient Position: Sitting)    Pulse 73    Temp 98.6 °F (37 °C) (Oral)    Resp 14    Ht 5' 7\" (1.702 m)    Wt 174 lb 3.2 oz (79 kg)    SpO2 96%    BMI 27.28 kg/m2     She has near total blockage of the right ear with cerumen, quite dry and blocking at least 85% of the ear canal and I cannot see the tympanic membrane on that side. On the other side there is no wax and good light reflex from the tympanic membrane. Oral cavity reveals no lesions. Neck reveals no adenopathy or thyromegaly. Carotids are 2+ without bruits. Lungs are clear to percussion. Good breath sounds with no wheezing or crackles. Heart reveals a regular rhythm with normal S1 and S2 no murmur gallop click or rub. Apical impulse is not palpable. Abdomen is soft and nontender with no hepatosplenomegaly or masses and no bruits. Extremities reveal no clubbing cyanosis or edema. Pulses are 2+. Breasts reveal no masses no skin or nipple abnormalities and no x-ray adenopathy. Skin reveals tiny reddish vesicles on the arms and trunk and a few on the legs. Nonspecific appearance.     Results for orders placed or performed in visit on 08/03/18   CBC WITH AUTOMATED DIFF   Result Value Ref Range    WBC 5.0 3.4 - 10.8 x10E3/uL    RBC 4.73 3.77 - 5.28 x10E6/uL    HGB 12.9 11.1 - 15.9 g/dL    HCT 41.3 34.0 - 46.6 %    MCV 87 79 - 97 fL    MCH 27.3 26.6 - 33.0 pg    MCHC 31.2 (L) 31.5 - 35.7 g/dL    RDW 13.2 12.3 - 15.4 %    PLATELET 435 407 - 774 x10E3/uL    NEUTROPHILS 46 Not Estab. %    Lymphocytes 31 Not Estab. %    MONOCYTES 16 Not Estab. %    EOSINOPHILS 6 Not Estab. %    BASOPHILS 1 Not Estab. %    ABS. NEUTROPHILS 2.3 1.4 - 7.0 x10E3/uL    Abs Lymphocytes 1.6 0.7 - 3.1 x10E3/uL    ABS. MONOCYTES 0.8 0.1 - 0.9 x10E3/uL    ABS. EOSINOPHILS 0.3 0.0 - 0.4 x10E3/uL    ABS. BASOPHILS 0.1 0.0 - 0.2 x10E3/uL    IMMATURE GRANULOCYTES 0 Not Estab. %    ABS. IMM. GRANS. 0.0 0.0 - 0.1 Y94F2/MC   METABOLIC PANEL, COMPREHENSIVE   Result Value Ref Range    Glucose 93 65 - 99 mg/dL    BUN 13 8 - 27 mg/dL    Creatinine 0.74 0.57 - 1.00 mg/dL    GFR est non-AA 82 >59 mL/min/1.73    GFR est AA 95 >59 mL/min/1.73    BUN/Creatinine ratio 18 12 - 28    Sodium 142 134 - 144 mmol/L    Potassium 4.4 3.5 - 5.2 mmol/L    Chloride 103 96 - 106 mmol/L    CO2 27 20 - 29 mmol/L    Calcium 9.1 8.7 - 10.3 mg/dL    Protein, total 6.5 6.0 - 8.5 g/dL    Albumin 3.9 3.5 - 4.8 g/dL    GLOBULIN, TOTAL 2.6 1.5 - 4.5 g/dL    A-G Ratio 1.5 1.2 - 2.2    Bilirubin, total 0.5 0.0 - 1.2 mg/dL    Alk. phosphatase 80 39 - 117 IU/L    AST (SGOT) 15 0 - 40 IU/L    ALT (SGPT) 8 0 - 32 IU/L   LIPID PANEL   Result Value Ref Range    Cholesterol, total 204 (H) 100 - 199 mg/dL    Triglyceride 110 0 - 149 mg/dL    HDL Cholesterol 45 >39 mg/dL    VLDL, calculated 22 5 - 40 mg/dL    LDL, calculated 137 (H) 0 - 99 mg/dL   TSH 3RD GENERATION   Result Value Ref Range    TSH 3.830 0.450 - 4.500 uIU/mL   T4, FREE   Result Value Ref Range    T4, Free 1.15 0.82 - 1.77 ng/dL   VITAMIN B12   Result Value Ref Range    Vitamin B12 441 232 - 1245 pg/mL   CVD REPORT   Result Value Ref Range    INTERPRETATION Note      Assessment: #1. Hypertension is well controlled.   She will continue amlodipine 5 mg daily. #2. Hyperlipidemia doing well, she will continue pravastatin 40 mg each evening. She will continue low-fat diet and try to keep her weight down. #3. Hypothyroidism with normal TSH and T4 and clinically euthyroid. She will continue levothyroxine 112 mcg daily. #4.  Urinary incontinence despite recent treatments as above, recommended that if she does try medicine that she have that prescribed by her urologist.  #5.  Itchy minimal rash, she will see her dermatologist for further evaluation of that. #6. She is overweight has gained 6 pounds last year and is going to work on weight loss. #7.  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 I will schedule mammography and bone density for her and she will receive PCV 13 now and she will get a flu shot when available later this fall. Corona Hankins MD FACP    Please note: This document has been produced using voice recognition software. Unrecognized errors in transcription may be present.

## 2018-08-10 NOTE — MR AVS SNAPSHOT
[address] [facility] [address] 
 
 
 [address], [facility] [address] [facility] [address] 
[phone] Patient: Haroon Velazco MRN: [id number] Visit Information Date & Time Provider Department Dept. Phone Encounter #  
 8/10/2018 10:30 AM Xochilt Mahmood MD [facility] [id number] Your Appointments 8/10/2018 10:30 AM  
PHYSICAL with Xochlit Mahmood MD  
[facility] [address]) Appt Note: Physical  
 [address], [address] [facility] [address] [place]  
  
   
 [address], [address] [facility] 200 [facility]  
  
    
 2/13/2019  2:00 PM  
Office Visit with Xochilt Mahmood MD  
[facility] [address]) Appt Note: 6 month f/u  
 [address] [facility] [phone] [address] [place]  
  
   
 [address], [phone] [phone]  
  
    
  
 9/4/2018  2:15 PM  
Any with Christie Roy MD  
[facility] ([address] [place] [address]) Appt Note: EP- 2 month follow up around 08/15/2018.; new date and time confirmed with patient hunter on 07/30  
 [address]  [address] [id number] [address]  
  
   
 [address] [place] [address] [address] [place] [address] 100 200 [facility] Upcoming Health Maintenance Date Due DTaP/Tdap/Td series (1 - Tdap) 1/10/1969 Bone Densitometry (Dexa) Screening 1/10/2013 Pneumococcal 65+ Low/Medium Risk (2 of 2 - PCV13) 5/23/2015 GLAUCOMA SCREENING Q2Y 3/11/2017 Influenza Age 5 to Adult 8/1/2018 MEDICARE YEARLY EXAM 8/3/2018 BREAST CANCER SCRN MAMMOGRAM 11/2/2018 COLONOSCOPY 2/21/2022 Allergies as of 8/10/2018  Review Complete On: 8/10/2018 By: Xochilt Mahmood MD  
 No Known Allergies Current Immunizations  Reviewed on 8/10/2018 Name Date Pneumococcal Conjugate (PCV-13) 8/10/2018 Pneumococcal Polysaccharide (PPSV-23) 5/23/2014  2:29 PM  
 Zoster 4/13/2009 Reviewed by Adrian Talley MD on 8/10/2018 at  9:55 AM  
You Were Diagnosed With   
  
 Codes Comments Essential hypertension with goal blood pressure less than 140/90    -  Primary ICD-10-CM: I10 
ICD-9-CM: 401.9 Hypovitaminosis D     ICD-10-CM: E55.9 ICD-9-CM: 268.9 Mixed incontinence     ICD-10-CM: N39.46 
ICD-9-CM: 788.33 Hyperlipidemia LDL goal <130     ICD-10-CM: E78.5 ICD-9-CM: 272.4 Postmenopausal osteoporosis     ICD-10-CM: M81.0 ICD-9-CM: 733.01 Screening mammogram, encounter for     ICD-10-CM: Z12.31 
ICD-9-CM: V76.12 Acquired hypothyroidism     ICD-10-CM: E03.9 ICD-9-CM: 244.9 Rash     ICD-10-CM: R21 
ICD-9-CM: 782.1 Major depression in complete remission ([facility]     ICD-10-CM: F32.5 ICD-9-CM: 296.26 Encounter for immunization     ICD-10-CM: Z00 ICD-9-CM: V03.89 Vitals BP Pulse Temp Resp Height(growth percentile) Weight(growth percentile) 122/76 (BP 1 Location: Right arm, BP Patient Position: Sitting) 73 98.6 °F (37 °C) (Oral) 14 5' 7\" (1.702 m) 174 lb 3.2 oz (79 kg) SpO2 BMI OB Status Smoking Status 96% 27.28 kg/m2 Hysterectomy Never Smoker Vitals History BMI and BSA Data Body Mass Index Body Surface Area  
 27.28 kg/m 2 1.93 m 2 Preferred Pharmacy Pharmacy Name Phone [address], Tanika Darrelljelena [address] [place] [address] [phone] [facility] [phone] Your Updated Medication List  
  
   
This list is accurate as of 8/10/18 10:27 AM.  Always use your most recent med list. amLODIPine 5 mg tablet Commonly known as:  Angella Spruce TAKE 1 TABLET EVERY DAY  
  
 baclofen 10 mg tablet Commonly known as:  LIORESAL Take 1 Tab by mouth three (3) times daily. cholecalciferol (vitamin D3) 2,000 unit Tab Take 2,000 Units by mouth daily. levothyroxine 112 mcg tablet Commonly known as:  SYNTHROID  
TAKE 1 TABLET DAILY BEFORE BREAKFAST LORazepam 0.5 mg tablet Commonly known as:  ATIVAN Take 1 Tab by mouth every four (4) hours as needed for Anxiety. Max Daily Amount: 3 mg.  
  
 pravastatin 40 mg tablet Commonly known as:  PRAVACHOL  
TAKE 1 TABLET NIGHTLY  
  
 sertraline 100 mg tablet Commonly known as:  ZOLOFT  
TAKE 1 TABLET EVERY DAY  
  
 vitamin e 1,000 unit capsule Commonly known as:  E GEMS Take 1,000 Units by mouth daily. ZyrTEC 10 mg tablet Generic drug:  cetirizine Take  by mouth daily. We Performed the Following PNEUMOCOCCAL CONJ VACCINE 13 VALENT IM [id number] CPT(R)] To-Do List   
 Around 08/10/2018 Imaging:  DEXA BONE DENSITY STUDY AXIAL Around 08/10/2018 Imaging:  RUCHI MAMMO BI SCREENING INCL CAD Around 02/18/2019 Lab:  LIPID PANEL Around 02/18/2019 Lab:  METABOLIC PANEL, COMPREHENSIVE Around 02/18/2019 Lab:  VITAMIN D, 25 HYDROXY Introducing \[facility] [place] [facility]\"" & [facility] SERVICES! Dear Jason Lao: 
Thank you for requesting a Vis[employer] account. Our records indicate that you already have an active Visante account. You can access your account anytime at [email or web address] Did you know that you can access your hospital and ER discharge instructions at any time in Visante? You can also review all of your test results from your hospital stay or ER visit. Additional Information If you have questions, please visit the Frequently Asked Questions section of the Visante website at [email or web address] Remember, Visante is NOT to be used for urgent needs. For medical emergencies, dial 911. Now available from your iPhone and Android! Please provide this summary of care documentation to your next provider. Your primary care clinician is listed as Britt Nj. Nemesio Tapia.  If you have any questions after today's visit, please call [phone].

## 2018-08-17 ENCOUNTER — HOSPITAL ENCOUNTER (OUTPATIENT)
Dept: MAMMOGRAPHY | Age: 68
Discharge: HOME | End: 2018-08-17
Attending: INTERNAL MEDICINE
Payer: MEDICARE

## 2018-08-17 ENCOUNTER — HOSPITAL ENCOUNTER (OUTPATIENT)
Dept: BONE DENSITY | Age: 68
Discharge: HOME | End: 2018-08-17
Attending: INTERNAL MEDICINE
Payer: MEDICARE

## 2018-08-17 DIAGNOSIS — Z12.31 VISIT FOR SCREENING MAMMOGRAM: ICD-10-CM

## 2018-08-17 DIAGNOSIS — M81.0 POSTMENOPAUSAL OSTEOPOROSIS: ICD-10-CM

## 2018-08-17 PROCEDURE — 77063 BREAST TOMOSYNTHESIS BI: CPT

## 2018-08-17 PROCEDURE — 77080 DXA BONE DENSITY AXIAL: CPT

## 2018-08-20 ENCOUNTER — TELEPHONE (OUTPATIENT)
Dept: INTERNAL MEDICINE CLINIC | Age: 68
End: 2018-08-20

## 2018-08-20 NOTE — TELEPHONE ENCOUNTER
----- Message from Philip Trujillo MD sent at 8/20/2018  4:24 PM EDT -----  Please notify the patient that her bone density is quite good, she is very slightly in the osteopenic range but most of the sites tested are actually in the normal range. Nowhere near osteoporosis.

## 2018-08-20 NOTE — PROGRESS NOTES
Please notify the patient that her bone density is quite good, she is very slightly in the osteopenic range but most of the sites tested are actually in the normal range. Nowhere near osteoporosis.

## 2019-02-05 ENCOUNTER — HOSPITAL ENCOUNTER (OUTPATIENT)
Dept: LAB | Age: 69
Discharge: HOME | End: 2019-02-05

## 2019-02-05 ENCOUNTER — APPOINTMENT (OUTPATIENT)
Dept: INTERNAL MEDICINE CLINIC | Age: 69
End: 2019-02-05

## 2019-02-05 PROCEDURE — 99001 SPECIMEN HANDLING PT-LAB: CPT

## 2019-02-06 LAB
25(OH)D3+25(OH)D2 SERPL-MCNC: 34.3 NG/ML (ref 30–100)
ALBUMIN SERPL-MCNC: 4.1 G/DL (ref 3.5–4.8)
ALBUMIN/GLOB SERPL: 1.6 {RATIO} (ref 1.2–2.2)
ALP SERPL-CCNC: 81 IU/L (ref 39–117)
ALT SERPL-CCNC: 9 IU/L (ref 0–32)
AST SERPL-CCNC: 16 IU/L (ref 0–40)
BILIRUB SERPL-MCNC: 0.5 MG/DL (ref 0–1.2)
BUN SERPL-MCNC: 11 MG/DL (ref 8–27)
BUN/CREAT SERPL: 15 (ref 12–28)
CALCIUM SERPL-MCNC: 9.4 MG/DL (ref 8.7–10.3)
CHLORIDE SERPL-SCNC: 103 MMOL/L (ref 96–106)
CHOLEST SERPL-MCNC: 198 MG/DL (ref 100–199)
CO2 SERPL-SCNC: 26 MMOL/L (ref 20–29)
CREAT SERPL-MCNC: 0.75 MG/DL (ref 0.57–1)
GLOBULIN SER CALC-MCNC: 2.6 G/DL (ref 1.5–4.5)
GLUCOSE SERPL-MCNC: 94 MG/DL (ref 65–99)
HDLC SERPL-MCNC: 46 MG/DL
INTERPRETATION, 910389: NORMAL
LDLC SERPL CALC-MCNC: 125 MG/DL (ref 0–99)
POTASSIUM SERPL-SCNC: 4.4 MMOL/L (ref 3.5–5.2)
PROT SERPL-MCNC: 6.7 G/DL (ref 6–8.5)
SODIUM SERPL-SCNC: 144 MMOL/L (ref 134–144)
TRIGL SERPL-MCNC: 133 MG/DL (ref 0–149)
VLDLC SERPL CALC-MCNC: 27 MG/DL (ref 5–40)

## 2019-02-13 ENCOUNTER — OFFICE VISIT (OUTPATIENT)
Dept: INTERNAL MEDICINE CLINIC | Age: 69
End: 2019-02-13

## 2019-02-13 VITALS
RESPIRATION RATE: 12 BRPM | TEMPERATURE: 98.8 F | SYSTOLIC BLOOD PRESSURE: 130 MMHG | WEIGHT: 184.2 LBS | HEART RATE: 90 BPM | OXYGEN SATURATION: 96 % | DIASTOLIC BLOOD PRESSURE: 70 MMHG | BODY MASS INDEX: 28.91 KG/M2 | HEIGHT: 67 IN

## 2019-02-13 DIAGNOSIS — E03.9 ACQUIRED HYPOTHYROIDISM: ICD-10-CM

## 2019-02-13 DIAGNOSIS — F32.5 MAJOR DEPRESSION IN COMPLETE REMISSION (HCC): ICD-10-CM

## 2019-02-13 DIAGNOSIS — I10 ESSENTIAL HYPERTENSION WITH GOAL BLOOD PRESSURE LESS THAN 140/90: ICD-10-CM

## 2019-02-13 DIAGNOSIS — E78.5 HYPERLIPIDEMIA LDL GOAL <130: Primary | ICD-10-CM

## 2019-02-13 DIAGNOSIS — E66.3 OVERWEIGHT (BMI 25.0-29.9): ICD-10-CM

## 2019-02-13 RX ORDER — AZITHROMYCIN 250 MG/1
TABLET, FILM COATED ORAL
Qty: 6 TAB | Refills: 0 | Status: SHIPPED | OUTPATIENT
Start: 2019-02-13 | End: 2019-02-18

## 2019-02-13 RX ORDER — PHENTERMINE HYDROCHLORIDE 37.5 MG/1
37.5 TABLET ORAL
Qty: 30 TAB | Refills: 3 | Status: SHIPPED | OUTPATIENT
Start: 2019-02-13 | End: 2019-12-24 | Stop reason: ALTCHOICE

## 2019-02-13 NOTE — PROGRESS NOTES
Dolores Lennon 1950, is a 68 y.o. female, who is seen today for reevaluation of hyperlipidemia, hypertension, overweight, depression. Her depression is doing very well she continues sertraline. She is in full remission, does not feel depressed at all. She takes all her medicine correctly but she has gained 12 pounds, she had done well previously with phentermine but has not used that for the last couple of years. For the last month she has had some discomfort in her right third finger, it is stiff and she has a hard time getting it opened in the morning and sometimes it pops during the day and hurts briefly. Last 2 days she has had nasal congestion, frontal headache and is coughing up some thick green mucus at times. No chills or sweats and no myalgia. Past Medical History:  
Diagnosis Date  Allergic rhinitis  Atrophic vaginitis  Back pain  Cystitis  Epigastric pain  Headache(784.0)   
 migraine  Hypercholesterolemia  Hypertension  Neck pain  Numbness of arm 12/18/06  
 left  LOREN (obstructive sleep apnea)   
 no cpap  Phlebitis of saphenous vein 4/27/07 Left  Thyroid disease   
 hypothyroidism  Urinary incontinence Current Outpatient Medications Medication Sig Dispense Refill  fesoterodine (TOVIAZ) 4 mg SR tablet Take 1 Tab by mouth daily. 90 Tab 3  
 LORazepam (ATIVAN) 0.5 mg tablet Take 1 Tab by mouth every four (4) hours as needed for Anxiety. Max Daily Amount: 3 mg. 60 Tab 0  cholecalciferol, vitamin D3, 2,000 unit tab Take 2,000 Units by mouth daily.  amLODIPine (NORVASC) 5 mg tablet TAKE 1 TABLET EVERY DAY 90 Tab 3  
 sertraline (ZOLOFT) 100 mg tablet TAKE 1 TABLET EVERY DAY 90 Tab 3  pravastatin (PRAVACHOL) 40 mg tablet TAKE 1 TABLET NIGHTLY 90 Tab 3  
 levothyroxine (SYNTHROID) 112 mcg tablet TAKE 1 TABLET DAILY BEFORE BREAKFAST 90 Tab 3  
  baclofen (LIORESAL) 10 mg tablet Take 1 Tab by mouth three (3) times daily. 270 Tab 1  vitamin e (E GEMS) 1,000 unit capsule Take 1,000 Units by mouth daily.  cetirizine (ZYRTEC) 10 mg tablet Take  by mouth daily. Visit Vitals /70 (BP 1 Location: Left arm, BP Patient Position: Sitting) Pulse 90 Temp 98.8 °F (37.1 °C) (Oral) Resp 12 Ht 5' 7\" (1.702 m) Wt 184 lb 3.2 oz (83.6 kg) SpO2 96% BMI 28.85 kg/m² Nasal passages reveal small amount of thick yellow secretions on the right and no secretions on the left. Pharynx reveals no redness exudate or drainage. Maxillary and frontal regions are nontender. Neck reveals no adenopathy or tenderness. Carotids are 2+ without bruits. Lungs are clear to percussion. Good breath sounds with no wheezing or crackles. Heart reveals a regular rhythm with no murmur gallop click or rub. Abdomen soft and nontender with no hepatosplenic megaly or masses and no bruits. Extremities reveal no clubbing cyanosis or edema. There is no popping of the third right finger currently but she points to the PIP is where the pain and stiffness seem to emanate. Pulses are 2+. Results for orders placed or performed in visit on 02/06/19 CULTURE, URINE Result Value Ref Range Urine Culture, Routine No growth AMB POC URINALYSIS DIP STICK AUTO W/O MICRO Result Value Ref Range Color (UA POC) Yellow Clarity (UA POC) Clear Glucose (UA POC) Negative Negative Bilirubin (UA POC) Negative Negative Ketones (UA POC) Negative Negative Specific gravity (UA POC) 1.025 1.001 - 1.035 Blood (UA POC) 2+ Negative pH (UA POC) 6.0 4.6 - 8.0 Protein (UA POC) Negative Negative Urobilinogen (UA POC) 1 mg/dL 0.2 - 1 Nitrites (UA POC) Negative Negative Leukocyte esterase (UA POC) Negative Negative Recent LDL cholesterol 125, vitamin D 34.3 and basic chemistries normal. 
 
 Assessment: #1. Hyperlipidemia doing quite well, she will continue pravastatin 40 mg each evening. #2. Hypertension is controlled, she will continue amlodipine 5 mg daily. #3.  Upper restaurant infection, will treat with azithromycin. #4.  Probable trigger finger. We will have her evaluated by Dr. Mary Blackmon. #5.  Overweight, up 12 pounds recently, she is going to work on her diet and she is going to use phentermine for a while, 37.5 mg daily. #6.  History of depression in full remission, she will continue sertraline 100 mg daily. #7.  Hypothyroidism, last TSH was normal, she will continue levothyroxine 112 mcg daily. Follow-up in 6 months for complete evaluation or sooner if needed Corona Cheek [facility], [address] Please note: This document has been produced using voice recognition software. Unrecognized errors in transcription may be present.

## 2019-03-11 RX ORDER — PRAVASTATIN SODIUM 40 MG/1
TABLET ORAL
Qty: 90 TAB | Refills: 3 | Status: SHIPPED | OUTPATIENT
Start: 2019-03-11 | End: 2020-04-10

## 2019-03-11 RX ORDER — LEVOTHYROXINE SODIUM 112 UG/1
TABLET ORAL
Qty: 90 TAB | Refills: 3 | Status: SHIPPED | OUTPATIENT
Start: 2019-03-11 | End: 2020-04-10

## 2019-03-11 RX ORDER — AMLODIPINE BESYLATE 5 MG/1
TABLET ORAL
Qty: 90 TAB | Refills: 3 | Status: SHIPPED | OUTPATIENT
Start: 2019-03-11 | End: 2020-04-10

## 2019-03-11 RX ORDER — SERTRALINE HYDROCHLORIDE 100 MG/1
TABLET, FILM COATED ORAL
Qty: 90 TAB | Refills: 3 | Status: SHIPPED | OUTPATIENT
Start: 2019-03-11 | End: 2020-04-10

## 2019-06-13 ENCOUNTER — OFFICE VISIT (OUTPATIENT)
Dept: ORTHOPEDIC SURGERY | Age: 69
End: 2019-06-13

## 2019-06-13 VITALS
RESPIRATION RATE: 16 BRPM | DIASTOLIC BLOOD PRESSURE: 89 MMHG | SYSTOLIC BLOOD PRESSURE: 152 MMHG | HEIGHT: 67 IN | OXYGEN SATURATION: 97 % | TEMPERATURE: 97.5 F | HEART RATE: 73 BPM | BODY MASS INDEX: 28.6 KG/M2 | WEIGHT: 182.2 LBS

## 2019-06-13 DIAGNOSIS — M65.331 TRIGGER MIDDLE FINGER OF RIGHT HAND: Primary | ICD-10-CM

## 2019-06-13 DIAGNOSIS — M65.332 TRIGGER MIDDLE FINGER OF LEFT HAND: ICD-10-CM

## 2019-06-13 NOTE — PROGRESS NOTES
Vania Madden is a 68 y.o. female right handed retiree. Worker's Compensation and legal considerations: not known. Vitals:    06/13/19 1046   BP: 152/89   Pulse: 73   Resp: 16   Temp: 97.5 °F (36.4 °C)   TempSrc: Oral   SpO2: 97%   Weight: 182 lb 3.2 oz (82.6 kg)   Height: 5' 7\" (1.702 m)   PainSc:   5   PainLoc: Hand           Chief Complaint   Patient presents with    Hand Pain     renato hand middle finger; pt states unable to bend finger x 6 months          HPI: Patient comes in today with complaints of bilateral middle finger pain and inability to fully flex right worse than left. She also reports some pain in the palm of the hand in line with the middle fingers. Date of onset: January 2019 approximately    Injury: No    Prior Treatment:  No    Numbness/ Tingling: No    ROS: Review of Systems - General ROS: negative  Respiratory ROS: no cough, shortness of breath, or wheezing  Cardiovascular ROS: no chest pain or dyspnea on exertion  Musculoskeletal ROS: positive for - pain in hand - bilateral  Neurological ROS: negative  Dermatological ROS: negative    Past Medical History:   Diagnosis Date    Allergic rhinitis     Atrophic vaginitis     Back pain     Cystitis     Epigastric pain     Headache(784.0)     migraine    Hypercholesterolemia     Hypertension     Neck pain     Numbness of arm 12/18/06    left    LOREN (obstructive sleep apnea)     no cpap    Phlebitis of saphenous vein 4/27/07    Left    Thyroid disease     hypothyroidism    Urinary incontinence        Past Surgical History:   Procedure Laterality Date    HX CERVICAL DISKECTOMY  6/7/2016    HX COLONOSCOPY  approximately 2013    HX HYSTERECTOMY  approximately 1985    HX LUMBAR DISKECTOMY  2016    HX ORTHOPAEDIC  2006    right knee surgery       Current Outpatient Medications   Medication Sig Dispense Refill    triamcinolone acetonide (KENALOG) 10 mg/mL injection 1 mL by Intra artICUlar route once for 1 dose.  1 Vial 0  TOVIAZ 8 mg ER tablet Take 1 Tab by mouth daily. 90 Tab 3    levothyroxine (SYNTHROID) 112 mcg tablet TAKE 1 TABLET DAILY BEFORE BREAKFAST 90 Tab 3    sertraline (ZOLOFT) 100 mg tablet TAKE 1 TABLET EVERY DAY 90 Tab 3    pravastatin (PRAVACHOL) 40 mg tablet TAKE 1 TABLET NIGHTLY 90 Tab 3    amLODIPine (NORVASC) 5 mg tablet TAKE 1 TABLET EVERY DAY 90 Tab 3    phentermine (ADIPEX-P) 37.5 mg tablet Take 1 Tab by mouth every morning. Max Daily Amount: 37.5 mg. 30 Tab 3    LORazepam (ATIVAN) 0.5 mg tablet Take 1 Tab by mouth every four (4) hours as needed for Anxiety. Max Daily Amount: 3 mg. 60 Tab 0    cholecalciferol, vitamin D3, 2,000 unit tab Take 2,000 Units by mouth daily.  amLODIPine (NORVASC) 5 mg tablet TAKE 1 TABLET EVERY DAY 90 Tab 3    baclofen (LIORESAL) 10 mg tablet Take 1 Tab by mouth three (3) times daily. 270 Tab 1    vitamin e (E GEMS) 1,000 unit capsule Take 1,000 Units by mouth daily.  cetirizine (ZYRTEC) 10 mg tablet Take  by mouth daily. No Known Allergies      PE:     Hand:    Examination L Digit(s) R Digit(s)   1st CMC Tenderness -  -    1st CMC Grind -  -    Erickson Nodes -  -    Heberden Nodes -  -    A1 Pulley Tenderness + LF + LF   Triggering +  +    UCL Instability -  -    RCL Instability -  -    Lateral Stress Pain -  -    Palmar Cords -  -    Tabletop test -  -    Garrod's Pads -  -     Strength       Pinch Strength         ROM: Full      Imaging: None indicated today        ICD-10-CM ICD-9-CM    1. Trigger middle finger of right hand M65.331 727.03 TRIAMCINOLONE ACETONIDE INJ      triamcinolone acetonide (KENALOG) 10 mg/mL injection      INJECT TENDON ORIGIN/INSERT   2.  Trigger middle finger of left hand M65.332 727.03 TRIAMCINOLONE ACETONIDE INJ      triamcinolone acetonide (KENALOG) 10 mg/mL injection      INJECT TENDON ORIGIN/INSERT       Plan:     Bilateral long finger A1 pulley injections    Follow-up PRN    Plan was reviewed with patient, who verbalized agreement and understanding of the plan    [address] [facility]  OFFICE PROCEDURE PROGRESS NOTE        Chart reviewed for the following:   Jacob SARAH DO, have reviewed the History, Physical and updated the Allergic reactions for Alana Núñez performed immediately prior to start of procedure:   Jacob SARAH DO, have performed the following reviews on [facility] prior to the start of the procedure:            * Patient was identified by name and date of birth   * Agreement on procedure being performed was verified  * Risks and Benefits explained to the patient  * Procedure site verified and marked as necessary  * Patient was positioned for comfort  * Consent was signed and verified     Time: 11:10 AM      Date of procedure: 6/13/2019    Procedure performed by: Franklin Rodriguez DO    Provider assisted by: Hampton LPN    Patient assisted by: self    How tolerated by patient: tolerated the procedure well with no complications    Post Procedural Pain Scale: 0 - No Hurt    Comments: none    Procedure:  After consent was obtained, using sterile technique the A1 pulleys was prepped. Local anesthetic used: 1% lidocaine. Kenalog 5 mg X2 and was then injected and the needle withdrawn. The procedure was well tolerated. The patient is asked to continue to rest the area for a few more days before resuming regular activities. It may be more painful for the first 1-2 days. Watch for fever, or increased swelling or persistent pain in the joint. Call or return to clinic prn if such symptoms occur or there is failure to improve as anticipated.

## 2019-08-27 ENCOUNTER — APPOINTMENT (OUTPATIENT)
Dept: INTERNAL MEDICINE CLINIC | Age: 69
End: 2019-08-27

## 2019-08-28 LAB
ALBUMIN SERPL-MCNC: 4.1 G/DL (ref 3.5–4.8)
ALBUMIN/GLOB SERPL: 1.6 {RATIO} (ref 1.2–2.2)
ALP SERPL-CCNC: 75 IU/L (ref 39–117)
ALT SERPL-CCNC: 13 IU/L (ref 0–32)
AST SERPL-CCNC: 18 IU/L (ref 0–40)
BASOPHILS # BLD AUTO: 0 X10E3/UL (ref 0–0.2)
BASOPHILS NFR BLD AUTO: 1 %
BILIRUB SERPL-MCNC: 0.3 MG/DL (ref 0–1.2)
BUN SERPL-MCNC: 13 MG/DL (ref 8–27)
BUN/CREAT SERPL: 15 (ref 12–28)
CALCIUM SERPL-MCNC: 9 MG/DL (ref 8.7–10.3)
CHLORIDE SERPL-SCNC: 104 MMOL/L (ref 96–106)
CHOLEST SERPL-MCNC: 193 MG/DL (ref 100–199)
CO2 SERPL-SCNC: 27 MMOL/L (ref 20–29)
CREAT SERPL-MCNC: 0.85 MG/DL (ref 0.57–1)
EOSINOPHIL # BLD AUTO: 0.4 X10E3/UL (ref 0–0.4)
EOSINOPHIL NFR BLD AUTO: 8 %
ERYTHROCYTE [DISTWIDTH] IN BLOOD BY AUTOMATED COUNT: 13.7 % (ref 12.3–15.4)
GLOBULIN SER CALC-MCNC: 2.6 G/DL (ref 1.5–4.5)
GLUCOSE SERPL-MCNC: 90 MG/DL (ref 65–99)
HCT VFR BLD AUTO: 39.9 % (ref 34–46.6)
HDLC SERPL-MCNC: 44 MG/DL
HGB BLD-MCNC: 12.6 G/DL (ref 11.1–15.9)
IMM GRANULOCYTES # BLD AUTO: 0 X10E3/UL (ref 0–0.1)
IMM GRANULOCYTES NFR BLD AUTO: 0 %
INTERPRETATION, 910389: NORMAL
LDLC SERPL CALC-MCNC: 131 MG/DL (ref 0–99)
LYMPHOCYTES # BLD AUTO: 1.6 X10E3/UL (ref 0.7–3.1)
LYMPHOCYTES NFR BLD AUTO: 30 %
MCH RBC QN AUTO: 27.9 PG (ref 26.6–33)
MCHC RBC AUTO-ENTMCNC: 31.6 G/DL (ref 31.5–35.7)
MCV RBC AUTO: 88 FL (ref 79–97)
MONOCYTES # BLD AUTO: 0.8 X10E3/UL (ref 0.1–0.9)
MONOCYTES NFR BLD AUTO: 15 %
NEUTROPHILS # BLD AUTO: 2.5 X10E3/UL (ref 1.4–7)
NEUTROPHILS NFR BLD AUTO: 46 %
PLATELET # BLD AUTO: 309 X10E3/UL (ref 150–450)
POTASSIUM SERPL-SCNC: 4.2 MMOL/L (ref 3.5–5.2)
PROT SERPL-MCNC: 6.7 G/DL (ref 6–8.5)
RBC # BLD AUTO: 4.52 X10E6/UL (ref 3.77–5.28)
SODIUM SERPL-SCNC: 145 MMOL/L (ref 134–144)
T4 FREE SERPL-MCNC: 1.27 NG/DL (ref 0.82–1.77)
TRIGL SERPL-MCNC: 92 MG/DL (ref 0–149)
TSH SERPL DL<=0.005 MIU/L-ACNC: 3.98 UIU/ML (ref 0.45–4.5)
VLDLC SERPL CALC-MCNC: 18 MG/DL (ref 5–40)
WBC # BLD AUTO: 5.4 X10E3/UL (ref 3.4–10.8)

## 2019-09-03 ENCOUNTER — OFFICE VISIT (OUTPATIENT)
Dept: INTERNAL MEDICINE CLINIC | Age: 69
End: 2019-09-03

## 2019-09-03 VITALS
HEIGHT: 67 IN | SYSTOLIC BLOOD PRESSURE: 124 MMHG | OXYGEN SATURATION: 97 % | BODY MASS INDEX: 28.44 KG/M2 | DIASTOLIC BLOOD PRESSURE: 80 MMHG | WEIGHT: 181.2 LBS | HEART RATE: 85 BPM | TEMPERATURE: 98.2 F | RESPIRATION RATE: 14 BRPM

## 2019-09-03 DIAGNOSIS — Z00.00 ROUTINE GENERAL MEDICAL EXAMINATION AT A HEALTH CARE FACILITY: Primary | ICD-10-CM

## 2019-09-03 DIAGNOSIS — F32.5 MAJOR DEPRESSION IN COMPLETE REMISSION (HCC): ICD-10-CM

## 2019-09-03 DIAGNOSIS — J06.9 UPPER RESPIRATORY TRACT INFECTION, UNSPECIFIED TYPE: ICD-10-CM

## 2019-09-03 DIAGNOSIS — E03.9 ACQUIRED HYPOTHYROIDISM: ICD-10-CM

## 2019-09-03 DIAGNOSIS — I10 PRIMARY HYPERTENSION: ICD-10-CM

## 2019-09-03 DIAGNOSIS — E78.5 HYPERLIPIDEMIA LDL GOAL <130: ICD-10-CM

## 2019-09-03 RX ORDER — AZITHROMYCIN 250 MG/1
TABLET, FILM COATED ORAL
Qty: 6 TAB | Refills: 0 | Status: SHIPPED | OUTPATIENT
Start: 2019-09-03 | End: 2019-09-08

## 2019-09-03 NOTE — PROGRESS NOTES
Terri Mercer 1950, is a 68 y.o. female, who is seen today for a routine physical exam and follow-up on hypertension hyperlipidemia depression overweight hypothyroidism and now with respiratory symptoms. For 2 weeks she has had nasal congestion and cough. Her cough is minimally productive but loose, no fever but some sweatiness periodically. She started using Mucinex DM about 3 or 4 days ago and is coughing a little less than she was. No dyspnea or wheezing. She is taking all of her medicine correctly. Past Medical History:   Diagnosis Date    Allergic rhinitis     Atrophic vaginitis     Back pain     Cystitis     Epigastric pain     Headache(784.0)     migraine    Hypercholesterolemia     Hypertension     Neck pain     Numbness of arm 12/18/06    left    LOREN (obstructive sleep apnea)     no cpap    Phlebitis of saphenous vein 4/27/07    Left    Thyroid disease     hypothyroidism    Urinary incontinence      Past Surgical History:   Procedure Laterality Date    HX CERVICAL DISKECTOMY  6/7/2016    HX COLONOSCOPY  approximately 2013    HX HYSTERECTOMY  approximately 1985    HX LUMBAR DISKECTOMY  2016    HX ORTHOPAEDIC  2006    right knee surgery     Current Outpatient Medications   Medication Sig Dispense Refill    azithromycin (ZITHROMAX) 250 mg tablet Take 2 tablets today, then take 1 tablet daily 6 Tab 0    TOVIAZ 8 mg ER tablet Take 1 Tab by mouth daily. 90 Tab 3    levothyroxine (SYNTHROID) 112 mcg tablet TAKE 1 TABLET DAILY BEFORE BREAKFAST 90 Tab 3    sertraline (ZOLOFT) 100 mg tablet TAKE 1 TABLET EVERY DAY 90 Tab 3    pravastatin (PRAVACHOL) 40 mg tablet TAKE 1 TABLET NIGHTLY 90 Tab 3    amLODIPine (NORVASC) 5 mg tablet TAKE 1 TABLET EVERY DAY 90 Tab 3    phentermine (ADIPEX-P) 37.5 mg tablet Take 1 Tab by mouth every morning.  Max Daily Amount: 37.5 mg. 30 Tab 3    LORazepam (ATIVAN) 0.5 mg tablet Take 1 Tab by mouth every four (4) hours as needed for Anxiety. Max Daily Amount: 3 mg. 60 Tab 0    cholecalciferol, vitamin D3, 2,000 unit tab Take 2,000 Units by mouth daily.  amLODIPine (NORVASC) 5 mg tablet TAKE 1 TABLET EVERY DAY 90 Tab 3    baclofen (LIORESAL) 10 mg tablet Take 1 Tab by mouth three (3) times daily. 270 Tab 1    vitamin e (E GEMS) 1,000 unit capsule Take 1,000 Units by mouth daily.  cetirizine (ZYRTEC) 10 mg tablet Take  by mouth daily. No Known Allergies  Social History     Socioeconomic History    Marital status:      Spouse name: Not on file    Number of children: Not on file    Years of education: Not on file    Highest education level: Not on file   Tobacco Use    Smoking status: Never Smoker    Smokeless tobacco: Never Used   Substance and Sexual Activity    Alcohol use: No     Comment: rarely    Drug use: No    Sexual activity: Yes     Partners: Male     Birth control/protection: Surgical     Visit Vitals  /80 (BP 1 Location: Left arm, BP Patient Position: Sitting)   Pulse 85   Temp 98.2 °F (36.8 °C) (Oral)   Resp 14   Ht 5' 7\" (1.702 m)   Wt 181 lb 3.2 oz (82.2 kg)   SpO2 97%   BMI 28.38 kg/m²     Carotids are nasal passages are clear with no secretions. Pharynx reveals no redness exudate or drainage. Sinuses are nontender. Neck reveals no adenopathy or thyromegaly and no tenderness. Carotids are 2+ without bruits. Lungs are clear to percussion. With forced expiration there is minimal end expiratory wheeze. The cough is quite loose initially but after several forced coughs secretions moved and I could not hear rhonchi at that point. No crackles were heard. Abdomen is soft and nontender with no hepatospleno megaly or masses and no bruits. Heart reveals a regular rhythm with normal S1 and S2 no murmur gallop click or rub. Apical impulse is not palpable. Extremities reveal no clubbing cyanosis or edema. Pulses are 2+. She declined to undress today so I did not check her breast today.   She had a mammogram about a year ago. Results for orders placed or performed in visit on 02/13/19   CBC WITH AUTOMATED DIFF   Result Value Ref Range    WBC 5.4 3.4 - 10.8 x10E3/uL    RBC 4.52 3.77 - 5.28 x10E6/uL    HGB 12.6 11.1 - 15.9 g/dL    HCT 39.9 34.0 - 46.6 %    MCV 88 79 - 97 fL    MCH 27.9 26.6 - 33.0 pg    MCHC 31.6 31.5 - 35.7 g/dL    RDW 13.7 12.3 - 15.4 %    PLATELET 204 710 - 117 x10E3/uL    NEUTROPHILS 46 Not Estab. %    Lymphocytes 30 Not Estab. %    MONOCYTES 15 Not Estab. %    EOSINOPHILS 8 Not Estab. %    BASOPHILS 1 Not Estab. %    ABS. NEUTROPHILS 2.5 1.4 - 7.0 x10E3/uL    Abs Lymphocytes 1.6 0.7 - 3.1 x10E3/uL    ABS. MONOCYTES 0.8 0.1 - 0.9 x10E3/uL    ABS. EOSINOPHILS 0.4 0.0 - 0.4 x10E3/uL    ABS. BASOPHILS 0.0 0.0 - 0.2 x10E3/uL    IMMATURE GRANULOCYTES 0 Not Estab. %    ABS. IMM. GRANS. 0.0 0.0 - 0.1 M57W0/DC   METABOLIC PANEL, COMPREHENSIVE   Result Value Ref Range    Glucose 90 65 - 99 mg/dL    BUN 13 8 - 27 mg/dL    Creatinine 0.85 0.57 - 1.00 mg/dL    GFR est non-AA 69 >59 mL/min/1.73    GFR est AA 80 >59 mL/min/1.73    BUN/Creatinine ratio 15 12 - 28    Sodium 145 (H) 134 - 144 mmol/L    Potassium 4.2 3.5 - 5.2 mmol/L    Chloride 104 96 - 106 mmol/L    CO2 27 20 - 29 mmol/L    Calcium 9.0 8.7 - 10.3 mg/dL    Protein, total 6.7 6.0 - 8.5 g/dL    Albumin 4.1 3.5 - 4.8 g/dL    GLOBULIN, TOTAL 2.6 1.5 - 4.5 g/dL    A-G Ratio 1.6 1.2 - 2.2    Bilirubin, total 0.3 0.0 - 1.2 mg/dL    Alk.  phosphatase 75 39 - 117 IU/L    AST (SGOT) 18 0 - 40 IU/L    ALT (SGPT) 13 0 - 32 IU/L   LIPID PANEL   Result Value Ref Range    Cholesterol, total 193 100 - 199 mg/dL    Triglyceride 92 0 - 149 mg/dL    HDL Cholesterol 44 >39 mg/dL    VLDL, calculated 18 5 - 40 mg/dL    LDL, calculated 131 (H) 0 - 99 mg/dL   TSH 3RD GENERATION   Result Value Ref Range    TSH 3.980 0.450 - 4.500 uIU/mL   T4, FREE   Result Value Ref Range    T4, Free 1.27 0.82 - 1.77 ng/dL   CVD REPORT   Result Value Ref Range INTERPRETATION Note      Assessment: #1. Hypertension controlled, she will continue amlodipine 5 mg daily. #2.  Upper respiratory infection for 2 weeks with loose secretions, occasionally she coughs up some yellow to green secretions. Will treat with azithromycin. 3.  Hyperlipidemia doing well, she will continue pravastatin 40 mg each evening. #4. Hypothyroidism with normal TSH, she will continue levothyroxine 112 mcg daily. #5.  History of depression in full remission, she will continue sertraline 100 mg daily. #6. She is still overweight but doing quite well and she wishes to continue phentermine. Follow-up in 6 months with [facility] PHILLIP Saavedra MD FACP    Please note: This document has been produced using voice recognition software. Unrecognized errors in transcription may be present.

## 2019-11-08 NOTE — ANESTHESIA PREPROCEDURE EVALUATION
Anesthetic History   No history of anesthetic complications            Review of Systems / Medical History  Patient summary reviewed and pertinent labs reviewed    Pulmonary        Sleep apnea           Neuro/Psych              Cardiovascular    Hypertension                   GI/Hepatic/Renal                Endo/Other      Hypothyroidism  Arthritis     Other Findings   Comments: Documentation of current medication  Current medications obtained, documented and obtained? YES      Risk Factors for Postoperative nausea/vomiting:       History of postoperative nausea/vomiting? NO       Female? YES       Motion sickness? NO       Intended opioid administration for postoperative analgesia? NO      Smoking Abstinence:  Current Smoker? NO  Elective Surgery? YES  Seen preoperatively by anesthesiologist or proxy prior to day of surgery? YES  Pt abstained from smoking 24 hours prior to anesthesia?  N/A    Preventive care/screening for High Blood Pressure:  Aged 18 years and older: YES  Screened for high blood pressure: YES  Patients with high blood pressure referred to primary care provider   for BP management: YES                 Physical Exam    Airway  Mallampati: III  TM Distance: 4 - 6 cm  Neck ROM: decreased range of motion   Mouth opening: Diminished (comment)     Cardiovascular    Rhythm: irregular  Rate: normal         Dental    Dentition: Poor dentition     Pulmonary  Breath sounds clear to auscultation               Abdominal  GI exam deferred       Other Findings            Anesthetic Plan    ASA: 3  Anesthesia type: MAC            Anesthetic plan and risks discussed with: Patient Pt was transferred to [facility] for further care. UA was completed there, will cancel order. Sarita Amato CMA

## 2019-12-16 ENCOUNTER — HOSPITAL ENCOUNTER (EMERGENCY)
Age: 69
Discharge: HOME | End: 2019-12-16
Attending: EMERGENCY MEDICINE
Payer: MEDICARE

## 2019-12-16 ENCOUNTER — APPOINTMENT (OUTPATIENT)
Dept: GENERAL RADIOLOGY | Age: 69
End: 2019-12-16
Attending: EMERGENCY MEDICINE
Payer: MEDICARE

## 2019-12-16 VITALS
WEIGHT: 180 LBS | DIASTOLIC BLOOD PRESSURE: 65 MMHG | OXYGEN SATURATION: 97 % | SYSTOLIC BLOOD PRESSURE: 150 MMHG | BODY MASS INDEX: 28.19 KG/M2 | TEMPERATURE: 98.1 F | HEART RATE: 55 BPM | RESPIRATION RATE: 17 BRPM

## 2019-12-16 DIAGNOSIS — I10 ELEVATED BLOOD PRESSURE READING WITH DIAGNOSIS OF HYPERTENSION: ICD-10-CM

## 2019-12-16 DIAGNOSIS — R11.0 NAUSEA WITHOUT VOMITING: ICD-10-CM

## 2019-12-16 DIAGNOSIS — R79.89 ELEVATED TSH: ICD-10-CM

## 2019-12-16 DIAGNOSIS — Z86.39 HISTORY OF HYPOTHYROIDISM: ICD-10-CM

## 2019-12-16 DIAGNOSIS — S22.41XA MULTIPLE FRACTURES OF RIBS, RIGHT SIDE, INITIAL ENCOUNTER FOR CLOSED FRACTURE: Primary | ICD-10-CM

## 2019-12-16 DIAGNOSIS — F41.9 ANXIETY: ICD-10-CM

## 2019-12-16 LAB
ANION GAP SERPL CALC-SCNC: 4 MMOL/L (ref 3–18)
APPEARANCE UR: CLEAR
ATRIAL RATE: 58 BPM
BASOPHILS # BLD: 0.1 K/UL (ref 0–0.1)
BASOPHILS NFR BLD: 1 % (ref 0–2)
BILIRUB UR QL: NEGATIVE
BUN SERPL-MCNC: 9 MG/DL (ref 7–18)
BUN/CREAT SERPL: 14 (ref 12–20)
CALCIUM SERPL-MCNC: 8.9 MG/DL (ref 8.5–10.1)
CALCULATED P AXIS, ECG09: 19 DEGREES
CALCULATED R AXIS, ECG10: 4 DEGREES
CALCULATED T AXIS, ECG11: -3 DEGREES
CHLORIDE SERPL-SCNC: 109 MMOL/L (ref 100–111)
CK MB CFR SERPL CALC: 2 % (ref 0–4)
CK MB SERPL-MCNC: 1.3 NG/ML (ref 5–25)
CK SERPL-CCNC: 65 U/L (ref 26–192)
CO2 SERPL-SCNC: 31 MMOL/L (ref 21–32)
COLOR UR: YELLOW
CREAT SERPL-MCNC: 0.65 MG/DL (ref 0.6–1.3)
DIAGNOSIS, 93000: NORMAL
DIFFERENTIAL METHOD BLD: ABNORMAL
EOSINOPHIL # BLD: 0.2 K/UL (ref 0–0.4)
EOSINOPHIL NFR BLD: 3 % (ref 0–5)
EPITH CASTS URNS QL MICRO: NORMAL /LPF (ref 0–5)
ERYTHROCYTE [DISTWIDTH] IN BLOOD BY AUTOMATED COUNT: 12.9 % (ref 11.6–14.5)
GLUCOSE SERPL-MCNC: 112 MG/DL (ref 74–99)
GLUCOSE UR STRIP.AUTO-MCNC: NEGATIVE MG/DL
HCT VFR BLD AUTO: 40.1 % (ref 35–45)
HGB BLD-MCNC: 13.1 G/DL (ref 12–16)
HGB UR QL STRIP: ABNORMAL
KETONES UR QL STRIP.AUTO: NEGATIVE MG/DL
LEUKOCYTE ESTERASE UR QL STRIP.AUTO: NEGATIVE
LYMPHOCYTES # BLD: 2 K/UL (ref 0.9–3.6)
LYMPHOCYTES NFR BLD: 26 % (ref 21–52)
MAGNESIUM SERPL-MCNC: 2.4 MG/DL (ref 1.6–2.6)
MCH RBC QN AUTO: 28.9 PG (ref 24–34)
MCHC RBC AUTO-ENTMCNC: 32.7 G/DL (ref 31–37)
MCV RBC AUTO: 88.5 FL (ref 74–97)
MONOCYTES # BLD: 0.9 K/UL (ref 0.05–1.2)
MONOCYTES NFR BLD: 13 % (ref 3–10)
NEUTS SEG # BLD: 4.2 K/UL (ref 1.8–8)
NEUTS SEG NFR BLD: 57 % (ref 40–73)
NITRITE UR QL STRIP.AUTO: NEGATIVE
P-R INTERVAL, ECG05: 170 MS
PH UR STRIP: 6 [PH] (ref 5–8)
PLATELET # BLD AUTO: 379 K/UL (ref 135–420)
PMV BLD AUTO: 10.2 FL (ref 9.2–11.8)
POTASSIUM SERPL-SCNC: 3.8 MMOL/L (ref 3.5–5.5)
PROT UR STRIP-MCNC: NEGATIVE MG/DL
Q-T INTERVAL, ECG07: 458 MS
QRS DURATION, ECG06: 98 MS
QTC CALCULATION (BEZET), ECG08: 449 MS
RBC # BLD AUTO: 4.53 M/UL (ref 4.2–5.3)
RBC #/AREA URNS HPF: NORMAL /HPF (ref 0–5)
SODIUM SERPL-SCNC: 144 MMOL/L (ref 136–145)
SP GR UR REFRACTOMETRY: 1.02 (ref 1–1.03)
TROPONIN I SERPL-MCNC: <0.02 NG/ML (ref 0–0.04)
TSH SERPL DL<=0.05 MIU/L-ACNC: 11.5 UIU/ML (ref 0.36–3.74)
UROBILINOGEN UR QL STRIP.AUTO: 1 EU/DL (ref 0.2–1)
VENTRICULAR RATE, ECG03: 58 BPM
WBC # BLD AUTO: 7.4 K/UL (ref 4.6–13.2)
WBC URNS QL MICRO: NORMAL /HPF (ref 0–4)

## 2019-12-16 PROCEDURE — 84443 ASSAY THYROID STIM HORMONE: CPT

## 2019-12-16 PROCEDURE — 93005 ELECTROCARDIOGRAM TRACING: CPT

## 2019-12-16 PROCEDURE — 80048 BASIC METABOLIC PNL TOTAL CA: CPT

## 2019-12-16 PROCEDURE — 96374 THER/PROPH/DIAG INJ IV PUSH: CPT

## 2019-12-16 PROCEDURE — 71101 X-RAY EXAM UNILAT RIBS/CHEST: CPT

## 2019-12-16 PROCEDURE — 82550 ASSAY OF CK (CPK): CPT

## 2019-12-16 PROCEDURE — 99284 EMERGENCY DEPT VISIT MOD MDM: CPT

## 2019-12-16 PROCEDURE — 83735 ASSAY OF MAGNESIUM: CPT

## 2019-12-16 PROCEDURE — 81001 URINALYSIS AUTO W/SCOPE: CPT

## 2019-12-16 PROCEDURE — 74011250636 HC RX REV CODE- 250/636: Performed by: EMERGENCY MEDICINE

## 2019-12-16 PROCEDURE — 85025 COMPLETE CBC W/AUTO DIFF WBC: CPT

## 2019-12-16 PROCEDURE — 96375 TX/PRO/DX INJ NEW DRUG ADDON: CPT

## 2019-12-16 RX ORDER — HYDROCODONE BITARTRATE AND ACETAMINOPHEN 5; 325 MG/1; MG/1
1 TABLET ORAL ONCE
Status: DISCONTINUED | OUTPATIENT
Start: 2019-12-16 | End: 2019-12-16

## 2019-12-16 RX ORDER — NALOXONE HYDROCHLORIDE 4 MG/.1ML
SPRAY NASAL
Qty: 2 EACH | Refills: 0 | Status: SHIPPED | OUTPATIENT
Start: 2019-12-16 | End: 2021-02-03

## 2019-12-16 RX ORDER — POLYETHYLENE GLYCOL 3350 17 G/17G
17 POWDER, FOR SOLUTION ORAL
Qty: 235 G | Refills: 0 | Status: SHIPPED | OUTPATIENT
Start: 2019-12-16 | End: 2022-09-21

## 2019-12-16 RX ORDER — ONDANSETRON 2 MG/ML
4 INJECTION INTRAMUSCULAR; INTRAVENOUS
Status: COMPLETED | OUTPATIENT
Start: 2019-12-16 | End: 2019-12-16

## 2019-12-16 RX ORDER — OXYCODONE AND ACETAMINOPHEN 5; 325 MG/1; MG/1
1 TABLET ORAL
Status: DISCONTINUED | OUTPATIENT
Start: 2019-12-16 | End: 2019-12-16

## 2019-12-16 RX ORDER — MORPHINE SULFATE 4 MG/ML
4 INJECTION INTRAVENOUS
Status: COMPLETED | OUTPATIENT
Start: 2019-12-16 | End: 2019-12-16

## 2019-12-16 RX ORDER — OXYCODONE AND ACETAMINOPHEN 5; 325 MG/1; MG/1
1 TABLET ORAL
Qty: 12 TAB | Refills: 0 | Status: SHIPPED | OUTPATIENT
Start: 2019-12-16 | End: 2019-12-21

## 2019-12-16 RX ORDER — ONDANSETRON 4 MG/1
4 TABLET, FILM COATED ORAL
Qty: 7 TAB | Refills: 0 | Status: SHIPPED | OUTPATIENT
Start: 2019-12-16 | End: 2021-02-03 | Stop reason: ALTCHOICE

## 2019-12-16 RX ADMIN — MORPHINE SULFATE 4 MG: 4 INJECTION INTRAVENOUS at 07:42

## 2019-12-16 RX ADMIN — ONDANSETRON 4 MG: 2 INJECTION INTRAMUSCULAR; INTRAVENOUS at 07:42

## 2019-12-16 NOTE — ED PROVIDER NOTES
EMERGENCY DEPARTMENT HISTORY AND PHYSICAL EXAM    7:08 AM      Date: 12/16/2019  Patient Name: Jean Luis    History of Presenting Illness     Chief Complaint   Patient presents with   [facility] Fall    Flank Pain         History Provided By: Patient    Additional History (Context): Jean Luis is a 68 y.o. female with Past medical history of allergic rhinitis, cystitis, migraine, hypertension who presents with chief complaint of severe right-sided lateral rib pain after a fall this morning. She states the pain is 10 out of 10. Patient states that she was walking down the steps and slipped on the last step causing her to fall. She states that her right side rib cage hit the corner of the wall and then the banister. After the fall she became a little lightheaded and with some mild nausea. No head injury, headache, LOC, neck pain, numbness, weakness, vomiting, chest pain, shortness of breath, abdominal pain, hip pain, blurry vision, no other complaint.     PCP: Tamiko Castillo MD        Past History     Past Medical History:  Past Medical History:   Diagnosis Date    Allergic rhinitis     Atrophic vaginitis     Back pain     Cystitis     Epigastric pain     Headache(784.0)     migraine    Hypercholesterolemia     Hypertension     Neck pain     Numbness of arm 12/18/06    left    LOREN (obstructive sleep apnea)     no cpap    Phlebitis of saphenous vein 4/27/07    Left    Thyroid disease     hypothyroidism    Urinary incontinence        Past Surgical History:  Past Surgical History:   Procedure Laterality Date    HX CERVICAL DISKECTOMY  6/7/2016    HX COLONOSCOPY  approximately 2013    HX HYSTERECTOMY  approximately 1985    HX LUMBAR DISKECTOMY  2016    HX ORTHOPAEDIC  2006    right knee surgery       Family History:  Family History   Problem Relation Age of Onset    Hypertension Mother     Heart Disease Father 62    Hypertension Father     Alzheimer Father     Cancer Other         Unsure    Other Other         cerebral aneurysm       Social History:  Social History     Tobacco Use    Smoking status: Never Smoker    Smokeless tobacco: Never Used   Substance Use Topics    Alcohol use: No     Comment: rarely    Drug use: No       Allergies:  No Known Allergies      Review of Systems       Review of Systems   Constitutional: Negative for chills. Eyes: Negative for visual disturbance. Respiratory: Negative for shortness of breath. Cardiovascular: Negative for chest pain. Gastrointestinal: Positive for nausea. Negative for abdominal pain and vomiting. Genitourinary: Negative. Musculoskeletal: Negative for neck stiffness. Right side lateral rib pain   Skin: Negative for pallor and rash. Neurological: Positive for light-headedness. Negative for syncope, speech difficulty, weakness, numbness and headaches. Hematological: Does not bruise/bleed easily. Psychiatric/Behavioral: Negative for confusion and dysphoric mood. All other systems reviewed and are negative. Physical Exam     Visit Vitals  /65   Pulse (!) 58   Temp 98.1 °F (36.7 °C)   Resp 18   Wt 81.6 kg (180 lb)   SpO2 97%   BMI 28.19 kg/m²         Physical Exam  Vitals signs and nursing note reviewed. Constitutional:       Appearance: She is well-developed. She is not diaphoretic. Comments: Appears in pain   HENT:      Head: Normocephalic and atraumatic. Nose: Nose normal.   Eyes:      General: No scleral icterus. Conjunctiva/sclera: Conjunctivae normal.      Pupils: Pupils are equal, round, and reactive to light. Neck:      Musculoskeletal: Normal range of motion and neck supple. No muscular tenderness. Cardiovascular:      Rate and Rhythm: Normal rate. Heart sounds: Normal heart sounds. Comments: Capillary refill < 3 seconds  Pulmonary:      Effort: Pulmonary effort is normal. No respiratory distress. Breath sounds: Normal breath sounds.  No wheezing, rhonchi or rales. Comments: Right-sided lateral rib cage tenderness to palpation, no crepitance palpated  Abdominal:      General: Bowel sounds are normal. There is no distension. Palpations: Abdomen is soft. Tenderness: There is no tenderness. Musculoskeletal: Normal range of motion. General: No deformity. Comments: No hip tenderness pelvis stable  Has full range of motion head neck with no pain associated  No midline spinal tenderness  No arm or leg tenderness  No other bony tenderness  Has full range of motion bilateral upper and lower extremities   Lymphadenopathy:      Cervical: No cervical adenopathy. Skin:     General: Skin is warm and dry. Neurological:      General: No focal deficit present. Mental Status: She is alert and oriented to person, place, and time. Cranial Nerves: No cranial nerve deficit. Sensory: No sensory deficit. Motor: No weakness.       Coordination: Coordination normal.           Diagnostic Study Results     Labs -  Recent Results (from the past 12 hour(s))   EKG, 12 LEAD, INITIAL    Collection Time: 12/16/19  6:24 AM   Result Value Ref Range    Ventricular Rate 58 BPM    Atrial Rate 58 BPM    P-R Interval 170 ms    QRS Duration 98 ms    Q-T Interval 458 ms    QTC Calculation (Bezet) 449 ms    Calculated P Axis 19 degrees    Calculated R Axis 4 degrees    Calculated T Axis -3 degrees    Diagnosis       Sinus bradycardia  Otherwise normal ECG  When compared with ECG of 16-NOV-2016 07:30,  No significant change was found  Confirmed by Radha Knox ([id number]) on 12/16/2019 7:44:51 AM     CBC WITH AUTOMATED DIFF    Collection Time: 12/16/19  6:45 AM   Result Value Ref Range    WBC 7.4 4.6 - 13.2 K/uL    RBC 4.53 4.20 - 5.30 M/uL    HGB 13.1 12.0 - 16.0 g/dL    HCT 40.1 35.0 - 45.0 %    MCV 88.5 74.0 - 97.0 FL    MCH 28.9 24.0 - 34.0 PG    MCHC 32.7 31.0 - 37.0 g/dL    RDW 12.9 11.6 - 14.5 %    PLATELET 364 840 - 204 K/uL    MPV 10.2 9.2 - 11.8 FL    NEUTROPHILS 57 40 - 73 %    LYMPHOCYTES 26 21 - 52 %    MONOCYTES 13 (H) 3 - 10 %    EOSINOPHILS 3 0 - 5 %    BASOPHILS 1 0 - 2 %    ABS. NEUTROPHILS 4.2 1.8 - 8.0 K/UL    ABS. LYMPHOCYTES 2.0 0.9 - 3.6 K/UL    ABS. MONOCYTES 0.9 0.05 - 1.2 K/UL    ABS. EOSINOPHILS 0.2 0.0 - 0.4 K/UL    ABS.  BASOPHILS 0.1 0.0 - 0.1 K/UL    DF AUTOMATED     METABOLIC PANEL, BASIC    Collection Time: 12/16/19  6:45 AM   Result Value Ref Range    Sodium 144 136 - 145 mmol/L    Potassium 3.8 3.5 - 5.5 mmol/L    Chloride 109 100 - 111 mmol/L    CO2 31 21 - 32 mmol/L    Anion gap 4 3.0 - 18 mmol/L    Glucose 112 (H) 74 - 99 mg/dL    BUN 9 7.0 - 18 MG/DL    Creatinine 0.65 0.6 - 1.3 MG/DL    BUN/Creatinine ratio 14 12 - 20      GFR est AA >60 >60 ml/min/1.73m2    GFR est non-AA >60 >60 ml/min/1.73m2    Calcium 8.9 8.5 - 10.1 MG/DL   MAGNESIUM    Collection Time: 12/16/19  6:45 AM   Result Value Ref Range    Magnesium 2.4 1.6 - 2.6 mg/dL   CARDIAC PANEL,(CK, CKMB & TROPONIN)    Collection Time: 12/16/19  6:45 AM   Result Value Ref Range    CK 65 26 - 192 U/L    CK - MB 1.3 <3.6 ng/ml    CK-MB Index 2.0 0.0 - 4.0 %    Troponin-I, QT <0.02 0.0 - 0.045 NG/ML   TSH 3RD GENERATION    Collection Time: 12/16/19  6:45 AM   Result Value Ref Range    TSH 11.50 (H) 0.36 - 3.74 uIU/mL   URINALYSIS W/ RFLX MICROSCOPIC    Collection Time: 12/16/19  7:02 AM   Result Value Ref Range    Color YELLOW      Appearance CLEAR      Specific gravity 1.022 1.005 - 1.030      pH (UA) 6.0 5.0 - 8.0      Protein NEGATIVE  NEG mg/dL    Glucose NEGATIVE  NEG mg/dL    Ketone NEGATIVE  NEG mg/dL    Bilirubin NEGATIVE  NEG      Blood SMALL (A) NEG      Urobilinogen 1.0 0.2 - 1.0 EU/dL    Nitrites NEGATIVE  NEG      Leukocyte Esterase NEGATIVE  NEG     URINE MICROSCOPIC ONLY    Collection Time: 12/16/19  7:02 AM   Result Value Ref Range    WBC 0 to 3 0 - 4 /hpf    RBC 0 to 3 0 - 5 /hpf    Epithelial cells 1+ 0 - 5 /lpf       Radiologic Studies -   XR RIBS RT W PA CXR MIN 3 V    (Results Pending)   Per my preliminary read: Right side fifth and sixth rib fractures nondisplaced, possible seventh rib fracture  Cece Bowman DO        Medical Decision Making   I am the first provider for this patient. I reviewed the vital signs, available nursing notes, past medical history, past surgical history, family history and social history. Vital Signs-Reviewed the patient's vital signs. Records Reviewed: Nursing Notes and Old Medical Records (Time of Review: 7:08 AM)    Provider Notes (Medical Decision Making): DDX: Rib fractures, rib contusions, metabolic, orthostatic    Labs, x-ray ribs and chest, give analgesia    Elevated blood pressure likely associated with pain extremis    MDM    Medications   morphine injection 4 mg (4 mg IntraVENous Given 12/16/19 0742)   ondansetron (ZOFRAN) injection 4 mg (4 mg IntraVENous Given 12/16/19 0742)           ED Course: Progress Notes, Reevaluation, and Consults:    TSH elevated. We will have her follow-up with her PCP discussed this, and get repeat blood pressure check. Labs otherwise reassuring. Ortho follow-up given for rib fractures    I have reassessed the patient. I have discussed the workup, results and plan with the patient and patient is in agreement. Patient is feeling her. Patient will be prescribed Percocet, Zofran, MiraLAX, Narcan. Patient was discharge in stable condition. Patient was given outpatient follow up. Patient is to return to emergency department if any new or worsening condition. Discussed with patient and  regarding opioid use and benzos and the risks. And discussed purpose for the of Narcan          Diagnosis     Clinical Impression:   1. Multiple fractures of ribs, right side, initial encounter for closed fracture    2. Elevated TSH    3. History of hypothyroidism    4. Elevated blood pressure reading with diagnosis of hypertension    5. Nausea without vomiting    6.  Anxiety Disposition: Discharged    Follow-up Information     Follow up With Specialties Details Why Contact Info    Shelley Jones  Call today  [address] [facility] [address]    Beverly Verdin MD Internal Medicine Schedule an appointment as soon as possible for a visit in 2 days  [address] [address] [facility] [address] [phone] [address] [facility] EMERGENCY DEPT Emergency Medicine  As needed, If symptoms worsen [address] [facility]  [phone]           Patient's Medications   Start Taking    NALOXONE [facility]) 4 MG/ACTUATION NASAL SPRAY    Use 1 spray intranasally, then discard. Repeat with new spray every 2 min as needed for opioid overdose symptoms, alternating nostrils. Indications: decrease in rate & depth of breathing due to opioid drug, opioid overdose    ONDANSETRON HCL (ZOFRAN) 4 MG TABLET    Take 1 Tab by mouth every eight (8) hours as needed for Nausea. OXYCODONE-ACETAMINOPHEN (PERCOCET) 5-325 MG PER TABLET    Take 1 Tab by mouth every six (6) hours as needed for Pain for up to 5 days. Max Daily Amount: 4 Tabs. POLYETHYLENE GLYCOL (MIRALAX) 17 GRAM/DOSE POWDER    Take 17 g by mouth daily as needed (For constipation). 1 tablespoon with 8 oz of water daily   Continue Taking    AMLODIPINE (NORVASC) 5 MG TABLET    TAKE 1 TABLET EVERY DAY    AMLODIPINE (NORVASC) 5 MG TABLET    TAKE 1 TABLET EVERY DAY    BACLOFEN (LIORESAL) 10 MG TABLET    Take 1 Tab by mouth three (3) times daily. CETIRIZINE (ZYRTEC) 10 MG TABLET    Take  by mouth daily. CHOLECALCIFEROL, VITAMIN D3, 2,000 UNIT TAB    Take 2,000 Units by mouth daily. LEVOTHYROXINE (SYNTHROID) 112 MCG TABLET    TAKE 1 TABLET DAILY BEFORE BREAKFAST    LORAZEPAM (ATIVAN) 0.5 MG TABLET    Take 1 Tab by mouth every four (4) hours as needed for Anxiety. Max Daily Amount: 3 mg.     PHENTERMINE (ADIPEX-P) 37.5 MG TABLET    Take 1 Tab by mouth every morning. Max Daily Amount: 37.5 mg. PRAVASTATIN (PRAVACHOL) 40 MG TABLET    TAKE 1 TABLET NIGHTLY    SERTRALINE (ZOLOFT) 100 MG TABLET    TAKE 1 TABLET EVERY DAY    TOVIAZ 8 MG ER TABLET    Take 1 Tab by mouth daily. VITAMIN E (E GEMS) 1,000 UNIT CAPSULE    Take 1,000 Units by mouth daily. These Medications have changed    No medications on file   Stop Taking    No medications on file         DO Zoila Etienne medical dictation software was used for portions of this report. Unintended transcription errors may occur. My signature above authenticates this document and my orders, the final    diagnosis (es), discharge prescription (s), and instructions in the Epic    record.

## 2019-12-16 NOTE — ED TRIAGE NOTES
Patient states she was going down her stairs and missed the last step fell into the wall and railing and c/o pain to right rib cage laterally and to back. Denies hitting head, or LOC. She states she has had dizziness since her fall and some nausea.

## 2019-12-16 NOTE — DISCHARGE INSTRUCTIONS
Patient Education        If you were prescribed any medication take as directed. Do not drive or use heavy equipment if prescribed narcotics. Follow up with your primary care physician or with specialist as directed. Return to the emergency room with any new or worsening conditions. Hypothyroidism: Care Instructions  Your Care Instructions    You have hypothyroidism, which means that your body is not making enough thyroid hormone. This hormone helps your body use energy. If your thyroid level is low, you may feel tired, be constipated, have an increase in your blood pressure, or have dry skin or memory problems. You may also get cold easily, even when it is warm. Women with low thyroid levels may have heavy menstrual periods. A blood test to find your thyroid-stimulating hormone (TSH) level is used to check for hypothyroidism. A high TSH level may mean that you have low thyroid. When your body is not making enough thyroid hormone, TSH levels rise in an effort to make the body produce more. The treatment for hypothyroidism is to take thyroid hormone pills. You should start to feel better in 1 to 2 weeks. But it can take several months to see changes in the TSH level. You will need regular visits with your doctor to make sure you have the right dose of medicine. Most people need treatment for the rest of their lives. You will need to see your doctor regularly to have blood tests and to make sure you are doing well. Follow-up care is a key part of your treatment and safety. Be sure to make and go to all appointments, and call your doctor if you are having problems. It's also a good idea to know your test results and keep a list of the medicines you take. How can you care for yourself at home? · Take your thyroid hormone medicine exactly as prescribed. Call your doctor if you think you are having a problem with your medicine.  Most people do not have side effects if they take the right amount of medicine regularly. ? Take the medicine 30 minutes before breakfast, and do not take it with calcium, vitamins, or iron. ? Do not take extra doses of your thyroid medicine. It will not help you get better any faster, and it may cause side effects. ? If you forget to take a dose, do NOT take a double dose of medicine. Take your usual dose the next day. · Tell your doctor about all prescription, herbal, or over-the-counter products you take. · Take care of yourself. Eat a healthy diet, get enough sleep, and get regular exercise. When should you call for help? Call 911 anytime you think you may need emergency care. For example, call if:    · You passed out (lost consciousness).     · You have severe trouble breathing.     · You have a very slow heartbeat (less than 60 beats a minute).     · You have a low body temperature (95°F or below).    Call your doctor now or seek immediate medical care if:    · You feel tired, sluggish, or weak.     · You have trouble remembering things or concentrating.     · You do not begin to feel better 2 weeks after starting your medicine.    Watch closely for changes in your health, and be sure to contact your doctor if you have any problems. Where can you learn more? Go to http://dayna-mary jane.info/. Enter Y536 in the search box to learn more about \"Hypothyroidism: Care Instructions. \"  Current as of: November 6, 2018  Content Version: 12.2  © 4296-4148 [employer]. Care instructions adapted under license by [employer] (which disclaims liability or warranty for this information). If you have questions about a medical condition or this instruction, always ask your healthcare professional. [place] [address] any warranty or liability for your use of this information. Patient Education        Broken Rib: Care Instructions  Your Care Instructions    A broken rib is a crack or break in one of the bones of the rib cage. Breathing can be very painful because the muscles used for breathing pull on the rib. In most cases, a broken rib will heal on its own. You can take pain medicine while the rib mends. Pain relief allows you to take deep breaths. In the past, doctors recommended taping or wrapping broken ribs. This is no longer done because taping makes it hard for you to take deep breaths. Taking deep breaths may help prevent pneumonia or a partial collapse of a lung. Your rib will heal in about 6 weeks. You heal best when you take good care of yourself. Eat a variety of healthy foods, and don't smoke. Follow-up care is a key part of your treatment and safety. Be sure to make and go to all appointments, and call your doctor if you are having problems. It's also a good idea to know your test results and keep a list of the medicines you take. How can you care for yourself at home? · Be safe with medicines. Read and follow all instructions on the label. ? If the doctor gave you a prescription medicine for pain, take it as prescribed. ? If you are not taking a prescription pain medicine, ask your doctor if you can take an over-the-counter medicine. · Even if it hurts, try to cough or take the deepest breath you can at least once every hour. This will get air deeply into your lungs. This may reduce your chance of getting pneumonia or a partial collapse of a lung. Hold a pillow against your chest to make this less painful. · Put ice or a cold pack on the area for 10 to 20 minutes at a time. Put a thin cloth between the ice and your skin. When should you call for help? Call 911 anytime you think you may need emergency care.  For example, call if:    · You have severe trouble breathing.    Call your doctor now or seek immediate medical care if:    · You have some trouble breathing.     · You have a fever.     · You have a new or worse cough.    Watch closely for changes in your health, and be sure to contact your doctor if:    · You have pain even after taking your medicine.     · You do not get better as expected. Where can you learn more? Go to http://dayna-mary jane.info/. Enter M135 in the search box to learn more about \"Broken Rib: Care Instructions. \"  Current as of: June 26, 2019  Content Version: 12.2  © 1328-9342 [employer]. Care instructions adapted under license by [employer] (which disclaims liability or warranty for this information). If you have questions about a medical condition or this instruction, always ask your healthcare professional. [place] [address] any warranty or liability for your use of this information. Patient Education        Nausea and Vomiting: Care Instructions  Your Care Instructions    When you are nauseated, you may feel weak and sweaty and notice a lot of saliva in your mouth. Nausea often leads to vomiting. Most of the time you do not need to worry about nausea and vomiting, but they can be signs of other illnesses. Two common causes of nausea and vomiting are stomach flu and food poisoning. Nausea and vomiting from viral stomach flu will usually start to improve within 24 hours. Nausea and vomiting from food poisoning may last from 12 to 48 hours. The doctor has checked you carefully, but problems can develop later. If you notice any problems or new symptoms, get medical treatment right away. Follow-up care is a key part of your treatment and safety. Be sure to make and go to all appointments, and call your doctor if you are having problems. It's also a good idea to know your test results and keep a list of the medicines you take. How can you care for yourself at home? · To prevent dehydration, drink plenty of fluids, enough so that your urine is light yellow or clear like water. Choose water and other caffeine-free clear liquids until you feel better.  If you have kidney, heart, or liver disease and have to limit fluids, talk with your doctor before you increase the amount of fluids you drink. · Rest in bed until you feel better. · When you are able to eat, try clear soups, mild foods, and liquids until all symptoms are gone for 12 to 48 hours. Other good choices include dry toast, crackers, cooked cereal, and gelatin dessert, such as Jell-O. When should you call for help? Call 911 anytime you think you may need emergency care. For example, call if:    · You passed out (lost consciousness).    Call your doctor now or seek immediate medical care if:    · You have symptoms of dehydration, such as:  ? Dry eyes and a dry mouth. ? Passing only a little dark urine. ? Feeling thirstier than usual.     · You have new or worsening belly pain.     · You have a new or higher fever.     · You vomit blood or what looks like coffee grounds.    Watch closely for changes in your health, and be sure to contact your doctor if:    · You have ongoing nausea and vomiting.     · Your vomiting is getting worse.     · Your vomiting lasts longer than 2 days.     · You are not getting better as expected. Where can you learn more? Go to http://dayna-mary jane.info/. Enter 25 157149 in the search box to learn more about \"Nausea and Vomiting: Care Instructions. \"  Current as of: June 26, 2019  Content Version: 12.2  © 7549-7080 [employer]. Care instructions adapted under license by [employer] (which disclaims liability or warranty for this information). If you have questions about a medical condition or this instruction, always ask your healthcare professional. [place] [address] any warranty or liability for your use of this information. Patient Education        High Blood Pressure: Care Instructions  Overview    It's normal for blood pressure to go up and down throughout the day. But if it stays up, you have high blood pressure. Another name for high blood pressure is hypertension.   Despite what a lot of people think, high blood pressure usually doesn't cause headaches or make you feel dizzy or lightheaded. It usually has no symptoms. But it does increase your risk of stroke, heart attack, and other problems. You and your doctor will talk about your risks of these problems based on your blood pressure. Your doctor will give you a goal for your blood pressure. Your goal will be based on your health and your age. Lifestyle changes, such as eating healthy and being active, are always important to help lower blood pressure. You might also take medicine to reach your blood pressure goal.  Follow-up care is a key part of your treatment and safety. Be sure to make and go to all appointments, and call your doctor if you are having problems. It's also a good idea to know your test results and keep a list of the medicines you take. How can you care for yourself at home? Medical treatment  · If you stop taking your medicine, your blood pressure will go back up. You may take one or more types of medicine to lower your blood pressure. Be safe with medicines. Take your medicine exactly as prescribed. Call your doctor if you think you are having a problem with your medicine. · Talk to your doctor before you start taking aspirin every day. Aspirin can help certain people lower their risk of a heart attack or stroke. But taking aspirin isn't right for everyone, because it can cause serious bleeding. · See your doctor regularly. You may need to see the doctor more often at first or until your blood pressure comes down. · If you are taking blood pressure medicine, talk to your doctor before you take decongestants or anti-inflammatory medicine, such as ibuprofen. Some of these medicines can raise blood pressure. · Learn how to check your blood pressure at home. Lifestyle changes  · Stay at a healthy weight.  This is especially important if you put on weight around the waist. Losing even 10 pounds can help you lower your blood pressure. · If your doctor recommends it, get more exercise. Walking is a good choice. Bit by bit, increase the amount you walk every day. Try for at least 30 minutes on most days of the week. You also may want to swim, bike, or do other activities. · Avoid or limit alcohol. Talk to your doctor about whether you can drink any alcohol. · Try to limit how much sodium you eat to less than 2,300 milligrams (mg) a day. Your doctor may ask you to try to eat less than 1,500 mg a day. · Eat plenty of fruits (such as bananas and oranges), vegetables, legumes, whole grains, and low-fat dairy products. · Lower the amount of saturated fat in your diet. Saturated fat is found in animal products such as milk, cheese, and meat. Limiting these foods may help you lose weight and also lower your risk for heart disease. · Do not smoke. Smoking increases your risk for heart attack and stroke. If you need help quitting, talk to your doctor about stop-smoking programs and medicines. These can increase your chances of quitting for good. When should you call for help? Call  911 anytime you think you may need emergency care. This may mean having symptoms that suggest that your blood pressure is causing a serious heart or blood vessel problem. Your blood pressure may be over 180/120.   For example, call  911 if:    · You have symptoms of a heart attack. These may include:  ? Chest pain or pressure, or a strange feeling in the chest.  ? Sweating. ? Shortness of breath. ? Nausea or vomiting. ? Pain, pressure, or a strange feeling in the back, neck, jaw, or upper belly or in one or both shoulders or arms. ? Lightheadedness or sudden weakness. ? A fast or irregular heartbeat.     · You have symptoms of a stroke. These may include:  ? Sudden numbness, tingling, weakness, or loss of movement in your face, arm, or leg, especially on only one side of your body. ? Sudden vision changes. ? Sudden trouble speaking.   ? Sudden confusion or trouble understanding simple statements. ? Sudden problems with walking or balance. ? A sudden, severe headache that is different from past headaches.     · You have severe back or belly pain.    Do not wait until your blood pressure comes down on its own. Get help right away.   Call your doctor now or seek immediate care if:    · Your blood pressure is much higher than normal (such as 180/120 or higher), but you don't have symptoms.     · You think high blood pressure is causing symptoms, such as:  ? Severe headache.  ? Blurry vision.    Watch closely for changes in your health, and be sure to contact your doctor if:    · Your blood pressure measures higher than your doctor recommends at least 2 times. That means the top number is higher or the bottom number is higher, or both.     · You think you may be having side effects from your blood pressure medicine. Where can you learn more? Go to http://dayna-mary jane.info/. Enter C312 in the search box to learn more about \"High Blood Pressure: Care Instructions. \"  Current as of: April 9, 2019  Content Version: 12.2  © 1119-3954 [employer]. Care instructions adapted under license by [employer] (which disclaims liability or warranty for this information). If you have questions about a medical condition or this instruction, always ask your healthcare professional. [address] any warranty or liability for your use of this information.

## 2019-12-16 NOTE — ED NOTES
Bedside Hand-off-Report given to oncoming shift RN Glen Nayak), including  all care previously given. Addressed all questions asked by oncoming RN.

## 2019-12-16 NOTE — ED NOTES
Patient instructed of need for a clean catch urine specimen. Patient verbalized understanding of. Cleansing wipes and sterile urine cup provided. Patient denies need to oid at this time and states \"I just went\".

## 2019-12-24 ENCOUNTER — OFFICE VISIT (OUTPATIENT)
Dept: INTERNAL MEDICINE CLINIC | Age: 69
End: 2019-12-24

## 2019-12-24 VITALS
HEART RATE: 74 BPM | RESPIRATION RATE: 12 BRPM | OXYGEN SATURATION: 98 % | WEIGHT: 186 LBS | DIASTOLIC BLOOD PRESSURE: 82 MMHG | BODY MASS INDEX: 29.19 KG/M2 | SYSTOLIC BLOOD PRESSURE: 152 MMHG | TEMPERATURE: 97.6 F | HEIGHT: 67 IN

## 2019-12-24 DIAGNOSIS — S22.41XA CLOSED FRACTURE OF MULTIPLE RIBS OF RIGHT SIDE, INITIAL ENCOUNTER: Primary | ICD-10-CM

## 2019-12-24 DIAGNOSIS — E03.9 ACQUIRED HYPOTHYROIDISM: ICD-10-CM

## 2019-12-24 RX ORDER — IBUPROFEN 200 MG
400 TABLET ORAL
COMMUNITY

## 2019-12-24 NOTE — PROGRESS NOTES
Raoul East presents today for   Chief Complaint   Patient presents with   [facility] ED Follow-up     [facility] 12-16-19 for FAll, Ribs Fractures 5th, 6th, Elevated TSH       The patient states for the past year she has trouble lifting the Left Leg, it feels weak. Is someone accompanying this pt? no    Is the patient using any DME equipment during [address]? no    Depression Screening:  3 most recent PHQ Screens 12/24/2019   Little interest or pleasure in doing things Not at all   Feeling down, depressed, irritable, or hopeless Not at all   Total Score PHQ 2 0       Learning Assessment:  Learning Assessment 12/24/2019   PRIMARY LEARNER Patient   HIGHEST LEVEL OF EDUCATION - PRIMARY LEARNER  GRADUATED HIGH SCHOOL OR GED   BARRIERS PRIMARY LEARNER NONE   CO-LEARNER CAREGIVER No   PRIMARY LANGUAGE ENGLISH   LEARNER PREFERENCE PRIMARY DEMONSTRATION   ANSWERED BY patient   RELATIONSHIP SELF       Abuse Screening:  Abuse Screening Questionnaire 12/24/2019   Do you ever feel afraid of your partner? N   Are you in a relationship with someone who physically or mentally threatens you? N   Is it safe for you to go home? Y       Fall Risk  Fall Risk Assessment, last 12 mths 12/24/2019   Able to walk? Yes   Fall in past 12 months? Yes   Fall with injury? Yes   Number of falls in past 12 months 3   Fall Risk Score 4       Health Maintenance reviewed and discussed and ordered per Provider. Coordination of Care:  1. Have you been to the ER, urgent care clinic since your last visit? Hospitalized since your last visit? Yes When: 12-16-19 Where: [facility] Reason: Fall, Ribs Fractures 5th, 6th, Elevated TSH. 2. Have you seen or consulted any other health care providers outside of the [address] [place] [address] [place] since your last visit? Include any pap smears or colon screening.  no

## 2019-12-24 NOTE — PROGRESS NOTES
Logan Lockwood 1950, is a 68 y.o. female, who is seen today for recent fractured ribs, elevated TSH. At 4 AM several days ago she was up in her home and was going downstairs in the dark and missed 1 of the last steps causing her to fall into the railing with her right ribs and she fractured 3 ribs. She had a great deal of pain and tenderness and was taken to the emergency room and found to have fracture of right ribs 6 7 and 8, she is getting adequate control pain with ibuprofen 400 mg at a time up to every 4-6 hours. She also had an elevated TSH of 11.5 at that time, not sure why that was even checked. TSH was 3.9 at a routine checkup in August.  She does tell me that she is sure that sometimes she misses doses of thyroid but she did not think she had missed any of the week before   Her fall. Past Medical History:   Diagnosis Date    Allergic rhinitis     Atrophic vaginitis     Back pain     Cystitis     Epigastric pain     Headache(784.0)     migraine    Hypercholesterolemia     Hypertension     Neck pain     Numbness of arm 12/18/06    left    LOREN (obstructive sleep apnea)     no cpap    Phlebitis of saphenous vein 4/27/07    Left    Thyroid disease     hypothyroidism    Urinary incontinence      Current Outpatient Medications   Medication Sig Dispense Refill    ibuprofen (MOTRIN) 200 mg tablet Take 400 mg by mouth every six (6) hours as needed for Pain.  ondansetron hcl (ZOFRAN) 4 mg tablet Take 1 Tab by mouth every eight (8) hours as needed for Nausea. 7 Tab 0    polyethylene glycol (MIRALAX) 17 gram/dose powder Take 17 g by mouth daily as needed (For constipation). 1 tablespoon with 8 oz of water daily 235 g 0    naloxone (NARCAN) 4 mg/actuation nasal spray Use 1 spray intranasally, then discard. Repeat with new spray every 2 min as needed for opioid overdose symptoms, alternating nostrils.   Indications: decrease in rate & depth of breathing due to opioid drug, opioid overdose 2 Each 0    levothyroxine (SYNTHROID) 112 mcg tablet TAKE 1 TABLET DAILY BEFORE BREAKFAST 90 Tab 3    sertraline (ZOLOFT) 100 mg tablet TAKE 1 TABLET EVERY DAY 90 Tab 3    pravastatin (PRAVACHOL) 40 mg tablet TAKE 1 TABLET NIGHTLY 90 Tab 3    amLODIPine (NORVASC) 5 mg tablet TAKE 1 TABLET EVERY DAY 90 Tab 3    cholecalciferol, vitamin D3, 2,000 unit tab Take 2,000 Units by mouth daily.  vitamin e (E GEMS) 1,000 unit capsule Take 1,000 Units by mouth daily. Visit Vitals  /82 (BP 1 Location: Left arm, BP Patient Position: Sitting)   Pulse 74   Temp 97.6 °F (36.4 °C) (Oral)   Resp 12   Ht 5' 7\" (1.702 m)   Wt 186 lb (84.4 kg)   SpO2 98%   BMI 29.13 kg/m²     Lungs are clear to percussion. Good breath sounds with no crepitation, she has tiny bruises in the posterior lateral right ribs and I did not press on those areas because they are very tender. Heart reveals no murmur gallop click or rub. Extremities reveal no clubbing cyanosis or edema. Assessment: #1. Closed fracture of right ribs 6 7 and 8 from a recent fall, the pain is already getting a little better and she is using ibuprofen, that gives her adequate relief. I have told her that over the next couple of weeks she is likely to see improvement in the 3 weeks after that considerable improvement or resolution of pain is likely. She will avoid falls by walking very carefully particularly with these fractured ribs she does not want to reinjure them. #2.  Elevated TSH, she clearly has missed some doses of thyroid medicine, TSH was normal in August, she will be extra careful to take her thyroid pills every day and we will recheck thyroid level along with other lab in early March as previously planned. #3.  Blood pressure is higher than usual but she has been in some pain in the last several days after her fall, we will be rechecking blood pressure in about 2-1/2 months.     Follow-up as planned in about 2 and half months with lab. This visit lasted 25 minutes, greater than 50% of the time spent counseling on the expected timeframe for healing of the ribs and resolution of pain as well as the importance of being sure to take her thyroid medicine regularly. I also talked to her at length about the fact that she cannot easily right herself as quickly now that she is older and she needs to slow down little bit and turn lights on if needed she will not be is likely to fall. Corona Torres MD FACP    Please note: This document has been produced using voice recognition software. Unrecognized errors in transcription may be present.

## 2020-03-24 ENCOUNTER — TELEPHONE (OUTPATIENT)
Dept: INTERNAL MEDICINE CLINIC | Age: 70
End: 2020-03-24

## 2020-03-24 NOTE — TELEPHONE ENCOUNTER
Pt c/o cough with mucous for about 2 weeks.  has it and is worse and he passed it to her. No fever that she knows of but does have sore throat. No sob or wheezing, no diarrhea. She has used OTC meds. No asthma, copd or lung disease. She said she had same sort of thing 6 months ago and a Zakia Cagle helped her.   Please advise her at [phone]

## 2020-04-10 RX ORDER — PRAVASTATIN SODIUM 40 MG/1
TABLET ORAL
Qty: 90 TAB | Refills: 3 | Status: SHIPPED | OUTPATIENT
Start: 2020-04-10 | End: 2021-02-03 | Stop reason: ALTCHOICE

## 2020-04-10 RX ORDER — SERTRALINE HYDROCHLORIDE 100 MG/1
TABLET, FILM COATED ORAL
Qty: 90 TAB | Refills: 3 | Status: SHIPPED | OUTPATIENT
Start: 2020-04-10 | End: 2021-02-03 | Stop reason: SDUPTHER

## 2020-04-10 RX ORDER — AMLODIPINE BESYLATE 5 MG/1
TABLET ORAL
Qty: 90 TAB | Refills: 3 | Status: SHIPPED | OUTPATIENT
Start: 2020-04-10 | End: 2021-02-03 | Stop reason: DRUGHIGH

## 2020-04-10 RX ORDER — LEVOTHYROXINE SODIUM 112 UG/1
TABLET ORAL
Qty: 90 TAB | Refills: 3 | Status: SHIPPED | OUTPATIENT
Start: 2020-04-10 | End: 2021-02-03 | Stop reason: SDUPTHER

## 2020-06-12 ENCOUNTER — TELEPHONE (OUTPATIENT)
Dept: INTERNAL MEDICINE CLINIC | Age: 70
End: 2020-06-12

## 2020-06-12 NOTE — TELEPHONE ENCOUNTER
Yusuf Dumont MD  [facility] 44 minutes ago (10:20 AM)      I recommend that she go to [facility].     Routing comment

## 2020-06-12 NOTE — TELEPHONE ENCOUNTER
Patient asking to be seen says she has sore throat, low grade fever and not able to taste anything, asking if she should go to the [facility] to get tested for Covid

## 2020-06-12 NOTE — TELEPHONE ENCOUNTER
Pt aware of message below and verbalized understanding. She will go to [facility] for further evaluation. No further questions or concerns from pt at this time.

## 2020-07-01 ENCOUNTER — TELEPHONE (OUTPATIENT)
Dept: INTERNAL MEDICINE CLINIC | Age: 70
End: 2020-07-01

## 2020-07-01 NOTE — TELEPHONE ENCOUNTER
Pt calling saying she is having a very bad pain in her left side. Started Sunday but getting worse. Moving to front of her body and down to her private area. Thinks it may be a kidney. Wants to know if you can see her or what she should do.  Says pain is awful

## 2020-07-01 NOTE — TELEPHONE ENCOUNTER
Pt returning call. Says she just looked at her phone and saw the messages.  Is there a time tomorrow you can fit her in?

## 2020-07-02 ENCOUNTER — OFFICE VISIT (OUTPATIENT)
Dept: INTERNAL MEDICINE CLINIC | Age: 70
End: 2020-07-02

## 2020-07-02 VITALS
RESPIRATION RATE: 12 BRPM | OXYGEN SATURATION: 97 % | SYSTOLIC BLOOD PRESSURE: 152 MMHG | HEART RATE: 75 BPM | TEMPERATURE: 99.1 F | WEIGHT: 181.8 LBS | HEIGHT: 67 IN | BODY MASS INDEX: 28.53 KG/M2 | DIASTOLIC BLOOD PRESSURE: 85 MMHG

## 2020-07-02 DIAGNOSIS — R30.0 BURNING WITH URINATION: Primary | ICD-10-CM

## 2020-07-02 DIAGNOSIS — N23 RENAL COLIC ON LEFT SIDE: ICD-10-CM

## 2020-07-02 LAB
BILIRUB UR QL STRIP: NORMAL
GLUCOSE UR-MCNC: NEGATIVE MG/DL
KETONES P FAST UR STRIP-MCNC: NORMAL MG/DL
PH UR STRIP: 7 [PH] (ref 4.6–8)
PROT UR QL STRIP: NORMAL
SP GR UR STRIP: 1.02 (ref 1–1.03)
UA UROBILINOGEN AMB POC: NORMAL (ref 0.2–1)
URINALYSIS CLARITY POC: NORMAL
URINALYSIS COLOR POC: NORMAL
URINE BLOOD POC: NORMAL
URINE LEUKOCYTES POC: NEGATIVE
URINE NITRITES POC: NEGATIVE

## 2020-07-02 RX ORDER — TAMSULOSIN HYDROCHLORIDE 0.4 MG/1
0.4 CAPSULE ORAL DAILY
Qty: 30 CAP | Refills: 0 | Status: SHIPPED | OUTPATIENT
Start: 2020-07-02 | End: 2021-02-03 | Stop reason: ALTCHOICE

## 2020-07-02 RX ORDER — OXYCODONE AND ACETAMINOPHEN 10; 325 MG/1; MG/1
1 TABLET ORAL
Qty: 30 TAB | Refills: 0 | Status: SHIPPED | OUTPATIENT
Start: 2020-07-02 | End: 2020-07-09

## 2020-07-02 NOTE — PROGRESS NOTES
Cora Farley 1950, is a 65 y.o. female, who is seen today for back and abdominal pain. 4 evenings ago she suddenly developed sharp pain in the left CVA area that kept her awake most of the night with sharp jabbing pain associated with some nausea. Into the next day and the day after it seemed to also be felt in the left lower abdomen pelvis and even almost to the genital region. No dysuria. No blood in her urine. Never had a kidney stone before. Still having some nausea though pain is subsided a little bit. Past Medical History:   Diagnosis Date    Allergic rhinitis     Atrophic vaginitis     Back pain     Cystitis     Epigastric pain     Headache(784.0)     migraine    Hypercholesterolemia     Hypertension     Neck pain     Numbness of arm 12/18/06    left    LOREN (obstructive sleep apnea)     no cpap    Phlebitis of saphenous vein 4/27/07    Left    Thyroid disease     hypothyroidism    Urinary incontinence      Current Outpatient Medications   Medication Sig Dispense Refill    tamsulosin (Flomax) 0.4 mg capsule Take 1 Cap by mouth daily. 30 Cap 0    oxyCODONE-acetaminophen (PERCOCET 10)  mg per tablet Take 1 Tab by mouth every four (4) hours as needed for Pain for up to 7 days. Max Daily Amount: 6 Tabs. 30 Tab 0    sertraline (ZOLOFT) 100 mg tablet TAKE 1 TABLET EVERY DAY 90 Tab 3    pravastatin (PRAVACHOL) 40 mg tablet TAKE 1 TABLET EVERY NIGHT 90 Tab 3    amLODIPine (NORVASC) 5 mg tablet TAKE 1 TABLET EVERY DAY 90 Tab 3    levothyroxine (SYNTHROID) 112 mcg tablet TAKE 1 TABLET DAILY BEFORE BREAKFAST 90 Tab 3    ibuprofen (MOTRIN) 200 mg tablet Take 400 mg by mouth every six (6) hours as needed for Pain.  ondansetron hcl (ZOFRAN) 4 mg tablet Take 1 Tab by mouth every eight (8) hours as needed for Nausea. 7 Tab 0    polyethylene glycol (MIRALAX) 17 gram/dose powder Take 17 g by mouth daily as needed (For constipation).  1 tablespoon with 8 oz of water daily 235 g 0    naloxone (NARCAN) 4 mg/actuation nasal spray Use 1 spray intranasally, then discard. Repeat with new spray every 2 min as needed for opioid overdose symptoms, alternating nostrils. Indications: decrease in rate & depth of breathing due to opioid drug, opioid overdose 2 Each 0    cholecalciferol, vitamin D3, 2,000 unit tab Take 2,000 Units by mouth daily.  vitamin e (E GEMS) 1,000 unit capsule Take 1,000 Units by mouth daily. Visit Vitals  /85 (BP 1 Location: Right arm, BP Patient Position: Sitting)   Pulse 75   Temp 99.1 °F (37.3 °C) (Oral)   Resp 12   Ht 5' 7\" (1.702 m)   Wt 181 lb 12.8 oz (82.5 kg)   SpO2 97%   BMI 28.47 kg/m²     There is minimal if any CVA tenderness to ballottement. No abdominal tenderness. No rash. Forward flexion and side to side movement of the torso produces no discomfort. She has full range of motion. Results for orders placed or performed in visit on 07/02/20   AMB POC URINALYSIS DIP STICK AUTO W/O MICRO   Result Value Ref Range    Color (UA POC) Dark Yellow     Clarity (UA POC) Slightly Cloudy     Glucose (UA POC) Negative Negative    Bilirubin (UA POC) 1+ Negative    Ketones (UA POC) Trace Negative    Specific gravity (UA POC) 1.025 1.001 - 1.035    Blood (UA POC) 1+ Negative    pH (UA POC) 7.0 4.6 - 8.0    Protein (UA POC) Trace Negative    Urobilinogen (UA POC) 1 mg/dL 0.2 - 1    Nitrites (UA POC) Negative Negative    Leukocyte esterase (UA POC) Negative Negative     Assessment: Almost certainly she has a kidney stone with renal colic, will treat with plenty of fluid intake, Flomax daily and Percocet as needed. If the pain gets worse or is still occurring after the holiday will order a CT which we discussed at some length today. She prefers not to do that right now.   This visit lasted 25 minutes, greater than 50% of the time was spent  counseling, going over the pathophysiology of the issue and why I do not think it is anything else causing the pain and what our plan is for the future including medicine for the next few days and CT if needed and that other modalities are not as good as CT. Corona Montaño MD FACP    Please note: This document has been produced using voice recognition software. Unrecognized errors in transcription may be present.

## 2020-07-13 DIAGNOSIS — I10 PRIMARY HYPERTENSION: ICD-10-CM

## 2020-07-13 DIAGNOSIS — E03.9 ACQUIRED HYPOTHYROIDISM: Primary | ICD-10-CM

## 2020-07-14 ENCOUNTER — APPOINTMENT (OUTPATIENT)
Dept: INTERNAL MEDICINE CLINIC | Age: 70
End: 2020-07-14

## 2020-07-15 LAB
ALBUMIN SERPL-MCNC: 4.1 G/DL (ref 3.7–4.7)
ALBUMIN/GLOB SERPL: 1.6 {RATIO} (ref 1.2–2.2)
ALP SERPL-CCNC: 67 IU/L (ref 39–117)
ALT SERPL-CCNC: 11 IU/L (ref 0–32)
AST SERPL-CCNC: 14 IU/L (ref 0–40)
BILIRUB SERPL-MCNC: 0.5 MG/DL (ref 0–1.2)
BUN SERPL-MCNC: 13 MG/DL (ref 8–27)
BUN/CREAT SERPL: 16 (ref 12–28)
CALCIUM SERPL-MCNC: 9.4 MG/DL (ref 8.7–10.3)
CHLORIDE SERPL-SCNC: 106 MMOL/L (ref 96–106)
CHOLEST SERPL-MCNC: 189 MG/DL (ref 100–199)
CO2 SERPL-SCNC: 26 MMOL/L (ref 20–29)
CREAT SERPL-MCNC: 0.81 MG/DL (ref 0.57–1)
GLOBULIN SER CALC-MCNC: 2.5 G/DL (ref 1.5–4.5)
GLUCOSE SERPL-MCNC: 95 MG/DL (ref 65–99)
HDLC SERPL-MCNC: 46 MG/DL
INTERPRETATION, 910389: NORMAL
LDLC SERPL CALC-MCNC: 122 MG/DL (ref 0–99)
POTASSIUM SERPL-SCNC: 4.6 MMOL/L (ref 3.5–5.2)
PROT SERPL-MCNC: 6.6 G/DL (ref 6–8.5)
SODIUM SERPL-SCNC: 146 MMOL/L (ref 134–144)
T4 FREE SERPL-MCNC: 1.22 NG/DL (ref 0.82–1.77)
TRIGL SERPL-MCNC: 104 MG/DL (ref 0–149)
TSH SERPL DL<=0.005 MIU/L-ACNC: 4.33 UIU/ML (ref 0.45–4.5)
VLDLC SERPL CALC-MCNC: 21 MG/DL (ref 5–40)

## 2020-07-30 ENCOUNTER — PATIENT MESSAGE (OUTPATIENT)
Dept: INTERNAL MEDICINE CLINIC | Age: 70
End: 2020-07-30

## 2020-07-30 NOTE — TELEPHONE ENCOUNTER
----- Message from ELDON Lam Passer sent at 7/30/2020  3:40 PM EDT -----  Regarding: Non-Urgent Medical Question  Contact: [phone]  Dr. Mabel Hernandez, my Mom and I were unable to keep our appointment last Wednesday. We were told to go get tested. We are negative. We just want to say how much you will missed but please enjoy your MCC. We are in the process of finding a new Physician but we know how hard this process is going to be. We will always  everyone and compare them to you!     With all our love,  The [address]

## 2020-12-29 ENCOUNTER — TELEPHONE (OUTPATIENT)
Dept: INTERNAL MEDICINE CLINIC | Age: 70
End: 2020-12-29

## 2020-12-29 DIAGNOSIS — E03.9 ACQUIRED HYPOTHYROIDISM: ICD-10-CM

## 2020-12-29 DIAGNOSIS — I10 PRIMARY HYPERTENSION: Primary | ICD-10-CM

## 2020-12-29 DIAGNOSIS — E78.5 HYPERLIPIDEMIA LDL GOAL <130: ICD-10-CM

## 2021-01-29 ENCOUNTER — APPOINTMENT (OUTPATIENT)
Dept: INTERNAL MEDICINE CLINIC | Age: 71
End: 2021-01-29

## 2021-01-29 DIAGNOSIS — E78.5 HYPERLIPIDEMIA LDL GOAL <130: ICD-10-CM

## 2021-01-29 DIAGNOSIS — E03.9 ACQUIRED HYPOTHYROIDISM: ICD-10-CM

## 2021-01-29 DIAGNOSIS — I10 PRIMARY HYPERTENSION: ICD-10-CM

## 2021-01-30 LAB
ALBUMIN SERPL-MCNC: 4.3 G/DL (ref 3.7–4.7)
ALBUMIN/GLOB SERPL: 1.7 {RATIO} (ref 1.2–2.2)
ALP SERPL-CCNC: 77 IU/L (ref 39–117)
ALT SERPL-CCNC: 10 IU/L (ref 0–32)
AST SERPL-CCNC: 16 IU/L (ref 0–40)
BILIRUB SERPL-MCNC: 0.5 MG/DL (ref 0–1.2)
BUN SERPL-MCNC: 10 MG/DL (ref 8–27)
BUN/CREAT SERPL: 14 (ref 12–28)
CALCIUM SERPL-MCNC: 9.6 MG/DL (ref 8.7–10.3)
CHLORIDE SERPL-SCNC: 104 MMOL/L (ref 96–106)
CHOLEST SERPL-MCNC: 201 MG/DL (ref 100–199)
CO2 SERPL-SCNC: 27 MMOL/L (ref 20–29)
CREAT SERPL-MCNC: 0.73 MG/DL (ref 0.57–1)
GLOBULIN SER CALC-MCNC: 2.5 G/DL (ref 1.5–4.5)
GLUCOSE SERPL-MCNC: 94 MG/DL (ref 65–99)
HDLC SERPL-MCNC: 50 MG/DL
INTERPRETATION, 910389: NORMAL
LDLC SERPL CALC-MCNC: 135 MG/DL (ref 0–99)
POTASSIUM SERPL-SCNC: 4.2 MMOL/L (ref 3.5–5.2)
PROT SERPL-MCNC: 6.8 G/DL (ref 6–8.5)
SODIUM SERPL-SCNC: 143 MMOL/L (ref 134–144)
T4 FREE SERPL-MCNC: 1.35 NG/DL (ref 0.82–1.77)
TRIGL SERPL-MCNC: 89 MG/DL (ref 0–149)
TSH SERPL DL<=0.005 MIU/L-ACNC: 1.71 UIU/ML (ref 0.45–4.5)
VLDLC SERPL CALC-MCNC: 16 MG/DL (ref 5–40)

## 2021-02-03 ENCOUNTER — OFFICE VISIT (OUTPATIENT)
Dept: INTERNAL MEDICINE CLINIC | Age: 71
End: 2021-02-03
Payer: MEDICARE

## 2021-02-03 VITALS
HEART RATE: 68 BPM | TEMPERATURE: 97.3 F | HEIGHT: 66 IN | OXYGEN SATURATION: 99 % | SYSTOLIC BLOOD PRESSURE: 155 MMHG | RESPIRATION RATE: 14 BRPM | BODY MASS INDEX: 29.12 KG/M2 | DIASTOLIC BLOOD PRESSURE: 73 MMHG | WEIGHT: 181.2 LBS

## 2021-02-03 DIAGNOSIS — Z13.31 SCREENING FOR DEPRESSION: ICD-10-CM

## 2021-02-03 DIAGNOSIS — M72.2 PLANTAR FASCIITIS, BILATERAL: ICD-10-CM

## 2021-02-03 DIAGNOSIS — E03.9 ACQUIRED HYPOTHYROIDISM: ICD-10-CM

## 2021-02-03 DIAGNOSIS — R23.8 SKIN IRRITATION: ICD-10-CM

## 2021-02-03 DIAGNOSIS — N32.81 OVERACTIVE BLADDER: ICD-10-CM

## 2021-02-03 DIAGNOSIS — I10 PRIMARY HYPERTENSION: ICD-10-CM

## 2021-02-03 DIAGNOSIS — Z63.6 CAREGIVER STRESS: ICD-10-CM

## 2021-02-03 DIAGNOSIS — E78.5 HYPERLIPIDEMIA LDL GOAL <130: ICD-10-CM

## 2021-02-03 DIAGNOSIS — Z76.89 ENCOUNTER TO ESTABLISH CARE WITH NEW DOCTOR: ICD-10-CM

## 2021-02-03 DIAGNOSIS — Z79.899 MEDICATION MANAGEMENT: ICD-10-CM

## 2021-02-03 DIAGNOSIS — Z00.00 MEDICARE ANNUAL WELLNESS VISIT, SUBSEQUENT: Primary | ICD-10-CM

## 2021-02-03 DIAGNOSIS — F32.5 MAJOR DEPRESSION IN COMPLETE REMISSION (HCC): ICD-10-CM

## 2021-02-03 DIAGNOSIS — Z13.6 SCREENING FOR ISCHEMIC HEART DISEASE: ICD-10-CM

## 2021-02-03 DIAGNOSIS — Z00.00 HEALTHCARE MAINTENANCE: ICD-10-CM

## 2021-02-03 PROCEDURE — G0439 PPPS, SUBSEQ VISIT: HCPCS | Performed by: NURSE PRACTITIONER

## 2021-02-03 PROCEDURE — G8427 DOCREV CUR MEDS BY ELIG CLIN: HCPCS | Performed by: NURSE PRACTITIONER

## 2021-02-03 PROCEDURE — 1090F PRES/ABSN URINE INCON ASSESS: CPT | Performed by: NURSE PRACTITIONER

## 2021-02-03 PROCEDURE — 3017F COLORECTAL CA SCREEN DOC REV: CPT | Performed by: NURSE PRACTITIONER

## 2021-02-03 PROCEDURE — G8753 SYS BP > OR = 140: HCPCS | Performed by: NURSE PRACTITIONER

## 2021-02-03 PROCEDURE — G9717 DOC PT DX DEP/BP F/U NT REQ: HCPCS | Performed by: NURSE PRACTITIONER

## 2021-02-03 PROCEDURE — G8399 PT W/DXA RESULTS DOCUMENT: HCPCS | Performed by: NURSE PRACTITIONER

## 2021-02-03 PROCEDURE — 99215 OFFICE O/P EST HI 40 MIN: CPT | Performed by: NURSE PRACTITIONER

## 2021-02-03 PROCEDURE — G8419 CALC BMI OUT NRM PARAM NOF/U: HCPCS | Performed by: NURSE PRACTITIONER

## 2021-02-03 PROCEDURE — 1101F PT FALLS ASSESS-DOCD LE1/YR: CPT | Performed by: NURSE PRACTITIONER

## 2021-02-03 PROCEDURE — G8536 NO DOC ELDER MAL SCRN: HCPCS | Performed by: NURSE PRACTITIONER

## 2021-02-03 PROCEDURE — G0444 DEPRESSION SCREEN ANNUAL: HCPCS | Performed by: NURSE PRACTITIONER

## 2021-02-03 PROCEDURE — G8754 DIAS BP LESS 90: HCPCS | Performed by: NURSE PRACTITIONER

## 2021-02-03 RX ORDER — SIMVASTATIN 20 MG/1
20 TABLET, FILM COATED ORAL
Qty: 90 TAB | Refills: 1 | Status: SHIPPED | OUTPATIENT
Start: 2021-02-03 | End: 2021-07-27 | Stop reason: DRUGHIGH

## 2021-02-03 RX ORDER — SERTRALINE HYDROCHLORIDE 50 MG/1
50 TABLET, FILM COATED ORAL DAILY
Qty: 90 TAB | Refills: 1 | Status: SHIPPED | OUTPATIENT
Start: 2021-02-03 | End: 2021-07-27

## 2021-02-03 RX ORDER — AMLODIPINE BESYLATE 10 MG/1
10 TABLET ORAL DAILY
Qty: 90 TAB | Refills: 1 | Status: SHIPPED | OUTPATIENT
Start: 2021-02-03 | End: 2021-07-27 | Stop reason: SDUPTHER

## 2021-02-03 RX ORDER — SERTRALINE HYDROCHLORIDE 100 MG/1
100 TABLET, FILM COATED ORAL DAILY
Qty: 90 TAB | Refills: 1 | Status: SHIPPED | OUTPATIENT
Start: 2021-02-03 | End: 2021-07-27 | Stop reason: SDUPTHER

## 2021-02-03 RX ORDER — LEVOTHYROXINE SODIUM 112 UG/1
112 TABLET ORAL
Qty: 90 TAB | Refills: 1 | Status: SHIPPED | OUTPATIENT
Start: 2021-02-03 | End: 2021-07-27 | Stop reason: SDUPTHER

## 2021-02-03 SDOH — SOCIAL STABILITY - SOCIAL INSECURITY: DEPENDENT RELATIVE NEEDING CARE AT HOME: Z63.6

## 2021-02-03 NOTE — PROGRESS NOTES
Chief Complaint   Patient presents with   [address]     done 1/29/21       1. Have you been to the ER, urgent care clinic since your last visit? Hospitalized since your last visit? No    2. Have you seen or consulted any other health care providers outside of the [address] [place] [address] since your last visit? Include any pap smears or colon screening.  No.

## 2021-02-03 NOTE — PROGRESS NOTES
[facility] [address], [address]  [phone] [facility]/[phone] fax    2/3/2021    HPI:   Aiden Sifuentes 1950 is a pleasant WHITE [place]  female who presents today to establish care and for routine physical exam. She is  with 10 adult children, 17 grandchildren, and 5 great grandchildren. Hypothyroidism: Patient continues to take Synthroid 112 mcg daily. She denies any signs and symptoms of hypothyroidism. She is tolerating her therapy well. Hypertension: Patient has been on amlodipine for many years. She is concerned that her blood pressure appears to remain elevated despite taking her amlodipine. She denies shortness of breath, chest pain, fatigue, swelling. Cholesterol: Patient states she has been taking atorvastatin for the past 20 years. She does try to focus on consuming less fried fatty foods. She is tolerating the statin well. Depression: Patient states she has had depression since 2020. She has been taking Zoloft 100 mg since 2002. She is questioning whether she needs to increase in her medication. She denies SI/HI tendencies. Caregiver stress: She is taking care of her 80-year-old mother who had a stroke. She states this can be very stressful throughout her day. She inquired about additional medication to help \"only when she needs it\"    OAB: Patient is seeing Narayan Wakefield, urology for overactive bladder. She currently has an InterStim. She never started the [place] and Ricardo due to the cost.    Skin irritation: Patient has been suffering from skin irritation to her vaginal area. Her urologist prescribed nystatin powder but she feels this is not effective. She is now using vinegar her with water twice a day. She states this is very effective for her irritation. Plantar fasciitis: Patient states she has suffered from plantar fasciitis for many years.   She will take Advil 2 tabs twice daily as needed for her pains.    Current medications: Pravachol 40 mg daily, Zoloft 100 mg daily, MiraLAX as needed, Synthroid 112 mcg daily, ibuprofen 200 mg as needed, vitamin D3 daily, amlodipine 5 mg daily. Patient is not taking, Narcan, nystatin powder, Zofran, Flomax, Sanctura. Plantar fascitis:       Past Medical History:   Diagnosis Date    Acquired hypothyroidism 12/22/2015    Allergic rhinitis     Atrophic vaginitis     Back pain     Cystitis     Degenerative disc disease, cervical 7/7/2016    Epigastric pain     Headache(784.0)     migraine    Hypercholesterolemia     Hyperlipidemia LDL goal <130 8/8/2016    Hypertension     Mixed incontinence 7/7/2016    Multiple closed fractures of ribs of right side 12/24/2019    Neck pain     Numbness of arm 12/18/06    left    OAB (overactive bladder) 5/8/2018    LOREN (obstructive sleep apnea)     no cpap    Phlebitis of saphenous vein 4/27/07    Left    Primary hypertension 6/6/2016    Thyroid disease     hypothyroidism    Urinary incontinence      Past Surgical History:   Procedure Laterality Date    HX CERVICAL DISKECTOMY  6/7/2016    HX COLONOSCOPY  approximately 2013    HX HYSTERECTOMY  approximately 1985    HX LUMBAR DISKECTOMY  2016    HX ORTHOPAEDIC  2006    right knee surgery     Current Outpatient Medications   Medication Sig    varicella-zoster recombinant, PF, (Shingrix, PF,) 50 mcg/0.5 mL susr injection 0.5mL by IntraMUSCular route once now and then repeat in 2-6 months    sertraline (ZOLOFT) 50 mg tablet Take 1 Tab by mouth daily. Take with Zoloft 100mg    sertraline (ZOLOFT) 100 mg tablet Take 1 Tab by mouth daily. Take with Zoloft 50mg    simvastatin (ZOCOR) 20 mg tablet Take 1 Tab by mouth nightly.  amLODIPine (NORVASC) 10 mg tablet Take 1 Tab by mouth daily. For blood pressure    levothyroxine (SYNTHROID) 112 mcg tablet Take 1 Tab by mouth Daily (before breakfast).     ibuprofen (MOTRIN) 200 mg tablet Take 400 mg by mouth every six (6) hours as needed for Pain.  cholecalciferol, vitamin D3, 2,000 unit tab Take 2,000 Units by mouth daily.  vitamin e (E GEMS) 1,000 unit capsule Take 1,000 Units by mouth daily.  polyethylene glycol (MIRALAX) 17 gram/dose powder Take 17 g by mouth daily as needed (For constipation). 1 tablespoon with 8 oz of water daily     No current facility-administered medications for this visit. Allergies and Intolerances:   No Known Allergies  Family History:   Family History   Problem Relation Age of Onset    Hypertension Mother     Heart Disease Father 62    Hypertension Father     Alzheimer Father     Cancer Other         Unsure    Other Other         cerebral aneurysm     Social History:   She  reports that she has never smoked. She has never used smokeless tobacco.   Social History     Substance and Sexual Activity   Alcohol Use No    Comment: rarely     Immunization History:  Immunization History   Administered Date(s) Administered    Pneumococcal Conjugate (PCV-13) 08/10/2018    Pneumococcal Polysaccharide (PPSV-23) 05/23/2014    Zoster 04/13/2009       Review of Systems:   As above included in HPI. Otherwise 11 point review of systems negative including constitutional, skin, HENT, eyes, respiratory, cardiovascular, gastrointestinal, genitourinary, musculoskeletal, endocrine, hematologic, allergy, and neurologic. Physical:   Visit Vitals  BP (!) 155/73   Pulse 68   Temp 97.3 °F (36.3 °C) (Temporal)   Resp 14   Ht 5' 6\" (1.676 m)   Wt 181 lb 3.2 oz (82.2 kg)   SpO2 99%   BMI 29.25 kg/m²      Wt Readings from Last 3 Encounters:   02/03/21 181 lb 3.2 oz (82.2 kg)   12/29/20 170 lb (77.1 kg)   07/02/20 181 lb 12.8 oz (82.5 kg)         Exam:   Physical Exam  Constitutional:       Appearance: Normal appearance. She is obese. HENT:      Head: Normocephalic and atraumatic.       Right Ear: External ear normal.      Left Ear: External ear normal.      Mouth/Throat:      Mouth: Mucous membranes are moist.   Eyes:      Extraocular Movements: Extraocular movements intact. Neck:      Musculoskeletal: Normal range of motion. Cardiovascular:      Rate and Rhythm: Normal rate and regular rhythm. Pulses: Normal pulses. Heart sounds: Normal heart sounds. Comments: No edema noted to renato LEs  Pulmonary:      Effort: Pulmonary effort is normal. No respiratory distress. Breath sounds: Normal breath sounds. Abdominal:      General: Abdomen is flat. Palpations: Abdomen is soft. Musculoskeletal: Normal range of motion. Skin:     General: Skin is warm and dry. Comments: Did not assess vaginal area for skin irritation. Neurological:      General: No focal deficit present. Mental Status: She is alert and oriented to person, place, and time. Psychiatric:         Mood and Affect: Mood normal.         Behavior: Behavior normal.        Body mass index is 29.25 kg/m². Review of Data:  Labs reviewed: yes  TSH good   to 135; TG 89  CMP good    Impression:  Patient Active Problem List   Diagnosis Code    LOREN (obstructive sleep apnea) G47.33    Acquired hypothyroidism E03.9    Hypovitaminosis D E55.9    Primary hypertension I10    Mixed incontinence N39.46    Hyperlipidemia LDL goal <130 E78.5    OAB (overactive bladder) N32.81    Caregiver stress Z63.6    Plantar fasciitis, bilateral M72.2       Plan:  1. Medicare annual wellness visit, subsequent      2. Screening for depression    - DEPRESSION SCREEN ANNUAL    3. Screening for ischemic heart disease    - LIPID PANEL; Future    4. Encounter to establish care with new doctor  Patient is transferring to me from their previous provider. I spent no less than 35 minutes reviewing and updating the chart to include previous labs, diagnostics, and specialty notes. 5. Acquired hypothyroidism  No chg in therapy. - levothyroxine (SYNTHROID) 112 mcg tablet; Take 1 Tab by mouth Daily (before breakfast).   Dispense: 90 Tab; Refill: 1    6. Primary hypertension  Noted blood pressure to remain constantly elevated in the 150s over high 80s. Discussed with patient and we decided to increase her amlodipine to 10 mg a day. Discussed with patient reducing sodium intake along with exercise. - amLODIPine (NORVASC) 10 mg tablet; Take 1 Tab by mouth daily. For blood pressure  Dispense: 90 Tab; Refill: 1    7. Hyperlipidemia LDL goal <130  LDL remains elevated at 135. Discussed these results with patient and agreed to discontinue the Pravachol and initiate low-dose of simvastatin. Patient to continue to work on her diet as well. - simvastatin (ZOCOR) 20 mg tablet; Take 1 Tab by mouth nightly. Dispense: 90 Tab; Refill: 1    8. Major depression in complete remission ([address]  Due to patient's increased stress and anxiety, increased patient's Zoloft by 50 mg a day. Educated patient on the complications and side effects of benzodiazepine and informed her I did not believe this was appropriate therapy for her. - sertraline (ZOLOFT) 50 mg tablet; Take 1 Tab by mouth daily. Take with Zoloft 100mg  Dispense: 90 Tab; Refill: 1  - sertraline (ZOLOFT) 100 mg tablet; Take 1 Tab by mouth daily. Take with Zoloft 50mg  Dispense: 90 Tab; Refill: 1    9. Caregiver stress  Patient is taking care of her 51-year-old mother who had a stroke. She requires much assistance. - sertraline (ZOLOFT) 50 mg tablet; Take 1 Tab by mouth daily. Take with Zoloft 100mg  Dispense: 90 Tab; Refill: 1  - sertraline (ZOLOFT) 100 mg tablet; Take 1 Tab by mouth daily. Take with Zoloft 50mg  Dispense: 90 Tab; Refill: 1    10. Overactive bladder  Pt has an interStim for OAB. She did not start Sanctura due to cost. She follows up with Dr Tiana Blanton, urology, as scheduled. 11. Skin irritation ([place])  Skin irritation occurred some weeks back. She attempted to use nystatin powder which was not effective.   She researched Internet and discovered vinegar and water was a cure for skin rashes. This has been effective. 12. Plantar fasciitis, bilateral  Plantar fasciitis is inflammation tissue that runs across the bottom of the foot. Symptoms include stabbing pain near the heel. Pain might be worst in the morning and lessens as the morning goes on. Treatments include home therapy (Every morning take a tennis ball, place on the floor and run your foot across the ball to allow it to roll/massage the sole of the foot from the toes to the heel), shoe inserts, steroid injections, and surgery if the case is severe. Patient verbalized understanding. [place] [address] maintenance  Spoke with patient in great detail in reference to the Covid vaccine. Instructed patient to call the [employer] hotline Monday through Friday 8 AM to 5 PM to schedule their Covid vaccine. Patient can also go on [facility]. [place].gov website and complete the form as instructed or check with their employer to see if they are offiering vaccine, if applicable. Instructed pt on how to protect themselves and others from covid ie wear face masks when leaving home, sanitize/wash hands frequently, avoid touching face, cough and sneeze into their elbow. Follow recommendations of their local and state health departments. Answered all questions/concerns from patient. 14. Medication management  Reviewed medication and completed the medication reconciliation with the patient. Reviewed side effects of medications with the patient. Questions were answered and patient verb understanding. Follow up 6 months  Labs needed 1 week prior to appt: Yes    Dr. Jitendra Cristina, AGNP-C, DNP  [facility] 5:   40 minutes with the patient on counseling, answering questions, and/or coordination of care. Complex medical review of medical history, lab results, and testing. Complicated management plan formulated.

## 2021-02-04 PROBLEM — M72.2 PLANTAR FASCIITIS, BILATERAL: Status: ACTIVE | Noted: 2021-02-04

## 2021-02-04 PROBLEM — Z63.6 CAREGIVER STRESS: Status: ACTIVE | Noted: 2021-02-04

## 2021-02-04 PROBLEM — F32.5 MAJOR DEPRESSION IN COMPLETE REMISSION (HCC): Status: RESOLVED | Noted: 2018-08-10 | Resolved: 2021-02-04

## 2021-02-04 RX ORDER — ZOSTER VACCINE RECOMBINANT, ADJUVANTED 50 MCG/0.5
KIT INTRAMUSCULAR
Qty: 0.5 ML | Refills: 1 | Status: SHIPPED | OUTPATIENT
Start: 2021-02-04 | End: 2021-07-27

## 2021-02-05 NOTE — PATIENT INSTRUCTIONS
Medicare Wellness Visit, Female The best way to live healthy is to have a lifestyle where you eat a well-balanced diet, exercise regularly, limit alcohol use, and quit all forms of tobacco/nicotine, if applicable. Regular preventive services are another way to keep healthy. Preventive services (vaccines, screening tests, monitoring & exams) can help personalize your care plan, which helps you manage your own care. Screening tests can find health problems at the earliest stages, when they are easiest to treat. [facility] follows the current, evidence-based guidelines published by the [facility] ([facility]) when recommending preventive services for our patients. Because we follow these guidelines, sometimes recommendations change over time as research supports it. (For example, mammograms used to be recommended annually. Even though Medicare will still pay for an annual mammogram, the newer guidelines recommend a mammogram every two years for women of average risk). Of course, you and your doctor may decide to screen more often for some diseases, based on your risk and your co-morbidities (chronic disease you are already diagnosed with). Preventive services for you include: - Medicare offers their members a free annual wellness visit, which is time for you and your primary care provider to discuss and plan for your preventive service needs. Take advantage of this benefit every year! 
-All adults over the age of 72 should receive the recommended pneumonia vaccines. Current USPSTF guidelines recommend a series of two vaccines for the best pneumonia protection.  
-All adults should have a flu vaccine yearly and a tetanus vaccine every 10 years.  
-All adults age 48 and older should receive the shingles vaccines (series of two vaccines).      
-All adults age 38-68 who are overweight should have a diabetes screening test once every three years.  
-All adults born between [birth year] and 1967 should be screened once for Hepatitis C. 
-Other screening tests and preventive services for persons with diabetes include: an eye exam to screen for diabetic retinopathy, a kidney function test, a foot exam, and stricter control over your cholesterol.  
-Cardiovascular screening for adults with routine risk involves an electrocardiogram (ECG) at intervals determined by your doctor.  
-Colorectal cancer screenings should be done for adults age 54-65 with no increased risk factors for colorectal cancer. There are a number of acceptable methods of screening for this type of cancer. Each test has its own benefits and drawbacks. Discuss with your doctor what is most appropriate for you during your annual wellness visit. The different tests include: colonoscopy (considered the best screening method), a fecal occult blood test, a fecal DNA test, and sigmoidoscopy. 
 
-A bone mass density test is recommended when a woman turns 65 to screen for osteoporosis. This test is only recommended one time, as a screening. Some providers will use this same test as a disease monitoring tool if you already have osteoporosis. -Breast cancer screenings are recommended every other year for women of normal risk, age 54-69. 
-Cervical cancer screenings for women over age 72 are only recommended with certain risk factors. Here is a list of your current Health Maintenance items (your personalized list of preventive services) with a due date: 
Health Maintenance Due Topic Date Due  
 COVID-19 Vaccine (1 of 2) 01/10/1966  DTaP/Tdap/Td  (1 - Tdap) 01/10/1969  Shingles Vaccine (1 of 2) 01/10/1998  Glaucoma Screening   03/11/2017  Mammogram  08/17/2019  Yearly Flu Vaccine (1) 09/01/2020

## 2021-02-05 NOTE — PROGRESS NOTES
This is the Subsequent Medicare Annual Wellness Exam, performed 12 months or more after the Initial AWV or the last Subsequent AWV    I have reviewed the patient's medical history in detail and updated the computerized patient record. Depression Risk Factor Screening:     3 most recent PHQ Screens 2/3/2021   Little interest or pleasure in doing things Several days   Feeling down, depressed, irritable, or hopeless Several days   Total Score PHQ 2 2       Alcohol Risk Screen    Do you average more than 1 drink per night or more than 7 drinks a week:  No    On any one occasion in the past three months have you have had more than 3 drinks containing alcohol:  No        Functional Ability and Level of Safety:    Hearing: Hearing is good. Activities of Daily Living: The home contains: no safety equipment. Patient does total self care      Ambulation: with no difficulty     Fall Risk:  Fall Risk Assessment, last 12 mths 2/3/2021   Able to walk? Yes   Fall in past 12 months? 0   Do you feel unsteady? 0   Are you worried about falling 1   Is the gait abnormal? 0   Number of falls in past 12 months -   Fall with injury? -      Abuse Screen:  Patient is not abused       Cognitive Screening    Has your family/caregiver stated any concerns about your memory: no     Cognitive Screening: n/a    Assessment/Plan   Education and counseling provided:  Are appropriate based on today's review and evaluation  Cardiovascular screening blood test  Screening for glaucoma  Shingrix and Covid vaccines. and influenza    Diagnoses and all orders for this visit:    1. Acquired hypothyroidism  -     levothyroxine (SYNTHROID) 112 mcg tablet; Take 1 Tab by mouth Daily (before breakfast). 2. Primary hypertension  -     amLODIPine (NORVASC) 10 mg tablet; Take 1 Tab by mouth daily. For blood pressure    3. Anxiety  -     sertraline (ZOLOFT) 50 mg tablet; Take 1 Tab by mouth daily.  Take with Zoloft 100mg  -     sertraline (ZOLOFT) 100 mg tablet; Take 1 Tab by mouth daily. Take with Zoloft 50mg    4. Caregiver stress  -     sertraline (ZOLOFT) 50 mg tablet; Take 1 Tab by mouth daily. Take with Zoloft 100mg  -     sertraline (ZOLOFT) 100 mg tablet; Take 1 Tab by mouth daily. Take with Zoloft 50mg    5. Hyperlipidemia LDL goal <130  -     simvastatin (ZOCOR) 20 mg tablet; Take 1 Tab by mouth nightly. 6. Medicare annual wellness visit, subsequent    7. Screening for depression  -     DEPRESSION SCREEN ANNUAL    8. Screening for ischemic heart disease  -     LIPID PANEL; Future    9.  Major depression in complete remission ([facility] [address]    Other orders  -     varicella-zoster recombinant, PF, (Shingrix, PF,) 50 mcg/0.5 mL susr injection; 0.5mL by IntraMUSCular route once now and then repeat in 2-6 months        Health Maintenance Due     Health Maintenance Due   Topic Date Due    COVID-19 Vaccine (1 of 2) 01/10/1964    DTaP/Tdap/Td series (1 - Tdap) 01/10/1969    Shingrix Vaccine Age 50> (1 of 2) 01/10/1998    GLAUCOMA SCREENING Q2Y  03/11/2017    Breast Cancer Screen Mammogram  08/17/2019    Flu Vaccine (1) 09/01/2020       Patient Care Team   Patient Care Team:  Margaret Lal as PCP - General (Nurse Practitioner)  Eliud Fitch DNP as PCP - [facility]  Wood Karimi MD (Dermatology)  Francoise Bowens MD (Orthopedic Surgery)  Salvatore Miranda MD (Ophthalmology)  Fernanda Crook MD (Ophthalmology)  Ashley Amador RN as [facility] (Internal Medicine)  Kelin Emerson MD (Urology)    History     Patient Active Problem List   Diagnosis Code    LOREN (obstructive sleep apnea) G47.33    Allergic rhinitis J30.9    Atrophic vaginitis N95.2    Headache(784.0) R51    Shoulder pain, left M25.512    Acquired hypothyroidism E03.9    Hypovitaminosis D E55.9    Primary hypertension I10    Superficial phlebitis of left leg I80.02    Mixed incontinence N39.46    Degenerative disc disease, cervical M50.30    Hyperlipidemia LDL goal <130 E78.5    Urinary incontinence R32    OAB (overactive bladder) N32.81    Major depression in complete remission (HCC) F32.5    Multiple closed fractures of ribs of right side S22.41XA     Past Medical History:   Diagnosis Date    Allergic rhinitis     Atrophic vaginitis     Back pain     Cystitis     Epigastric pain     Headache(784.0)     migraine    Hypercholesterolemia     Hypertension     Neck pain     Numbness of arm 12/18/06    left    LOREN (obstructive sleep apnea)     no cpap    Phlebitis of saphenous vein 4/27/07    Left    Thyroid disease     hypothyroidism    Urinary incontinence       Past Surgical History:   Procedure Laterality Date    HX CERVICAL DISKECTOMY  6/7/2016    HX COLONOSCOPY  approximately 2013    HX HYSTERECTOMY  approximately 1985    HX LUMBAR DISKECTOMY  2016    HX ORTHOPAEDIC  2006    right knee surgery     Current Outpatient Medications   Medication Sig Dispense Refill    sertraline (ZOLOFT) 50 mg tablet Take 1 Tab by mouth daily. Take with Zoloft 100mg 90 Tab 1    sertraline (ZOLOFT) 100 mg tablet Take 1 Tab by mouth daily. Take with Zoloft 50mg 90 Tab 1    simvastatin (ZOCOR) 20 mg tablet Take 1 Tab by mouth nightly. 90 Tab 1    amLODIPine (NORVASC) 10 mg tablet Take 1 Tab by mouth daily. For blood pressure 90 Tab 1    levothyroxine (SYNTHROID) 112 mcg tablet Take 1 Tab by mouth Daily (before breakfast). 90 Tab 1    ibuprofen (MOTRIN) 200 mg tablet Take 400 mg by mouth every six (6) hours as needed for Pain.  cholecalciferol, vitamin D3, 2,000 unit tab Take 2,000 Units by mouth daily.  vitamin e (E GEMS) 1,000 unit capsule Take 1,000 Units by mouth daily.  polyethylene glycol (MIRALAX) 17 gram/dose powder Take 17 g by mouth daily as needed (For constipation).  1 tablespoon with 8 oz of water daily 235 g 0     No Known Allergies    Family History   Problem Relation Age of Onset    Hypertension Mother     Heart Disease Father 62    Hypertension Father     Alzheimer Father     Cancer Other         Unsure    Other Other         cerebral aneurysm     Social History     Tobacco Use    Smoking status: Never Smoker    Smokeless tobacco: Never Used   Substance Use Topics    Alcohol use: No     Comment: rarely

## 2021-05-04 ENCOUNTER — OFFICE VISIT (OUTPATIENT)
Dept: ORTHOPEDIC SURGERY | Age: 71
End: 2021-05-04
Payer: MEDICARE

## 2021-05-04 VITALS
OXYGEN SATURATION: 98 % | WEIGHT: 178 LBS | HEIGHT: 66 IN | HEART RATE: 82 BPM | TEMPERATURE: 97.7 F | BODY MASS INDEX: 28.61 KG/M2

## 2021-05-04 DIAGNOSIS — M76.61 ACHILLES TENDINITIS OF RIGHT LOWER EXTREMITY: Primary | ICD-10-CM

## 2021-05-04 DIAGNOSIS — M79.671 PAIN OF RIGHT HEEL: ICD-10-CM

## 2021-05-04 PROCEDURE — G8419 CALC BMI OUT NRM PARAM NOF/U: HCPCS | Performed by: ORTHOPAEDIC SURGERY

## 2021-05-04 PROCEDURE — G8756 NO BP MEASURE DOC: HCPCS | Performed by: ORTHOPAEDIC SURGERY

## 2021-05-04 PROCEDURE — 1090F PRES/ABSN URINE INCON ASSESS: CPT | Performed by: ORTHOPAEDIC SURGERY

## 2021-05-04 PROCEDURE — G8536 NO DOC ELDER MAL SCRN: HCPCS | Performed by: ORTHOPAEDIC SURGERY

## 2021-05-04 PROCEDURE — G8399 PT W/DXA RESULTS DOCUMENT: HCPCS | Performed by: ORTHOPAEDIC SURGERY

## 2021-05-04 PROCEDURE — G8432 DEP SCR NOT DOC, RNG: HCPCS | Performed by: ORTHOPAEDIC SURGERY

## 2021-05-04 PROCEDURE — G8427 DOCREV CUR MEDS BY ELIG CLIN: HCPCS | Performed by: ORTHOPAEDIC SURGERY

## 2021-05-04 PROCEDURE — 1101F PT FALLS ASSESS-DOCD LE1/YR: CPT | Performed by: ORTHOPAEDIC SURGERY

## 2021-05-04 PROCEDURE — 73610 X-RAY EXAM OF ANKLE: CPT | Performed by: ORTHOPAEDIC SURGERY

## 2021-05-04 PROCEDURE — 3017F COLORECTAL CA SCREEN DOC REV: CPT | Performed by: ORTHOPAEDIC SURGERY

## 2021-05-04 PROCEDURE — 99203 OFFICE O/P NEW LOW 30 MIN: CPT | Performed by: ORTHOPAEDIC SURGERY

## 2021-05-04 NOTE — PROGRESS NOTES
AMBULATORY PROGRESS NOTE      Patient: Ronit Mayfield             MRN: [id number]     SSN: [id number] Body mass index is 28.73 kg/m². YOB: 1950     AGE: 66 y.o. EX: female    PCP: Didi Montelongo       IMPRESSION //  DIAGNOSIS AND TREATMENT PLAN        Ronit Mayfield has a diagnosis of:      DIAGNOSES  1. Achilles tendinitis of right lower extremity    2. Pain of right heel        Orders Placed This Encounter    Generic Supply Order     R short CAM walker boot    [id number] Ankle Min 3V     Order Specific Question:   Weight bearing? Answer:   No        PLAN:    1. I will obtain a 3-view XR of her R ankle in the office today. 2. I will provide a DME order: R short CAM walker boot for pt to wear at least 2 weeks and then to gradually wean. 3. I will advise pt to continuing using her inserts. 4. I will recommend OTC medication such as Tylenol. 5. I anticipate ordering a MRI of R ankle if pain does not subsides. RTO-  4 WEEKS    Ronit Mayfield  expresses understanding of the diagnosis, treatment plan, and all of their proposed questions were answered to their satisfaction. Patient education has been provided re the diagnoses. Ronit Mayfield may have a reminder for a \"due or due soon\" health maintenance. I have asked that she contact her primary care provider for follow-up on this health maintenance. HPI //  [address] IS A 66 y.o. female who is a/an  new patient, presenting to my outpatient office for evaluation of  the following chief complaint(s):     Chief Complaint   Patient presents with    Foot Pain     right heel     Patient presents with right achilles tendon pain that radiates up towards her R leg that has been occurring for the past ~2 months. She states she applies a hot compression to her R achilles tendon to help manage pain. Pt notes she also wears Crocs with inserts for comfort.  She mentions her gait is affected by her R achilles tendon pain. Pt treatment to date includes Aleve. Pt shows me the different type of ankle sleeves she uses. She mentions she has cycles of R achilles tendon pain throughout the year. 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 Visit Vitals  Pulse 82   Temp 97.7 °F (36.5 °C) (Temporal)   Ht 5' 6\" (1.676 m)   Wt 178 lb (80.7 kg)   SpO2 98%   BMI 28.73 kg/m²       Appearance: Alert, well appearing and pleasant patient who is in no distress, oriented to person, place/time, and who follows commands. This patient is accompanied in the examination room by her  self. There is signs of: no dementia  Psychiatric: Affect/mood are appropriate. Speech normal in context and clarity, memory intact grossly, no involuntary movements - tremors. Patient arrives to office via: without assistive device. H EENT (2): Head normocephalic & atraumatic. Eye: pupils are round// EOM are intact // Neck: ROM WNL  // Hearings Intact   Respiratory: Breathing non labored     ANKLE/FOOT right    Gait: normal  Tenderness: exquisitely tender to the insertional point of the R distal achilles  Cutaneous: distal moderate swelling to R achilles tendon  Joint Motion:   WNL  Joint / Tendon Stability: No Ankle or Subtalar instability or joint laxity. No peroneal sublux ability or dislocation  Alignment: neutral Hindfoot,    Neuro Motor/Sensory: NL/NL  Vascular: NL foot/ankle pulses,   Lymphatics: No extremity lymphedema, No calf swelling, no tenderness to calf muscles. CHART REVIEW     Cesar Mcadams has been experiencing pain and discomfort confirmed as outlined in the pain assessment outlined below.  was reviewed by Joe Laguerre MD on 5/4/2021. Pain Assessment  5/4/2021   Location of Pain Foot   Location Modifiers Right   Severity of Pain 8   Quality of Pain Aching; Other (Comment)   Quality of Pain Comment moves into right calf   Duration of Pain Persistent   Frequency of Pain Constant   Aggravating Factors Bending;Standing;Walking;Stairs   Limiting Behavior Yes   Relieving Factors Heat   Result of Injury No        Ang Tate  has a past medical history of Acquired hypothyroidism (12/22/2015), Allergic rhinitis, Atrophic vaginitis, Back pain, Cystitis, Degenerative disc disease, cervical (7/7/2016), Epigastric pain, Headache(784.0), Hypercholesterolemia, Hyperlipidemia LDL goal <130 (8/8/2016), Hypertension, Mixed incontinence (7/7/2016), Multiple closed fractures of ribs of right side (12/24/2019), Neck pain, Numbness of arm (12/18/06), OAB (overactive bladder) (5/8/2018), LOREN (obstructive sleep apnea), Phlebitis of saphenous vein (4/27/07), Primary hypertension (6/6/2016), Thyroid disease, and Urinary incontinence.      Patients is employed at:         Past Medical History:   Diagnosis Date    Acquired hypothyroidism 12/22/2015    Allergic rhinitis     Atrophic vaginitis     Back pain     Cystitis     Degenerative disc disease, cervical 7/7/2016    Epigastric pain     Headache(784.0)     migraine    Hypercholesterolemia     Hyperlipidemia LDL goal <130 8/8/2016    Hypertension     Mixed incontinence 7/7/2016    Multiple closed fractures of ribs of right side 12/24/2019    Neck pain     Numbness of arm 12/18/06    left    OAB (overactive bladder) 5/8/2018    LOREN (obstructive sleep apnea)     no cpap    Phlebitis of saphenous vein 4/27/07    Left    Primary hypertension 6/6/2016    Thyroid disease     hypothyroidism    Urinary incontinence      Past Surgical History:   Procedure Laterality Date    HX CERVICAL DISKECTOMY  6/7/2016    HX COLONOSCOPY  approximately 2013    HX HYSTERECTOMY  approximately 1985    HX LUMBAR DISKECTOMY  2016    HX ORTHOPAEDIC  2006    right knee surgery     Current Outpatient Medications   Medication Sig    varicella-zoster recombinant, PF, (Shingrix, PF,) 50 mcg/0.5 mL susr injection 0.5mL by IntraMUSCular route once now and then repeat in 2-6 months    sertraline (ZOLOFT) 50 mg tablet Take 1 Tab by mouth daily. Take with Zoloft 100mg    sertraline (ZOLOFT) 100 mg tablet Take 1 Tab by mouth daily. Take with Zoloft 50mg    simvastatin (ZOCOR) 20 mg tablet Take 1 Tab by mouth nightly.  amLODIPine (NORVASC) 10 mg tablet Take 1 Tab by mouth daily. For blood pressure    levothyroxine (SYNTHROID) 112 mcg tablet Take 1 Tab by mouth Daily (before breakfast).  cholecalciferol, vitamin D3, 2,000 unit tab Take 2,000 Units by mouth daily.  vitamin e (E GEMS) 1,000 unit capsule Take 1,000 Units by mouth daily.  trospium (SANCTURA XL) 60 mg capsule Take 1 Cap by mouth Daily (before breakfast).  ibuprofen (MOTRIN) 200 mg tablet Take 400 mg by mouth every six (6) hours as needed for Pain.  polyethylene glycol (MIRALAX) 17 gram/dose powder Take 17 g by mouth daily as needed (For constipation). 1 tablespoon with 8 oz of water daily     No current facility-administered medications for this visit.       No Known Allergies  Social History     Occupational History    Not on file   Tobacco Use    Smoking status: Never Smoker    Smokeless tobacco: Never Used   Substance and Sexual Activity    Alcohol use: No     Comment: rarely    Drug use: No    Sexual activity: Yes     Partners: Male     Birth control/protection: Surgical     Family History   Problem Relation Age of Onset    Hypertension Mother     Heart Disease Father 62    Hypertension Father     Alzheimer Father     Cancer Other         Unsure    Other Other         cerebral aneurysm        DIAGNOSTIC LAB DATA      No results found for: HBA1C, HGBE8, BDO3IDSZ, LGT2KSNS //   Lab Results   Component Value Date/Time    Glucose 94 01/29/2021 12:00 AM        No results found for: QQB8OPXG, WSM9BYEA      Lab Results   Component Value Date/Time    Vitamin D 25-Hydroxy 39 08/18/2010 08:27 AM    VITAMIN D, 25-HYDROXY 34.3 02/05/2019 10:01 AM         REVIEW OF SYSTEMS : 5/4/2021 ALL BELOW ARE Negative except : SEE HPI     All other systems reviewed and are negative. 12 point review of systems otherwise negative unless noted in HPI. DIAGNOSTIC IMAGING /ORDERS     ANKLE X RAYS 3 VIEWS RIGHT  [facility] RAYS AT [facility] [address] [place] [address]  5/4/2021    FINDINGS:     SOFT TISSUES:              Absent soft tissue swelling, No soft tissue calcifications, No Calcified blood vessels     Soft tissue swelling location:    None to fibula region        None medial aspect    None dorsal midfoot/forefoot  No radiopaque foreign body, abnormal lucency, or focal swelling noted within soft tissues. OSSEOUS:     No fractures, subluxations, dislocations   Bone spur to inferior aspect of calcaneus is  absent   Bone spur to superior calcaneus region is           moderate SIZED CALCIFIC INSERTIONAL BONE SPUR IS PRESENT   Mineralization: suggests  Osteopenia      ALIGNMENT:    Ankle mortise alignment is congruent. Tibial plafond and talar dome intact      No Osteochondral defects seen    No Ankle joint effusion seen         I have personally reviewed these images of the above study. The interpretation of this study is my professional opinion. Ariel Tai MD  5/4/2021  5:31 PM          I have reviewed the results of the above study. The interpretation of this study is my professional opinion. An electronic signature was used to authenticate this note. Disclaimer: Sections of this note are dictated using utilizing voice recognition software, which may have resulted in some phonetic based errors in grammar and contents. Even though attempts were made to correct all the mistakes, some may have been missed, and remained in the body of the document. If questions arise, please contact our department. Sunday Joellen, as dictated by Desire Vernon MD  5/4/2021  11:47 AM

## 2021-06-01 ENCOUNTER — OFFICE VISIT (OUTPATIENT)
Dept: ORTHOPEDIC SURGERY | Age: 71
End: 2021-06-01
Payer: MEDICARE

## 2021-06-01 VITALS
OXYGEN SATURATION: 97 % | HEIGHT: 66 IN | TEMPERATURE: 96.9 F | WEIGHT: 178 LBS | BODY MASS INDEX: 28.61 KG/M2 | HEART RATE: 71 BPM

## 2021-06-01 DIAGNOSIS — M76.61 ACHILLES TENDINITIS OF RIGHT LOWER EXTREMITY: ICD-10-CM

## 2021-06-01 DIAGNOSIS — M25.562 ACUTE PAIN OF BOTH KNEES: ICD-10-CM

## 2021-06-01 DIAGNOSIS — M25.561 ACUTE PAIN OF BOTH KNEES: ICD-10-CM

## 2021-06-01 DIAGNOSIS — M17.12 PRIMARY OSTEOARTHRITIS OF LEFT KNEE: Primary | ICD-10-CM

## 2021-06-01 PROCEDURE — G8432 DEP SCR NOT DOC, RNG: HCPCS | Performed by: ORTHOPAEDIC SURGERY

## 2021-06-01 PROCEDURE — G8427 DOCREV CUR MEDS BY ELIG CLIN: HCPCS | Performed by: ORTHOPAEDIC SURGERY

## 2021-06-01 PROCEDURE — 99213 OFFICE O/P EST LOW 20 MIN: CPT | Performed by: ORTHOPAEDIC SURGERY

## 2021-06-01 PROCEDURE — 73560 X-RAY EXAM OF KNEE 1 OR 2: CPT | Performed by: ORTHOPAEDIC SURGERY

## 2021-06-01 PROCEDURE — G8756 NO BP MEASURE DOC: HCPCS | Performed by: ORTHOPAEDIC SURGERY

## 2021-06-01 PROCEDURE — G8399 PT W/DXA RESULTS DOCUMENT: HCPCS | Performed by: ORTHOPAEDIC SURGERY

## 2021-06-01 PROCEDURE — 1090F PRES/ABSN URINE INCON ASSESS: CPT | Performed by: ORTHOPAEDIC SURGERY

## 2021-06-01 PROCEDURE — 3017F COLORECTAL CA SCREEN DOC REV: CPT | Performed by: ORTHOPAEDIC SURGERY

## 2021-06-01 PROCEDURE — 20610 DRAIN/INJ JOINT/BURSA W/O US: CPT | Performed by: ORTHOPAEDIC SURGERY

## 2021-06-01 PROCEDURE — G8536 NO DOC ELDER MAL SCRN: HCPCS | Performed by: ORTHOPAEDIC SURGERY

## 2021-06-01 PROCEDURE — G8419 CALC BMI OUT NRM PARAM NOF/U: HCPCS | Performed by: ORTHOPAEDIC SURGERY

## 2021-06-01 PROCEDURE — 1101F PT FALLS ASSESS-DOCD LE1/YR: CPT | Performed by: ORTHOPAEDIC SURGERY

## 2021-06-01 RX ORDER — TRIAMCINOLONE ACETONIDE 40 MG/ML
40 INJECTION, SUSPENSION INTRA-ARTICULAR; INTRAMUSCULAR ONCE
Status: COMPLETED | OUTPATIENT
Start: 2021-06-01 | End: 2021-06-01

## 2021-06-01 RX ADMIN — TRIAMCINOLONE ACETONIDE 40 MG: 40 INJECTION, SUSPENSION INTRA-ARTICULAR; INTRAMUSCULAR at 15:03

## 2021-06-01 NOTE — PROGRESS NOTES
AMBULATORY PROGRESS NOTE      Patient: Alaina De La Paz             MRN: [id number]     SSN: [id number] Body mass index is 28.73 kg/m². YOB: 1950     AGE: 66 y.o. EX: female    PCP: Didi Driscoll       IMPRESSION //  DIAGNOSIS AND TREATMENT PLAN        Aliana De La Paz has a diagnosis of:      DIAGNOSES    1. Primary osteoarthritis of left knee    2. Achilles tendinitis of right lower extremity    3. Acute pain of both knees        Orders Placed This Encounter    DRAIN/INJECT LARGE JOINT/BURSA    Generic Supply Order     Neoprene knee sleeve    Generic Supply Order     Bauerfeind Achillotrain(LMS)    [id number] Knee 1-2V     Order Specific Question:   Reason for Exam     Answer:   pain    [15717] Knee 1-2V     Order Specific Question:   Reason for Exam     Answer:   pain    triamcinolone acetonide (KENALOG-40) 40 mg/mL injection 40 mg        PLAN:    1. I will obtain 1/2 view of each knee in the office today. 2. I will provide a DME order: Adam Chessman sleeve for right achilles tendon pain and Neoprene left knee sleeve for left knee pain. 3. I will administer a Cortisone injection in her left knee in the office today. RTO-  6 TO 8 WEEKS    Alaina De La Paz  expresses understanding of the diagnosis, treatment plan, and all of their proposed questions were answered to their satisfaction. Patient education has been provided re the diagnoses. HPI //  [address] IS A 66 y.o. female who is a/an  established patient, presenting to my outpatient office for evaluation of  the following chief complaint(s):     Chief Complaint   Patient presents with    Foot Pain     right heel     At [address] patient presented with right achilles tendon pain that radiated up towards her R leg that had been occurring for the past ~2 months. She stated she applied a hot compression to her R achilles tendon to help manage pain.  Pt noted she also wore Crocs with inserts for comfort. She mentioned her gait was affected by her R achilles tendon pain. Pt treatment to date includes Aleve. Pt showed me the different type of ankle sleeves she used. I obtained a 3-view XR of her R ankle in the office. I provided a DME order: R short CAM walker boot for pt to wear at least 2 weeks and then to gradually wean. I advised pt to continuing using her inserts. I recommended OTC medication such as Tylenol. I anticipated ordering a MRI of R ankle if pain does not subsides.     She mentioned she had cycles of R achilles tendon pain throughout the year.      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 Since LOV pt states she has right achilles tendon pain when not wearing short CAM walker boot. Pt endorses bilateral knee pain(L>R). She mentions she does not have much flexibility with her left knee. Denies any locking or catching of her knees, but mentions she does get cramping sensations in her knees. She notes her left knee wants to lean inward during ambulation and she has trouble with lifting her left leg. Pt states she has had cortisone injections in both of her knees in the past.     Communicates she has had lumbar pain.        Visit Vitals  Pulse 71   Temp 96.9 °F (36.1 °C) (Temporal)   Ht 5' 6\" (1.676 m)   Wt 178 lb (80.7 kg)   SpO2 97%   BMI 28.73 kg/m²       Appearance: Alert, well appearing and pleasant patient who is in no distress, oriented to person, place/time, and who follows commands. This patient is accompanied in the examination room by her  self. There is signs of: no dementia  Psychiatric: Affect/mood are appropriate. Speech normal in context and clarity, memory intact grossly, no involuntary movements - tremors. Patient arrives to office via: without assistive device. H EENT (2): Head normocephalic & atraumatic.   Eye: pupils are round// EOM are intact // Neck: ROM WNL  // Hearings Intact   Respiratory: Breathing non labored     ANKLE/FOOT right    Gait: slow  Tenderness: mild Achilles tendon insertion point  Cutaneous: Moderate swelling right Achilles tendon WNL. Joint Motion: Functional ankle range of motion is noted WNL  Joint / Tendon Stability: No Ankle or Subtalar instability or joint laxity. No peroneal sublux ability or dislocation  Alignment:  -Alignment within normal limits Hindfoot,    Neuro Motor/Sensory: NL/NL  Vascular: NL foot/ankle pulses,   Lymphatics: No extremity lymphedema, No calf swelling, no tenderness to calf muscles. Knees:  Bilateral       Cutaneous: Skin intact, no abrasions, blisters, wounds, erythema       Effusion: Is not present       Crepitus:  mild PF joint crepitus of left knee,        Tenderness: Tenderness: Medial joint line, Lateral joint line, Patella and Patellar tendon of the left knee, mild medial compartment tenderness to the right knee       Alignment of Knee: mild varus when standing right knee when standing, some valgus alignment left knee when standing       ROM: diminished range of motion       Fullness or swelling: None to popliteal fossa region,         Stability: No instability to anterior, posterior, varus, valgus stress testing       Neuro: knee flexion, knee extension, plantar flexion and plantar dorsiflexion, Extensor mechanism is intact          CHART REVIEW     Yoseph Thao has been experiencing pain and discomfort confirmed as outlined in the pain assessment outlined below.  was reviewed by Julius Pozo MD on 6/1/2021.      Pain Assessment  6/1/2021   Location of Pain Foot   Location Modifiers Right   Severity of Pain 5   Quality of Pain Throbbing   Quality of Pain Comment -   Duration of Pain Persistent   Frequency of Pain Constant   Aggravating Factors Walking;Standing   Limiting Behavior Yes   Relieving Factors Rest;Heat;Other (Comment)   Relieving Factors Comment otc medication   Result of Injury No        Yoseph Thao  has a past medical history of Acquired hypothyroidism (12/22/2015), Allergic rhinitis, Atrophic vaginitis, Back pain, Cystitis, Degenerative disc disease, cervical (7/7/2016), Epigastric pain, Headache(784.0), Hypercholesterolemia, Hyperlipidemia LDL goal <130 (8/8/2016), Hypertension, Mixed incontinence (7/7/2016), Multiple closed fractures of ribs of right side (12/24/2019), Neck pain, Numbness of arm (12/18/06), OAB (overactive bladder) (5/8/2018), LROEN (obstructive sleep apnea), Phlebitis of saphenous vein (4/27/07), Primary hypertension (6/6/2016), Thyroid disease, and Urinary incontinence. Patients is employed at:         Past Medical History:   Diagnosis Date    Acquired hypothyroidism 12/22/2015    Allergic rhinitis     Atrophic vaginitis     Back pain     Cystitis     Degenerative disc disease, cervical 7/7/2016    Epigastric pain     Headache(784.0)     migraine    Hypercholesterolemia     Hyperlipidemia LDL goal <130 8/8/2016    Hypertension     Mixed incontinence 7/7/2016    Multiple closed fractures of ribs of right side 12/24/2019    Neck pain     Numbness of arm 12/18/06    left    OAB (overactive bladder) 5/8/2018    LOREN (obstructive sleep apnea)     no cpap    Phlebitis of saphenous vein 4/27/07    Left    Primary hypertension 6/6/2016    Thyroid disease     hypothyroidism    Urinary incontinence      Past Surgical History:   Procedure Laterality Date    HX CERVICAL DISKECTOMY  6/7/2016    HX COLONOSCOPY  approximately 2013    HX HYSTERECTOMY  approximately 1985    HX LUMBAR DISKECTOMY  2016    HX ORTHOPAEDIC  2006    right knee surgery    [place] PLASTIC SURGERY, NECK       Current Outpatient Medications   Medication Sig    sertraline (ZOLOFT) 50 mg tablet Take 1 Tab by mouth daily. Take with Zoloft 100mg    sertraline (ZOLOFT) 100 mg tablet Take 1 Tab by mouth daily. Take with Zoloft 50mg    simvastatin (ZOCOR) 20 mg tablet Take 1 Tab by mouth nightly.     amLODIPine (NORVASC) 10 mg tablet Take 1 Tab by mouth daily. For blood pressure    levothyroxine (SYNTHROID) 112 mcg tablet Take 1 Tab by mouth Daily (before breakfast).  trospium (SANCTURA XL) 60 mg capsule Take 1 Cap by mouth Daily (before breakfast). (Patient not taking: Reported on 6/1/2021)    varicella-zoster recombinant, PF, (Shingrix, PF,) 50 mcg/0.5 mL susr injection 0.5mL by IntraMUSCular route once now and then repeat in 2-6 months (Patient not taking: Reported on 6/1/2021)    ibuprofen (MOTRIN) 200 mg tablet Take 400 mg by mouth every six (6) hours as needed for Pain. (Patient not taking: Reported on 6/1/2021)    polyethylene glycol (MIRALAX) 17 gram/dose powder Take 17 g by mouth daily as needed (For constipation). 1 tablespoon with 8 oz of water daily (Patient not taking: Reported on 6/1/2021)    cholecalciferol, vitamin D3, 2,000 unit tab Take 2,000 Units by mouth daily. (Patient not taking: Reported on 6/1/2021)    vitamin e (E GEMS) 1,000 unit capsule Take 1,000 Units by mouth daily. (Patient not taking: Reported on 6/1/2021)     No current facility-administered medications for this visit.      No Known Allergies  Social History     Occupational History    Not on file   Tobacco Use    Smoking status: Never Smoker    Smokeless tobacco: Never Used   Substance and Sexual Activity    Alcohol use: No     Comment: rarely    Drug use: No    Sexual activity: Yes     Partners: Male     Birth control/protection: Surgical     Family History   Problem Relation Age of Onset    Hypertension Mother     Heart Disease Father 62    Hypertension Father     Alzheimer Father     Cancer Other         Unsure    Other Other         cerebral aneurysm        DIAGNOSTIC LAB DATA      No results found for: HBA1C, LZZ4FJCF, MPA6LBWZ //   Lab Results   Component Value Date/Time    Glucose 94 01/29/2021 12:00 AM        No results found for: UHE9XIAG, EBF8PLNX      Lab Results   Component Value Date/Time    Vitamin D 25-Hydroxy 39 08/18/2010 08:27 AM VITAMIN D, 25-HYDROXY 34.3 02/05/2019 10:01 AM         REVIEW OF SYSTEMS : 6/1/2021  ALL BELOW ARE Negative except : SEE HPI     All other systems reviewed and are negative. 12 point review of systems otherwise negative unless noted in HPI. DIAGNOSTIC IMAGING /ORDERS     X RAYS AT [address] [place] [address]  6/1/2021    LEFT KNEE    WEIGHT BEARING    Bones: No fractures, subluxations, or dislocations  Alignment: Normal (WNL)  Joint: MEDIAL/LATEAL/PF JOINT OA Joints: Well maintained  Soft Tissues: Normal  OA: No significant OA noted  Mineralization: suggests Osteoporosis    I have personally reviewed the results of the above study. The interpretation of this study is my professional opinion. X RAYS AT [address] [place] [address]  6/1/2021    Right KNEE:     WEIGHT BEARING    Bones: No fractures, subluxations, or dislocations  Alignment: moderate Varus Knee alignment  Joint: Medial joint with severe OA changes    Soft Tissues: Mild swelling  Mineralization:suggests Osteoporosis    I have personally reviewed the results of the above study. The interpretation of this study is my professional opinion. I have reviewed the results of the above study. The interpretation of this study is my professional opinion. [facility] PROCEDURE OUTPATIENT PROCEDURE    PROCEDURE:  Injection of the LEFT KNEE:  INTRAARTICULAR KNEE INJECTION       I, Wendi Roque MD, have reviewed the History, Physical and updated the Allergic reactions for [place] performed immediately prior to start of procedure:       * Patient was identified by name Nicole Snell and date of birth (1950)  * Agreement on procedure being performed was verified  * Risks and Benefits explained to the patient: see below  * Procedure site verified and marked as necessary  * Patient was positioned for comfort  * Verbal Consent and Written Consent Verified by myself and my office staff.   * Complications: None  *  All patient and/or family questions answered     The procedure was explained to the patient and possible adverse reactions were discussed. These risks included, but not limited to: bleeding, infection, septic arthritis, local skin irritation (skin discoloration, hyperpigmentation, hypopigmentation, thinning of the skin, development of a wound, skin necrosis), synovitis, subcutaneous fat pad atrophy, subcutaneous abscess, tendon rupture, transient hyperglycemia. Infection may occur requiring surgical debridement and hospitalization. All Diabetics instructed to check serum blood sugar levels 3 times a day (day of the injection) due to hyperglycemia induced from cortisone injections. If serum blood sugar level > 250 mg%, Alana Moore instructed to call PCP to determine if any additional measures are needed. Time: 3:08 PM  Date of procedure: 6/1/2021  Procedure performed by: Ishmael Chavira MD  Provider assisted by:  MA  Patient accompanied by: self  How tolerated by patient: tolerated the procedure well with no complications  Comments: none    The LEFT INTRAARTICULAR KNEE  area was prepped and cleansed with: sterile fashion using a following solution:  ALCOHOL AND BETADIENE (NEXT). The injection was administered:  A solution of 40 mg of Kenalog and 4 ml of 1% plain lidocaine% plain was used. The pain assessment score PRIOR/AFTER to the injection: 4 /10 //  too soon at determine due to this immediate injection  /10 pain severity, mild intensity, aching and dull Pain quality    Post injection instructions were given to Karthik Mao: Remove the band aid in 3 hours, Wash site with clean soap, No further dressings there after. Call Ishmael Chavira  [phone] if any: pain, redness, drainage, fever, or any concerns/questions that Karthik Mao may have regarding the injection. Karthik Mao was advised on the signs of infection and instructed to go to the ER if it is office after hours.   Karthik Mao tolerated the injection quite well. Marcia Isabel MD MD  3:08 PM      An electronic signature was used to authenticate this note. Disclaimer: Sections of this note are dictated using utilizing voice recognition software, which may have resulted in some phonetic based errors in grammar and contents. Even though attempts were made to correct all the mistakes, some may have been missed, and remained in the body of the document. If questions arise, please contact our department. Timoteo Isabel may have a reminder for a \"due or due soon\" health maintenance. I have asked that she contact her primary care provider for follow-up on this health maintenance. Melissa Gonzalez, as dictated by Moni Vernon MD  6/1/2021  9:28 AM

## 2021-07-02 DIAGNOSIS — F32.5 MAJOR DEPRESSION IN COMPLETE REMISSION (HCC): ICD-10-CM

## 2021-07-02 DIAGNOSIS — Z63.6 CAREGIVER STRESS: ICD-10-CM

## 2021-07-02 DIAGNOSIS — I10 PRIMARY HYPERTENSION: ICD-10-CM

## 2021-07-02 SDOH — SOCIAL STABILITY - SOCIAL INSECURITY: DEPENDENT RELATIVE NEEDING CARE AT HOME: Z63.6

## 2021-07-05 RX ORDER — AMLODIPINE BESYLATE 10 MG/1
TABLET ORAL
Qty: 90 TABLET | Refills: 1 | OUTPATIENT
Start: 2021-07-05

## 2021-07-05 RX ORDER — SERTRALINE HYDROCHLORIDE 50 MG/1
TABLET, FILM COATED ORAL
Qty: 90 TABLET | Refills: 1 | OUTPATIENT
Start: 2021-07-05

## 2021-07-05 NOTE — TELEPHONE ENCOUNTER
Will refill meds at her 7/27/21 appt. She sh0uld have enough meds to hold her til 8/3/21.      Requested Prescriptions     Refused Prescriptions Disp Refills    sertraline (ZOLOFT) 50 mg tablet [Pharmacy Med Name: SERTRALINE HCL 50 MG Tablet] 90 Tablet 1     Sig: TAKE 1 TABLET EVERY DAY ALONG WITH SERTRALINE 100MG    amLODIPine (NORVASC) 10 mg tablet [Pharmacy Med Name: AMLODIPINE BESYLATE 10 MG Tablet] 90 Tablet 1     Sig: TAKE 1 TABLET EVERY DAY FOR BLOOD PRESSURE

## 2021-07-06 ENCOUNTER — OFFICE VISIT (OUTPATIENT)
Dept: ORTHOPEDIC SURGERY | Age: 71
End: 2021-07-06
Payer: MEDICARE

## 2021-07-06 VITALS
BODY MASS INDEX: 28.77 KG/M2 | WEIGHT: 179 LBS | OXYGEN SATURATION: 96 % | HEIGHT: 66 IN | RESPIRATION RATE: 15 BRPM | HEART RATE: 70 BPM

## 2021-07-06 DIAGNOSIS — M17.12 PRIMARY OSTEOARTHRITIS OF LEFT KNEE: Primary | ICD-10-CM

## 2021-07-06 DIAGNOSIS — M76.61 ACHILLES TENDINITIS OF RIGHT LOWER EXTREMITY: ICD-10-CM

## 2021-07-06 PROCEDURE — 3017F COLORECTAL CA SCREEN DOC REV: CPT | Performed by: ORTHOPAEDIC SURGERY

## 2021-07-06 PROCEDURE — G8536 NO DOC ELDER MAL SCRN: HCPCS | Performed by: ORTHOPAEDIC SURGERY

## 2021-07-06 PROCEDURE — G8419 CALC BMI OUT NRM PARAM NOF/U: HCPCS | Performed by: ORTHOPAEDIC SURGERY

## 2021-07-06 PROCEDURE — 99214 OFFICE O/P EST MOD 30 MIN: CPT | Performed by: ORTHOPAEDIC SURGERY

## 2021-07-06 PROCEDURE — G8399 PT W/DXA RESULTS DOCUMENT: HCPCS | Performed by: ORTHOPAEDIC SURGERY

## 2021-07-06 PROCEDURE — 1090F PRES/ABSN URINE INCON ASSESS: CPT | Performed by: ORTHOPAEDIC SURGERY

## 2021-07-06 PROCEDURE — 1101F PT FALLS ASSESS-DOCD LE1/YR: CPT | Performed by: ORTHOPAEDIC SURGERY

## 2021-07-06 PROCEDURE — G8427 DOCREV CUR MEDS BY ELIG CLIN: HCPCS | Performed by: ORTHOPAEDIC SURGERY

## 2021-07-06 PROCEDURE — G8756 NO BP MEASURE DOC: HCPCS | Performed by: ORTHOPAEDIC SURGERY

## 2021-07-06 PROCEDURE — G8432 DEP SCR NOT DOC, RNG: HCPCS | Performed by: ORTHOPAEDIC SURGERY

## 2021-07-06 NOTE — PROGRESS NOTES
AMBULATORY PROGRESS NOTE      Patient: Jose Gregorio             MRN: [id number]     SSN: [id number] Body mass index is 28.89 kg/m². YOB: 1950     AGE: 66 y.o. EX: female    PCP: Didi Gómez       IMPRESSION //  DIAGNOSIS AND TREATMENT PLAN        Jose Gregorio has a diagnosis of: For elderly apparent she does not want any surgical invention at this current time, for symptomatic right Achilles insertional tendinopathy. She cannot go to physical therapy this current time, for the same reason. DIAGNOSES    1. Primary osteoarthritis of left knee    2. Achilles tendinitis of right lower extremity        Orders Placed This Encounter    Generic Supply Order     Bauerofiend Achillotrain(LMS)        PLAN:    1. I will write a DME order: Leilani Benitez for her right achilles tendon pain. 2. I anticipate providing an AFO brace for her right achilles tendon pain and order a MRI of her right achilles tendon if pain persists. RTO-   6 WEEKS    Jose Gregorio  expresses understanding of the diagnosis, treatment plan, and all of their proposed questions were answered to their satisfaction. Patient education has been provided re the diagnoses. HPI //  [address] IS A 66 y.o. female who is a/an  established patient, presenting to my outpatient office for evaluation of  the following chief complaint(s):     Chief Complaint   Patient presents with    Foot Pain     right heel      At [address] patient reported right achilles tendon and bilateral knee(L>R) pain. Obtained 1/2 view of left knee in the office. I provided a Bauerofiend Achillotrain sleeve for right achilles tendon pain and Neoprene knee sleeve for left knee pain. Administered cortisone injection to left knee. Since LOV patient states the cortisone injection alleviated her left knee pain, however she continues to endorse right heel pain.  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     She notes she has cramping sensations in her right lower leg. Mentions she has an overreactive bladder. Visit Vitals  Pulse 70   Resp 15   Ht 5' 6\" (1.676 m)   Wt 179 lb (81.2 kg)   SpO2 96%   BMI 28.89 kg/m²       Appearance: Alert, well appearing and pleasant patient who is in no distress, oriented to person, place/time, and who follows commands. This patient is accompanied in the examination room by her  self. There is signs of: no dementia  Psychiatric: Affect/mood are appropriate. Speech normal in context and clarity, memory intact grossly, no involuntary movements - tremors. Patient arrives to office via: without assistive device. H EENT (2): Head normocephalic & atraumatic. Eye: pupils are round// EOM are intact // Neck: ROM WNL  // Hearings Intact   Respiratory: Breathing non labored     ANKLE/FOOT right    Gait: shuffling  Tenderness: moderate surgical point, right Achilles  Cutaneous:   WNL. Joint Motion:   WNL  Joint / Tendon Stability: No Ankle or Subtalar instability or joint laxity. No peroneal sublux ability or dislocation  Alignment: neutral Hindfoot,    Neuro Motor/Sensory: NL/NL  Vascular: NL foot/ankle pulses,   Lymphatics: No extremity lymphedema, No calf swelling, no tenderness to calf muscles. CHART REVIEW     Nicole Perez has been experiencing pain and discomfort confirmed as outlined in the pain assessment outlined below.  was reviewed by Colonel Keerthi MD on 7/6/2021.      Pain Assessment  7/6/2021   Location of Pain Foot   Pain Location Comment heel   Location Modifiers Right   Severity of Pain 6   Quality of Pain Aching   Quality of Pain Comment -   Duration of Pain Persistent   Frequency of Pain Constant   Aggravating Factors -   Limiting Behavior -   Relieving Factors -   Relieving Factors Comment -   Result of Injury -        Nicole Perez  has a past medical history of Acquired hypothyroidism (12/22/2015), Allergic rhinitis, Atrophic vaginitis, Back pain, Cystitis, Degenerative disc disease, cervical (7/7/2016), Epigastric pain, Headache(784.0), Hypercholesterolemia, Hyperlipidemia LDL goal <130 (8/8/2016), Hypertension, Mixed incontinence (7/7/2016), Multiple closed fractures of ribs of right side (12/24/2019), Neck pain, Numbness of arm (12/18/06), OAB (overactive bladder) (5/8/2018), LOREN (obstructive sleep apnea), Phlebitis of saphenous vein (4/27/07), Primary hypertension (6/6/2016), Thyroid disease, and Urinary incontinence. Patients is employed at:         Past Medical History:   Diagnosis Date    Acquired hypothyroidism 12/22/2015    Allergic rhinitis     Atrophic vaginitis     Back pain     Cystitis     Degenerative disc disease, cervical 7/7/2016    Epigastric pain     Headache(784.0)     migraine    Hypercholesterolemia     Hyperlipidemia LDL goal <130 8/8/2016    Hypertension     Mixed incontinence 7/7/2016    Multiple closed fractures of ribs of right side 12/24/2019    Neck pain     Numbness of arm 12/18/06    left    OAB (overactive bladder) 5/8/2018    LOREN (obstructive sleep apnea)     no cpap    Phlebitis of saphenous vein 4/27/07    Left    Primary hypertension 6/6/2016    Thyroid disease     hypothyroidism    Urinary incontinence      Past Surgical History:   Procedure Laterality Date    HX CERVICAL DISKECTOMY  6/7/2016    HX COLONOSCOPY  approximately 2013    HX HYSTERECTOMY  approximately 1985    HX LUMBAR DISKECTOMY  2016    HX ORTHOPAEDIC  2006    right knee surgery    [place] PLASTIC SURGERY, NECK       Current Outpatient Medications   Medication Sig    trospium (SANCTURA XL) 60 mg capsule Take 1 Cap by mouth Daily (before breakfast).     varicella-zoster recombinant, PF, (Shingrix, PF,) 50 mcg/0.5 mL susr injection 0.5mL by IntraMUSCular route once now and then repeat in 2-6 months    sertraline (ZOLOFT) 50 mg tablet Take 1 Tab by mouth daily. Take with Zoloft 100mg    sertraline (ZOLOFT) 100 mg tablet Take 1 Tab by mouth daily. Take with Zoloft 50mg    simvastatin (ZOCOR) 20 mg tablet Take 1 Tab by mouth nightly.  amLODIPine (NORVASC) 10 mg tablet Take 1 Tab by mouth daily. For blood pressure    levothyroxine (SYNTHROID) 112 mcg tablet Take 1 Tab by mouth Daily (before breakfast).  vitamin e (E GEMS) 1,000 unit capsule Take 1,000 Units by mouth daily.  ibuprofen (MOTRIN) 200 mg tablet Take 400 mg by mouth every six (6) hours as needed for Pain. (Patient not taking: Reported on 6/1/2021)    polyethylene glycol (MIRALAX) 17 gram/dose powder Take 17 g by mouth daily as needed (For constipation). 1 tablespoon with 8 oz of water daily (Patient not taking: Reported on 6/1/2021)    cholecalciferol, vitamin D3, 2,000 unit tab Take 2,000 Units by mouth daily. (Patient not taking: Reported on 6/1/2021)     No current facility-administered medications for this visit.      No Known Allergies  Social History     Occupational History    Not on file   Tobacco Use    Smoking status: Never Smoker    Smokeless tobacco: Never Used   Substance and Sexual Activity    Alcohol use: No     Comment: rarely    Drug use: No    Sexual activity: Yes     Partners: Male     Birth control/protection: Surgical     Family History   Problem Relation Age of Onset    Hypertension Mother     Heart Disease Father 62    Hypertension Father     Alzheimer Father     Cancer Other         Unsure    Other Other         cerebral aneurysm        DIAGNOSTIC LAB DATA      No results found for: HBA1C, ABH1BLJX, BBY3RESP //   Lab Results   Component Value Date/Time    Glucose 94 01/29/2021 12:00 AM        No results found for: WMY3ZTEJ, EBK8KAQP      Lab Results   Component Value Date/Time    Vitamin D 25-Hydroxy 39 08/18/2010 08:27 AM    VITAMIN D, 25-HYDROXY 34.3 02/05/2019 10:01 AM         REVIEW OF SYSTEMS : 7/6/2021  ALL BELOW ARE Negative except : SEE HPI All other systems reviewed and are negative. 12 point review of systems otherwise negative unless noted in HPI. DIAGNOSTIC IMAGING /ORDERS       Orders Placed This Encounter    Generic Supply Order     Bauerofiend Achillotrain(LMS)         No X ray's were obtained today       I have reviewed the results of the above study. The interpretation of this study is my professional opinion. On this date 07/06/2021 I have spent 30 minutes reviewing previous notes, test results and face to face with the patient discussing the diagnosis and importance of compliance with the treatment plan as well as documenting on the day of the visit. An electronic signature was used to authenticate this note. Disclaimer: Sections of this note are dictated using utilizing voice recognition software, which may have resulted in some phonetic based errors in grammar and contents. Even though attempts were made to correct all the mistakes, some may have been missed, and remained in the body of the document. If questions arise, please contact our department. Fabiola Robles may have a reminder for a \"due or due soon\" health maintenance. I have asked that she contact her primary care provider for follow-up on this health maintenance. Helen Barry, as dictated by Farhat Vernon MD  7/6/2021  9:28 AM

## 2021-07-20 ENCOUNTER — HOSPITAL ENCOUNTER (OUTPATIENT)
Dept: LAB | Age: 71
Discharge: HOME | End: 2021-07-20
Payer: MEDICARE

## 2021-07-20 DIAGNOSIS — E78.5 HYPERLIPIDEMIA LDL GOAL <130: ICD-10-CM

## 2021-07-20 LAB
CHOLEST SERPL-MCNC: 194 MG/DL
HDLC SERPL-MCNC: 44 MG/DL (ref 40–60)
HDLC SERPL: 4.4 {RATIO} (ref 0–5)
LDLC SERPL CALC-MCNC: 131.2 MG/DL (ref 0–100)
LIPID PROFILE,FLP: ABNORMAL
TRIGL SERPL-MCNC: 94 MG/DL (ref ?–150)
VLDLC SERPL CALC-MCNC: 18.8 MG/DL

## 2021-07-20 PROCEDURE — 36415 COLL VENOUS BLD VENIPUNCTURE: CPT

## 2021-07-20 PROCEDURE — 80061 LIPID PANEL: CPT

## 2021-07-27 ENCOUNTER — OFFICE VISIT (OUTPATIENT)
Dept: INTERNAL MEDICINE CLINIC | Age: 71
End: 2021-07-27
Payer: MEDICARE

## 2021-07-27 VITALS
WEIGHT: 178 LBS | HEIGHT: 66 IN | BODY MASS INDEX: 28.61 KG/M2 | TEMPERATURE: 97.3 F | DIASTOLIC BLOOD PRESSURE: 56 MMHG | SYSTOLIC BLOOD PRESSURE: 138 MMHG | HEART RATE: 72 BPM | OXYGEN SATURATION: 97 % | RESPIRATION RATE: 14 BRPM

## 2021-07-27 DIAGNOSIS — E03.9 ACQUIRED HYPOTHYROIDISM: ICD-10-CM

## 2021-07-27 DIAGNOSIS — F32.5 MAJOR DEPRESSION IN COMPLETE REMISSION (HCC): ICD-10-CM

## 2021-07-27 DIAGNOSIS — N39.46 MIXED INCONTINENCE: ICD-10-CM

## 2021-07-27 DIAGNOSIS — R25.2 LEG CRAMPING: ICD-10-CM

## 2021-07-27 DIAGNOSIS — E78.5 HYPERLIPIDEMIA LDL GOAL <130: ICD-10-CM

## 2021-07-27 DIAGNOSIS — Z12.31 BREAST CANCER SCREENING BY MAMMOGRAM: ICD-10-CM

## 2021-07-27 DIAGNOSIS — I10 PRIMARY HYPERTENSION: Primary | ICD-10-CM

## 2021-07-27 DIAGNOSIS — Z63.6 CAREGIVER STRESS: ICD-10-CM

## 2021-07-27 PROCEDURE — G8752 SYS BP LESS 140: HCPCS | Performed by: NURSE PRACTITIONER

## 2021-07-27 PROCEDURE — G8427 DOCREV CUR MEDS BY ELIG CLIN: HCPCS | Performed by: NURSE PRACTITIONER

## 2021-07-27 PROCEDURE — 1101F PT FALLS ASSESS-DOCD LE1/YR: CPT | Performed by: NURSE PRACTITIONER

## 2021-07-27 PROCEDURE — G8399 PT W/DXA RESULTS DOCUMENT: HCPCS | Performed by: NURSE PRACTITIONER

## 2021-07-27 PROCEDURE — 3017F COLORECTAL CA SCREEN DOC REV: CPT | Performed by: NURSE PRACTITIONER

## 2021-07-27 PROCEDURE — G8432 DEP SCR NOT DOC, RNG: HCPCS | Performed by: NURSE PRACTITIONER

## 2021-07-27 PROCEDURE — G8419 CALC BMI OUT NRM PARAM NOF/U: HCPCS | Performed by: NURSE PRACTITIONER

## 2021-07-27 PROCEDURE — 99214 OFFICE O/P EST MOD 30 MIN: CPT | Performed by: NURSE PRACTITIONER

## 2021-07-27 PROCEDURE — G8754 DIAS BP LESS 90: HCPCS | Performed by: NURSE PRACTITIONER

## 2021-07-27 PROCEDURE — 1090F PRES/ABSN URINE INCON ASSESS: CPT | Performed by: NURSE PRACTITIONER

## 2021-07-27 PROCEDURE — G9899 SCRN MAM PERF RSLTS DOC: HCPCS | Performed by: NURSE PRACTITIONER

## 2021-07-27 PROCEDURE — G8536 NO DOC ELDER MAL SCRN: HCPCS | Performed by: NURSE PRACTITIONER

## 2021-07-27 RX ORDER — SERTRALINE HYDROCHLORIDE 100 MG/1
150 TABLET, FILM COATED ORAL DAILY
Qty: 135 TABLET | Refills: 1 | Status: SHIPPED | OUTPATIENT
Start: 2021-07-27 | End: 2022-01-18 | Stop reason: ALTCHOICE

## 2021-07-27 RX ORDER — LEVOTHYROXINE SODIUM 112 UG/1
112 TABLET ORAL
Qty: 90 TABLET | Refills: 1 | Status: SHIPPED | OUTPATIENT
Start: 2021-07-27 | End: 2022-01-18 | Stop reason: SDUPTHER

## 2021-07-27 RX ORDER — AMLODIPINE BESYLATE 10 MG/1
10 TABLET ORAL DAILY
Qty: 90 TABLET | Refills: 1 | Status: SHIPPED | OUTPATIENT
Start: 2021-07-27 | End: 2022-01-18 | Stop reason: SDUPTHER

## 2021-07-27 RX ORDER — SIMVASTATIN 40 MG/1
40 TABLET, FILM COATED ORAL
Qty: 90 TABLET | Refills: 1 | Status: SHIPPED | OUTPATIENT
Start: 2021-07-27 | End: 2022-01-18 | Stop reason: SDUPTHER

## 2021-07-27 SDOH — SOCIAL STABILITY - SOCIAL INSECURITY: DEPENDENT RELATIVE NEEDING CARE AT HOME: Z63.6

## 2021-07-27 NOTE — PROGRESS NOTES
Chief Complaint   Patient presents with    Cholesterol Problem     6 mo f/u    Thyroid Problem    Hypertension    Labs     completed 7/20/2021       1. Have you been to the ER, urgent care clinic since your last visit? Hospitalized since your last visit? No    2. Have you seen or consulted any other health care providers outside of the [address] [place] [address] since your last visit? Include any pap smears or colon screening.  No

## 2021-07-27 NOTE — PROGRESS NOTES
[facility] [address], [address]  [phone] [facility]/[phone] fax    7/27/2021    HPI:   Mark Gilmore 1950 is a pleasant WHITE/NON- female with relevant Hypothyroidism, HTN, cholesterol, caregiver stress, depression, OAB. Todays concern:  Intermittent cramping to LEs. She consumes tea throughout the day with no water. She works in her yard. Macular degeneration: dx June 2021 by Dr Kacy Carmen. Hypothyroidism: She continues Synthroid daily. Denies any signs and symptoms of hypothyroidism. She is tolerating her therapy well.     Hypertension: Continues amlodipine and tolerates well. She denies shortness of breath, chest pain, fatigue, swelling.     Cholesterol: She continues statin therapy, denies myalgia. She does try to focus on consuming less fried fatty foods. She is tolerating the statin well.     Depression: Started in 2020. She takes Zoloft and hiram therapy well. She denies SI/HI tendencies.     Caregiver stress: She cares for her elderly mother who had a stroke. This can be very stressful throughout her day.      OAB: Patient sees Guillermo Rivero, urology. She currently has an InterStim. She continues with urinary incont and goes through 2 large incont pads daily. She never started the [place]is [place] and Ricardo due to the cost. Will reach out to Dr Cindy Carlos for an alternative.     Past Medical History:   Diagnosis Date    Acquired hypothyroidism 12/22/2015    Allergic rhinitis     Atrophic vaginitis     Back pain     Cystitis     Degenerative disc disease, cervical 7/7/2016    Epigastric pain     Headache(784.0)     migraine    Hypercholesterolemia     Hyperlipidemia LDL goal <130 8/8/2016    Hypertension     Mixed incontinence 7/7/2016    Multiple closed fractures of ribs of right side 12/24/2019    Neck pain     Numbness of arm 12/18/06    left    OAB (overactive bladder) 5/8/2018    LOREN (obstructive sleep apnea)     no cpap    Phlebitis of saphenous vein 4/27/07    Left    Primary hypertension 6/6/2016    Thyroid disease     hypothyroidism    Urinary incontinence      Past Surgical History:   Procedure Laterality Date    HX CERVICAL DISKECTOMY  6/7/2016    HX COLONOSCOPY  approximately 2013    HX HYSTERECTOMY  approximately 1985    HX LUMBAR DISKECTOMY  2016    HX ORTHOPAEDIC  2006    right knee surgery    NE PLASTIC SURGERY, NECK       Current Outpatient Medications   Medication Sig    levothyroxine (SYNTHROID) 112 mcg tablet Take 1 Tablet by mouth Daily (before breakfast).  amLODIPine (NORVASC) 10 mg tablet Take 1 Tablet by mouth daily. For blood pressure    sertraline (ZOLOFT) 100 mg tablet Take 1.5 Tablets by mouth daily. Take with Zoloft 50mg    simvastatin (ZOCOR) 40 mg tablet Take 1 Tablet by mouth nightly. Indications: excessive fat in the blood    ibuprofen (MOTRIN) 200 mg tablet Take 400 mg by mouth every six (6) hours as needed for Pain.  polyethylene glycol (MIRALAX) 17 gram/dose powder Take 17 g by mouth daily as needed (For constipation). 1 tablespoon with 8 oz of water daily    cholecalciferol, vitamin D3, 2,000 unit tab Take 2,000 Units by mouth daily.  vitamin e (E GEMS) 1,000 unit capsule Take 1,000 Units by mouth daily. No current facility-administered medications for this visit. Allergies and Intolerances:   No Known Allergies  Family History:   Family History   Problem Relation Age of Onset    Hypertension Mother     Heart Disease Father 62    Hypertension Father     Alzheimer Father     Cancer Other         Unsure    Other Other         cerebral aneurysm     Social History:   She  reports that she has never smoked.  She has never used smokeless tobacco.   Social History     Substance and Sexual Activity   Alcohol Use No    Comment: rarely     Immunization History:  Immunization History   Administered Date(s) Administered    COVID-19, PFIZER, MRNA, LNP-S, PF, 30MCG/0.3ML DOSE 03/06/2021, 03/21/2021    Pneumococcal Conjugate (PCV-13) 08/10/2018    Pneumococcal Polysaccharide (PPSV-23) 05/23/2014    Zoster 04/13/2009       Review of Systems:   As above included in HPI. Otherwise 11 point review of systems negative including constitutional, skin, HENT, eyes, respiratory, cardiovascular, gastrointestinal, genitourinary, musculoskeletal, endocrine, hematologic, allergy, and neurologic. Physical:   Visit Vitals  BP (!) 138/56   Pulse 72   Temp 97.3 °F (36.3 °C) (Temporal)   Resp 14   Ht 5' 6\" (1.676 m)   Wt 178 lb (80.7 kg)   SpO2 97%   BMI 28.73 kg/m²      Wt Readings from Last 3 Encounters:   07/27/21 178 lb (80.7 kg)   07/06/21 179 lb (81.2 kg)   06/01/21 178 lb (80.7 kg)         Exam:   Physical Exam   Body mass index is 28.73 kg/m².      Review of Data:  Labs reviewed: yes  TG 89 to 94   to 131    Impression:  Patient Active Problem List   Diagnosis Code    LOREN (obstructive sleep apnea) G47.33    Acquired hypothyroidism E03.9    Hypovitaminosis D E55.9    Primary hypertension I10    Mixed incontinence N39.46    Hyperlipidemia LDL goal <130 E78.5    OAB (overactive bladder) N32.81    Caregiver stress Z63.6    Plantar fasciitis, bilateral M72.2       Plan:  Primary hypertension  Continue amlodipine 10 mg daily  Reduce sodium in diet  Avoid all pork  Initiate cardio exercise  Increase water intake  Report BP readings greater than 139/89 to office  Recheck CMP level prior to AWV in February    Hyperlipidemia  TG 89 to 94   to 131  Increase Zocor from 20 mg to 40 mg  Recheck lipid level prior to AWV in February    Hypothyroidism  Continue Synthroid therapy  Recheck Thy level prior to AWV in February    Leg cramping  Most likely related to dehydration as patient works in the yard with the heat and only consumes tea which is dehydrating  Instructed patient to reduce tea consumption and increase water consumption  Avoid working in the yard during the hottest time of the day    Caregiver stress  Continue Zoloft 150 mg daily  Encouraged her to find someone who can sit with her mother a couple times a week to allow her to get out of the home and have time for herself. Depression  Continue Zoloft therapy    Mixed incontinence  Not taking sanctura due to cost  Patient to reach out to Dr. Cindy Carlos for alternative    Breast cancer screening  Placed order for mammogram      Reviewed medication and completed medication reconciliation with the patient. Reviewed side effects of medications with the patient. Questions were answered and patient verb understanding.       Follow up 6 months  Labs needed 1 week prior to appt: No      Dr. Carlos Mitchell, AGNP-C, DNP  [facility] [address] [place] [address]

## 2021-08-12 ENCOUNTER — HOSPITAL ENCOUNTER (OUTPATIENT)
Dept: MAMMOGRAPHY | Age: 71
Discharge: HOME | End: 2021-08-12
Attending: NURSE PRACTITIONER
Payer: MEDICARE

## 2021-08-12 PROCEDURE — 77063 BREAST TOMOSYNTHESIS BI: CPT

## 2021-08-13 NOTE — PROGRESS NOTES
[facility] nurses: Please notify pt that her mammogram was normal. Pt will need yearly mammograms.  Thank you

## 2021-12-20 DIAGNOSIS — F32.5 MAJOR DEPRESSION IN COMPLETE REMISSION (HCC): ICD-10-CM

## 2021-12-20 DIAGNOSIS — E03.9 ACQUIRED HYPOTHYROIDISM: ICD-10-CM

## 2021-12-20 DIAGNOSIS — I10 PRIMARY HYPERTENSION: ICD-10-CM

## 2021-12-20 DIAGNOSIS — Z63.6 CAREGIVER STRESS: ICD-10-CM

## 2021-12-20 RX ORDER — LEVOTHYROXINE SODIUM 112 UG/1
112 TABLET ORAL
Qty: 90 TABLET | Refills: 1 | OUTPATIENT
Start: 2021-12-20

## 2021-12-20 RX ORDER — AMLODIPINE BESYLATE 10 MG/1
10 TABLET ORAL DAILY
Qty: 90 TABLET | Refills: 1 | OUTPATIENT
Start: 2021-12-20

## 2021-12-20 RX ORDER — SERTRALINE HYDROCHLORIDE 100 MG/1
TABLET, FILM COATED ORAL
Qty: 135 TABLET | Refills: 1 | OUTPATIENT
Start: 2021-12-20

## 2021-12-20 SDOH — SOCIAL STABILITY - SOCIAL INSECURITY: DEPENDENT RELATIVE NEEDING CARE AT HOME: Z63.6

## 2022-01-18 ENCOUNTER — OFFICE VISIT (OUTPATIENT)
Dept: INTERNAL MEDICINE CLINIC | Age: 72
End: 2022-01-18
Payer: MEDICARE

## 2022-01-18 ENCOUNTER — TELEPHONE (OUTPATIENT)
Dept: INTERNAL MEDICINE CLINIC | Age: 72
End: 2022-01-18

## 2022-01-18 VITALS
WEIGHT: 177 LBS | HEIGHT: 66 IN | HEART RATE: 67 BPM | OXYGEN SATURATION: 99 % | TEMPERATURE: 96.5 F | BODY MASS INDEX: 28.45 KG/M2 | SYSTOLIC BLOOD PRESSURE: 141 MMHG | RESPIRATION RATE: 16 BRPM | DIASTOLIC BLOOD PRESSURE: 62 MMHG

## 2022-01-18 DIAGNOSIS — L57.0 KERATOSIS: ICD-10-CM

## 2022-01-18 DIAGNOSIS — Z63.6 CAREGIVER STRESS: ICD-10-CM

## 2022-01-18 DIAGNOSIS — E03.9 ACQUIRED HYPOTHYROIDISM: ICD-10-CM

## 2022-01-18 DIAGNOSIS — F32.5 MAJOR DEPRESSION IN COMPLETE REMISSION (HCC): ICD-10-CM

## 2022-01-18 DIAGNOSIS — E78.5 HYPERLIPIDEMIA LDL GOAL <130: ICD-10-CM

## 2022-01-18 DIAGNOSIS — E55.9 HYPOVITAMINOSIS D: ICD-10-CM

## 2022-01-18 DIAGNOSIS — I10 PRIMARY HYPERTENSION: Primary | ICD-10-CM

## 2022-01-18 PROCEDURE — G8419 CALC BMI OUT NRM PARAM NOF/U: HCPCS | Performed by: NURSE PRACTITIONER

## 2022-01-18 PROCEDURE — G9899 SCRN MAM PERF RSLTS DOC: HCPCS | Performed by: NURSE PRACTITIONER

## 2022-01-18 PROCEDURE — 99214 OFFICE O/P EST MOD 30 MIN: CPT | Performed by: NURSE PRACTITIONER

## 2022-01-18 PROCEDURE — 3017F COLORECTAL CA SCREEN DOC REV: CPT | Performed by: NURSE PRACTITIONER

## 2022-01-18 PROCEDURE — G8432 DEP SCR NOT DOC, RNG: HCPCS | Performed by: NURSE PRACTITIONER

## 2022-01-18 PROCEDURE — 1101F PT FALLS ASSESS-DOCD LE1/YR: CPT | Performed by: NURSE PRACTITIONER

## 2022-01-18 PROCEDURE — 1090F PRES/ABSN URINE INCON ASSESS: CPT | Performed by: NURSE PRACTITIONER

## 2022-01-18 PROCEDURE — G8427 DOCREV CUR MEDS BY ELIG CLIN: HCPCS | Performed by: NURSE PRACTITIONER

## 2022-01-18 PROCEDURE — G8754 DIAS BP LESS 90: HCPCS | Performed by: NURSE PRACTITIONER

## 2022-01-18 PROCEDURE — G8753 SYS BP > OR = 140: HCPCS | Performed by: NURSE PRACTITIONER

## 2022-01-18 PROCEDURE — G8399 PT W/DXA RESULTS DOCUMENT: HCPCS | Performed by: NURSE PRACTITIONER

## 2022-01-18 PROCEDURE — G8536 NO DOC ELDER MAL SCRN: HCPCS | Performed by: NURSE PRACTITIONER

## 2022-01-18 RX ORDER — PAROXETINE HYDROCHLORIDE 20 MG/1
20 TABLET, FILM COATED ORAL DAILY
Qty: 90 TABLET | Refills: 1 | Status: SHIPPED | OUTPATIENT
Start: 2022-01-18 | End: 2022-07-14

## 2022-01-18 RX ORDER — SERTRALINE HYDROCHLORIDE 100 MG/1
150 TABLET, FILM COATED ORAL DAILY
Qty: 135 TABLET | Refills: 1 | Status: CANCELLED | OUTPATIENT
Start: 2022-01-18

## 2022-01-18 RX ORDER — AMLODIPINE BESYLATE 10 MG/1
10 TABLET ORAL DAILY
Qty: 90 TABLET | Refills: 1 | Status: SHIPPED | OUTPATIENT
Start: 2022-01-18 | End: 2022-07-14

## 2022-01-18 RX ORDER — PAROXETINE HYDROCHLORIDE 20 MG/1
20 TABLET, FILM COATED ORAL DAILY
Qty: 30 TABLET | Refills: 0 | Status: SHIPPED | OUTPATIENT
Start: 2022-01-18 | End: 2022-03-09 | Stop reason: SDUPTHER

## 2022-01-18 RX ORDER — LEVOTHYROXINE SODIUM 112 UG/1
112 TABLET ORAL
Qty: 90 TABLET | Refills: 1 | Status: SHIPPED | OUTPATIENT
Start: 2022-01-18 | End: 2022-07-14

## 2022-01-18 RX ORDER — SIMVASTATIN 40 MG/1
40 TABLET, FILM COATED ORAL
Qty: 90 TABLET | Refills: 1 | Status: SHIPPED | OUTPATIENT
Start: 2022-01-18 | End: 2022-07-14

## 2022-01-18 SDOH — SOCIAL STABILITY - SOCIAL INSECURITY: DEPENDENT RELATIVE NEEDING CARE AT HOME: Z63.6

## 2022-01-18 NOTE — PROGRESS NOTES
[facility] [address]   [address], [address]  [phone] [facility]/[phone] fax    1/18/2022    Chad Brown 1950 is a pleasant [address] female. She is  and cares for her elderly mother. Past Medical History:   Diagnosis Date    Acquired hypothyroidism 12/22/2015    Allergic rhinitis     Atrophic vaginitis     Back pain     Cystitis     Degenerative disc disease, cervical 7/7/2016    Epigastric pain     Headache(784.0)     migraine    Hypercholesterolemia     Hyperlipidemia LDL goal <130 8/8/2016    Hypertension     Mixed incontinence 7/7/2016    Multiple closed fractures of ribs of right side 12/24/2019    Neck pain     Numbness of arm 12/18/06    left    OAB (overactive bladder) 5/8/2018    LOREN (obstructive sleep apnea)     no cpap    Phlebitis of saphenous vein 4/27/07    Left    Primary hypertension 6/6/2016    Thyroid disease     hypothyroidism    Urinary incontinence      Past Surgical History:   Procedure Laterality Date    HX CERVICAL DISKECTOMY  6/7/2016    HX COLONOSCOPY  approximately 2013    HX HYSTERECTOMY  approximately 1985    HX LUMBAR DISKECTOMY  2016    HX ORTHOPAEDIC  2006    right knee surgery    [facility] PLASTIC SURGERY, NECK       Current Outpatient Medications   Medication Sig    simvastatin (ZOCOR) 40 mg tablet Take 1 Tablet by mouth nightly. Indications: excessive fat in the blood    levothyroxine (SYNTHROID) 112 mcg tablet Take 1 Tablet by mouth Daily (before breakfast).  amLODIPine (NORVASC) 10 mg tablet Take 1 Tablet by mouth daily. For blood pressure    PARoxetine (PAXIL) 20 mg tablet Take 1 Tablet by mouth daily.  PARoxetine (PAXIL) 20 mg tablet Take 1 Tablet by mouth daily.  solifenacin (VESICARE) 5 mg tablet Take 1 Tablet by mouth daily.  ibuprofen (MOTRIN) 200 mg tablet Take 400 mg by mouth every six (6) hours as needed for Pain.     polyethylene glycol (MIRALAX) 17 gram/dose powder Take 17 g by mouth daily as needed (For constipation). 1 tablespoon with 8 oz of water daily    cholecalciferol, vitamin D3, 2,000 unit tab Take 2,000 Units by mouth daily. No current facility-administered medications for this visit. Allergies and Intolerances:   No Known Allergies  Family History:   Family History   Problem Relation Age of Onset    Hypertension Mother     Heart Disease Father 62    Hypertension Father     Alzheimer's Disease Father     Cancer Other         Unsure    Other Other         cerebral aneurysm     Social History:   She  reports that she has never smoked. She has never used smokeless tobacco.   Social History     Substance and Sexual Activity   Alcohol Use No    Comment: rarely     Immunization History:  Immunization History   Administered Date(s) Administered    COVID-19, Pfizer Purple top, DILUTE for use, 12+ yrs, 30mcg/0.3mL dose 03/06/2021, 03/21/2021    Pneumococcal Conjugate (PCV-13) 08/10/2018    Pneumococcal Polysaccharide (PPSV-23) 05/23/2014    Zoster 04/13/2009     Todays concerns:  1. Skin growth left shoulder. She saw dermatology who burned the skin growth. Area is healing. She was informed it was not cancerous. 2.  Depression/caregiver stress. Continues to care for her 80year-old mother. Becoming a little more difficult due to having to do complete care. She is trying to arrange with her granddaughter to take her mother back to her home and the granddaughter stay with her for 3 days out of the week to allow the patient and her spouse a break. She is inquiring if there is another antidepressant she could try as she does not believe Zoloft is effective any longer. She increased her dose  from 150 mg to 200 on her own and believes this medication dose is still not effective. 3.  History of vitamin D deficiency. She continues to take vitamin D 2000 units daily.   She is requesting a vitamin D level to be drawn with her next set of labs.      Macular degeneration: dx June 2021 by Dr Charles Houston. Hypothyroidism: She continues Synthroid daily. Denies any signs and symptoms of hypothyroidism. She is tolerating her therapy well.     Hypertension: Continues amlodipine and tolerates well. She denies shortness of breath, chest pain, fatigue, swelling.     Cholesterol: She continues statin therapy, denies myalgia. She does try to focus on consuming less fried fatty foods. She is tolerating the statin well.     Depression/caregiver stress: Started in 2020. She denies SI/HI tendencies.     Caregiver stress: She cares for her elderly mother who had a stroke. This can be very stressful throughout her day.      OAB: Patient sees Sneha Suarez urology. She currently has an InterStim. She continues with urinary incont and goes through 2 large incont pads daily. Review of Systems:   As above included in HPI. Otherwise 11 point review of systems negative including constitutional, skin, HENT, eyes, respiratory, cardiovascular, gastrointestinal, genitourinary, musculoskeletal, endocrine, hematologic, allergy, and neurologic. Review of Data:  N/A    Physical:   Visit Vitals  BP (!) 141/62   Pulse 67   Temp (!) 96.5 °F (35.8 °C) (Temporal)   Resp 16   Ht 5' 6\" (1.676 m)   Wt 177 lb (80.3 kg)   SpO2 99%   BMI 28.57 kg/m²      Wt Readings from Last 3 Encounters:   01/18/22 177 lb (80.3 kg)   07/27/21 178 lb (80.7 kg)   07/06/21 179 lb (81.2 kg)       Exam:   Physical Exam  Constitutional:       Appearance: Normal appearance. She is obese. HENT:      Head: Normocephalic and atraumatic. Right Ear: External ear normal.      Left Ear: External ear normal.   Eyes:      Extraocular Movements: Extraocular movements intact. Conjunctiva/sclera: Conjunctivae normal.   Cardiovascular:      Rate and Rhythm: Normal rate and regular rhythm. Heart sounds: Normal heart sounds.       Comments: No edema noted to renato LEs  Pulmonary:      Effort: Pulmonary effort is normal. No respiratory distress. Breath sounds: Normal breath sounds. No wheezing. Musculoskeletal:         General: Normal range of motion. Cervical back: Normal range of motion. Comments: Gait stable, no limitations noted. Skin:     General: Skin is warm and dry. Comments: Darkened area due to CryoSurgery (removal of keratosis)   Neurological:      Mental Status: She is alert and oriented to person, place, and time. Psychiatric:         Mood and Affect: Mood normal.         Behavior: Behavior normal.        Body mass index is 28.57 kg/m². Plan:    ICD-10-CM ICD-9-CM    1. Primary hypertension  I10 401.9 amLODIPine (NORVASC) 10 mg tablet      METABOLIC PANEL, COMPREHENSIVE   2. Hyperlipidemia LDL goal <130  E78.5 272.4 simvastatin (ZOCOR) 40 mg tablet      LIPID PANEL   3. Acquired hypothyroidism  E03.9 244.9 levothyroxine (SYNTHROID) 112 mcg tablet      TSH 3RD GENERATION   4. Major depression in complete remission (HCC)  F32.5 296.26 PARoxetine (PAXIL) 20 mg tablet      PARoxetine (PAXIL) 20 mg tablet   5. Caregiver stress  Z63.6 V61.49    6. Hypovitaminosis D  E55.9 268.9 VITAMIN D, 25 HYDROXY   7. Keratosis  L57.0 701.1        -Primary hypertension  Continue amlodipine 10 mg daily  Reduce sodium in diet  Avoid all pork  Initiate cardio exercise  Increase water intake  Report BP readings greater than 139/89 to office    -Hyperlipidemia  7/2021  to 131 TG 89 to 94  Continue simvastatin 40 mg daily    -Hypothyroidism  Continue Synthroid therapy    -Caregiver stress  Hopefully her plan to have her family member sit with her mother 3 days a week to give her and her spouse some time to themselves will work out. -Depression  Continue Zoloft therapy well implementing Paxil therapy.   Week 1: Take Zoloft 150 mg Tuesday Thursday Saturday  Week 1: Take Paxil 20 mg Wednesday Friday Sunday  Week 2: Take Zoloft 150 mg Monday Wednesday and Paxil 20 mg all other days  Week 3: Take Zoloft 150 mg Wednesday and Paxil 20 mg all other days  Week 4: Take Paxil 20 mg daily  Educated patient it may take a few weeks for the Paxil to become effective. Educated patient in reference to side effects and dosing as she may increase her Paxil to 2 tablets daily after 2 or 3 weeks. Educated pt on the importance of avoiding or reducing the amt of stress they deal with in a day. Also recommended alone time and exercise to assist with reducing stress. Encouraged pt to follow up with a counselor to work on and discuss problems, develop coping mechanisms, and have someone to leave the problems with. Patient verbalized understanding.    -Vitamin D deficiency  Patient has requested vitamin D level prior to her AWV  Continue vitamin D 2000 units daily    -Keratosis  Had cryosurgery left shoulder  Follow-up with dermatology as noted    -Mixed incontinence  Not taking sanctura due to cost  Continue Vesicare as prescribed       Reviewed medication and completed medication reconciliation with the patient. Reviewed side effects of medications with the patient. Questions were answered and patient verb understanding. Follow up 1 to 2 months AWV with labs (lipid, TSH, CMP, vitamin D)      Dr. Josephine Young, MINP-C, DNP  [facility]     The total time 30 minutes. At least 50% of that time was spent in counseling and/or coordination of care. My summary of patient counseling and coordination of care includes reviewing medical record, assessing patient, placing orders, and discussing plan of care with patient.

## 2022-01-18 NOTE — TELEPHONE ENCOUNTER
Pt here for OV today and at check out she asked about including Vit D labs with her next lab collection.      Stated she takes 2000 units daily and would like to see how she is doing

## 2022-01-18 NOTE — PROGRESS NOTES
Chief Complaint   Patient presents with    Follow-up     6 months     1. \"Have you been to the ER, urgent care clinic since your last visit? Hospitalized since your last visit? \" No    2. \"Have you seen or consulted any other health care providers outside of the [address] [place] [address] since your last visit? \" Yes, [facility] dermatology. 3. For patients aged 39-70: Has the patient had a colonoscopy / FIT/ Cologuard? Yes - no Care Gap present      If the patient is female:    4. For patients aged 41-77: Has the patient had a mammogram within the past 2 years? Yes - no Care Gap present  See top three    5. For patients aged 21-65: Has the patient had a pap smear?  No

## 2022-03-02 ENCOUNTER — APPOINTMENT (OUTPATIENT)
Dept: INTERNAL MEDICINE CLINIC | Age: 72
End: 2022-03-02

## 2022-03-03 LAB
25(OH)D3+25(OH)D2 SERPL-MCNC: 32.5 NG/ML (ref 30–100)
ALBUMIN SERPL-MCNC: 4.2 G/DL (ref 3.7–4.7)
ALBUMIN/GLOB SERPL: 1.6 {RATIO} (ref 1.2–2.2)
ALP SERPL-CCNC: 77 IU/L (ref 44–121)
ALT SERPL-CCNC: 25 IU/L (ref 0–32)
AST SERPL-CCNC: 29 IU/L (ref 0–40)
BILIRUB SERPL-MCNC: 0.4 MG/DL (ref 0–1.2)
BUN SERPL-MCNC: 12 MG/DL (ref 8–27)
BUN/CREAT SERPL: 17 (ref 12–28)
CALCIUM SERPL-MCNC: 9.7 MG/DL (ref 8.7–10.3)
CHLORIDE SERPL-SCNC: 104 MMOL/L (ref 96–106)
CHOLEST SERPL-MCNC: 191 MG/DL (ref 100–199)
CO2 SERPL-SCNC: 23 MMOL/L (ref 20–29)
CREAT SERPL-MCNC: 0.7 MG/DL (ref 0.57–1)
EGFR: 91 ML/MIN/1.73
GLOBULIN SER CALC-MCNC: 2.7 G/DL (ref 1.5–4.5)
GLUCOSE SERPL-MCNC: 93 MG/DL (ref 65–99)
HDLC SERPL-MCNC: 47 MG/DL
IMP & REVIEW OF LAB RESULTS: NORMAL
INTERPRETATION: NORMAL
LDLC SERPL CALC-MCNC: 122 MG/DL (ref 0–99)
POTASSIUM SERPL-SCNC: 4.6 MMOL/L (ref 3.5–5.2)
PROT SERPL-MCNC: 6.9 G/DL (ref 6–8.5)
SODIUM SERPL-SCNC: 143 MMOL/L (ref 134–144)
TRIGL SERPL-MCNC: 124 MG/DL (ref 0–149)
TSH SERPL DL<=0.005 MIU/L-ACNC: 0.55 UIU/ML (ref 0.45–4.5)
VLDLC SERPL CALC-MCNC: 22 MG/DL (ref 5–40)

## 2022-03-09 ENCOUNTER — OFFICE VISIT (OUTPATIENT)
Dept: INTERNAL MEDICINE CLINIC | Age: 72
End: 2022-03-09
Payer: MEDICARE

## 2022-03-09 VITALS
WEIGHT: 183.2 LBS | OXYGEN SATURATION: 100 % | HEART RATE: 71 BPM | RESPIRATION RATE: 15 BRPM | SYSTOLIC BLOOD PRESSURE: 143 MMHG | BODY MASS INDEX: 29.44 KG/M2 | DIASTOLIC BLOOD PRESSURE: 65 MMHG | TEMPERATURE: 97 F | HEIGHT: 66 IN

## 2022-03-09 DIAGNOSIS — I10 PRIMARY HYPERTENSION: ICD-10-CM

## 2022-03-09 DIAGNOSIS — Z71.89 COUNSELING ON HEALTH PROMOTION AND DISEASE PREVENTION: ICD-10-CM

## 2022-03-09 DIAGNOSIS — Z63.6 CAREGIVER STRESS: ICD-10-CM

## 2022-03-09 DIAGNOSIS — E78.5 HYPERLIPIDEMIA LDL GOAL <130: ICD-10-CM

## 2022-03-09 DIAGNOSIS — Z00.00 MEDICARE ANNUAL WELLNESS VISIT, SUBSEQUENT: Primary | ICD-10-CM

## 2022-03-09 DIAGNOSIS — Z12.11 SCREEN FOR COLON CANCER: ICD-10-CM

## 2022-03-09 DIAGNOSIS — Z71.89 ADVANCED DIRECTIVES, COUNSELING/DISCUSSION: ICD-10-CM

## 2022-03-09 DIAGNOSIS — E03.9 ACQUIRED HYPOTHYROIDISM: ICD-10-CM

## 2022-03-09 DIAGNOSIS — N39.46 MIXED INCONTINENCE: ICD-10-CM

## 2022-03-09 DIAGNOSIS — I83.892 VARICOSE VEINS OF LEFT LOWER EXTREMITY WITH EDEMA: ICD-10-CM

## 2022-03-09 DIAGNOSIS — N32.81 OAB (OVERACTIVE BLADDER): ICD-10-CM

## 2022-03-09 DIAGNOSIS — F32.A DEPRESSION, UNSPECIFIED DEPRESSION TYPE: ICD-10-CM

## 2022-03-09 DIAGNOSIS — E55.9 HYPOVITAMINOSIS D: ICD-10-CM

## 2022-03-09 PROCEDURE — 99497 ADVNCD CARE PLAN 30 MIN: CPT | Performed by: NURSE PRACTITIONER

## 2022-03-09 PROCEDURE — 1090F PRES/ABSN URINE INCON ASSESS: CPT | Performed by: NURSE PRACTITIONER

## 2022-03-09 PROCEDURE — 3017F COLORECTAL CA SCREEN DOC REV: CPT | Performed by: NURSE PRACTITIONER

## 2022-03-09 PROCEDURE — G8754 DIAS BP LESS 90: HCPCS | Performed by: NURSE PRACTITIONER

## 2022-03-09 PROCEDURE — G8419 CALC BMI OUT NRM PARAM NOF/U: HCPCS | Performed by: NURSE PRACTITIONER

## 2022-03-09 PROCEDURE — G9899 SCRN MAM PERF RSLTS DOC: HCPCS | Performed by: NURSE PRACTITIONER

## 2022-03-09 PROCEDURE — G8753 SYS BP > OR = 140: HCPCS | Performed by: NURSE PRACTITIONER

## 2022-03-09 PROCEDURE — G8427 DOCREV CUR MEDS BY ELIG CLIN: HCPCS | Performed by: NURSE PRACTITIONER

## 2022-03-09 PROCEDURE — 1101F PT FALLS ASSESS-DOCD LE1/YR: CPT | Performed by: NURSE PRACTITIONER

## 2022-03-09 PROCEDURE — 99214 OFFICE O/P EST MOD 30 MIN: CPT | Performed by: NURSE PRACTITIONER

## 2022-03-09 PROCEDURE — G8536 NO DOC ELDER MAL SCRN: HCPCS | Performed by: NURSE PRACTITIONER

## 2022-03-09 PROCEDURE — G0439 PPPS, SUBSEQ VISIT: HCPCS | Performed by: NURSE PRACTITIONER

## 2022-03-09 PROCEDURE — G8399 PT W/DXA RESULTS DOCUMENT: HCPCS | Performed by: NURSE PRACTITIONER

## 2022-03-09 PROCEDURE — G8432 DEP SCR NOT DOC, RNG: HCPCS | Performed by: NURSE PRACTITIONER

## 2022-03-09 RX ORDER — ERGOCALCIFEROL 1.25 MG/1
50000 CAPSULE ORAL
Qty: 12 CAPSULE | Refills: 2 | Status: SHIPPED | OUTPATIENT
Start: 2022-03-09 | End: 2022-09-21

## 2022-03-09 SDOH — SOCIAL STABILITY - SOCIAL INSECURITY: DEPENDENT RELATIVE NEEDING CARE AT HOME: Z63.6

## 2022-03-09 NOTE — PROGRESS NOTES
This is the Subsequent Medicare Annual Wellness Exam, performed 12 months or more after the Initial AWV or the last Subsequent AWV    I have reviewed the patient's medical history in detail and updated the computerized patient record. Assessment/Plan   Education and counseling provided:  Are appropriate based on today's review and evaluation  Influenza Vaccine  Screening Mammography  Colorectal cancer screening tests  Cardiovascular screening blood test  Bone mass measurement (DEXA)  Screening for glaucoma  Per patient she is doing well overall    1. Medicare annual wellness visit, subsequent  2. Advanced directives, counseling/discussion  -     ADVANCE CARE PLANNING FIRST 27 MINS  -     FULL CODE  3. Screen for colon cancer  -     REFERRAL FOR COLONOSCOPY  4. Primary hypertension  5. Hyperlipidemia LDL goal <130  6. Acquired hypothyroidism  7. Caregiver stress  8. Depression, unspecified depression type  9. Hypovitaminosis D  -     ergocalciferol (ERGOCALCIFEROL) 1,250 mcg (50,000 unit) capsule; Take 1 Capsule by mouth every seven (7) days. Indications: vitamin D deficiency (high dose therapy), Normal, Disp-12 Capsule, R-2  10. Mixed incontinence  11. OAB (overactive bladder)  12. Varicose veins of left lower extremity with edema  -     REFERRAL TO VASCULAR SURGERY  13. Counseling on health promotion and disease prevention       Depression Risk Factor Screening     3 most recent PHQ Screens 3/9/2022   Little interest or pleasure in doing things Not at all   Feeling down, depressed, irritable, or hopeless Not at all   Total Score PHQ 2 0       Alcohol & Drug Abuse Risk Screen    Do you average more than 1 drink per night or more than 7 drinks a week:  No    On any one occasion in the past three months have you have had more than 3 drinks containing alcohol:  No          Functional Ability and Level of Safety    Hearing: Hearing is good. Activities of Daily Living:   The home contains: no safety equipment. Patient does total self care      Ambulation: with no difficulty     Fall Risk:  Fall Risk Assessment, last 12 mths 3/9/2022   Able to walk? Yes   Fall in past 12 months? 0   Do you feel unsteady? 1   Are you worried about falling 1   Is TUG test greater than 12 seconds? 0   Is the gait abnormal? 0   Number of falls in past 12 months -   Fall with injury?  -      Abuse Screen:  Patient is not abused       Cognitive Screening    Has your family/caregiver stated any concerns about your memory: no     Cognitive Screening: Normal - Clock Drawing Test    Health Maintenance Due     Health Maintenance Due   Topic Date Due    DTaP/Tdap/Td series (1 - Tdap) Never done    Shingrix Vaccine Age 50> (1 of 2) Never done    COVID-19 Vaccine (3 - Booster) 08/21/2021    Flu Vaccine (1) Never done    Colorectal Cancer Screening Combo  02/21/2022       Patient Care Team   Patient Care Team:  [facility] KRISTY Stone as PCP - General (Nurse Practitioner)  Lonza BrazilKRISTY as PCP - [address] [place] [address] Dr Pritchardled Provider  Martha Carreon MD (Ophthalmology)  Michael Larios MD (Ophthalmology)  Alexy Garcia MD (Urology)    History     Patient Active Problem List   Diagnosis Code    LOREN (obstructive sleep apnea) G47.33    Acquired hypothyroidism E03.9    Hypovitaminosis D E55.9    Primary hypertension I10    Mixed incontinence N39.46    Hyperlipidemia LDL goal <130 E78.5    OAB (overactive bladder) N32.81    Caregiver stress Z63.6    Plantar fasciitis, bilateral M72.2     Past Medical History:   Diagnosis Date    Acquired hypothyroidism 12/22/2015    Allergic rhinitis     Atrophic vaginitis     Back pain     Cystitis     Degenerative disc disease, cervical 7/7/2016    Epigastric pain     Headache(784.0)     migraine    Hypercholesterolemia     Hyperlipidemia LDL goal <130 8/8/2016    Hypertension     Mixed incontinence 7/7/2016    Multiple closed fractures of ribs of right side 12/24/2019    Neck pain     Numbness of arm 12/18/06    left    OAB (overactive bladder) 5/8/2018    LOREN (obstructive sleep apnea)     no cpap    Phlebitis of saphenous vein 4/27/07    Left    Primary hypertension 6/6/2016    Thyroid disease     hypothyroidism    Urinary incontinence       Past Surgical History:   Procedure Laterality Date    HX CERVICAL DISKECTOMY  6/7/2016    HX COLONOSCOPY  approximately 2013    HX HYSTERECTOMY  approximately 1985    HX LUMBAR DISKECTOMY  2016    HX ORTHOPAEDIC  2006    right knee surgery    IN PLASTIC SURGERY, NECK       Current Outpatient Medications   Medication Sig Dispense Refill    ergocalciferol (ERGOCALCIFEROL) 1,250 mcg (50,000 unit) capsule Take 1 Capsule by mouth every seven (7) days. Indications: vitamin D deficiency (high dose therapy) 12 Capsule 2    simvastatin (ZOCOR) 40 mg tablet Take 1 Tablet by mouth nightly. Indications: excessive fat in the blood 90 Tablet 1    levothyroxine (SYNTHROID) 112 mcg tablet Take 1 Tablet by mouth Daily (before breakfast). 90 Tablet 1    amLODIPine (NORVASC) 10 mg tablet Take 1 Tablet by mouth daily. For blood pressure 90 Tablet 1    PARoxetine (PAXIL) 20 mg tablet Take 1 Tablet by mouth daily. 90 Tablet 1    solifenacin (VESICARE) 5 mg tablet Take 1 Tablet by mouth daily. 90 Tablet 3    ibuprofen (MOTRIN) 200 mg tablet Take 400 mg by mouth every six (6) hours as needed for Pain.  polyethylene glycol (MIRALAX) 17 gram/dose powder Take 17 g by mouth daily as needed (For constipation).  1 tablespoon with 8 oz of water daily (Patient not taking: Reported on 3/9/2022) 235 g 0     No Known Allergies    Family History   Problem Relation Age of Onset    Hypertension Mother     Heart Disease Father 62    Hypertension Father     Alzheimer's Disease Father     Cancer Other         Unsure    Other Other         cerebral aneurysm     Social History     Tobacco Use    Smoking status: Never Smoker    Smokeless tobacco: Never Used Substance Use Topics    Alcohol use: No     Comment: rarely         Gt Perkins, DNP

## 2022-03-09 NOTE — PROGRESS NOTES
[facility] [address] [facility] [place], [address]  [phone] [facility]/[phone] fax    3/9/2022    Dawn Zhang 1950 is a pleasant [address] female. She is  and cares for her elderly mother. Past Medical History:   Diagnosis Date    Acquired hypothyroidism 12/22/2015    Allergic rhinitis     Atrophic vaginitis     Back pain     Cystitis     Degenerative disc disease, cervical 7/7/2016    Epigastric pain     Headache(784.0)     migraine    Hypercholesterolemia     Hyperlipidemia LDL goal <130 8/8/2016    Hypertension     Mixed incontinence 7/7/2016    Multiple closed fractures of ribs of right side 12/24/2019    Neck pain     Numbness of arm 12/18/06    left    OAB (overactive bladder) 5/8/2018    LOREN (obstructive sleep apnea)     no cpap    Phlebitis of saphenous vein 4/27/07    Left    Primary hypertension 6/6/2016    Thyroid disease     hypothyroidism    Urinary incontinence      Past Surgical History:   Procedure Laterality Date    HX CERVICAL DISKECTOMY  6/7/2016    HX COLONOSCOPY  approximately 2013    HX HYSTERECTOMY  approximately 1985    HX LUMBAR DISKECTOMY  2016    HX ORTHOPAEDIC  2006    right knee surgery    NV PLASTIC SURGERY, NECK       Current Outpatient Medications   Medication Sig    simvastatin (ZOCOR) 40 mg tablet Take 1 Tablet by mouth nightly. Indications: excessive fat in the blood    levothyroxine (SYNTHROID) 112 mcg tablet Take 1 Tablet by mouth Daily (before breakfast).  amLODIPine (NORVASC) 10 mg tablet Take 1 Tablet by mouth daily. For blood pressure    PARoxetine (PAXIL) 20 mg tablet Take 1 Tablet by mouth daily.  PARoxetine (PAXIL) 20 mg tablet Take 1 Tablet by mouth daily.  solifenacin (VESICARE) 5 mg tablet Take 1 Tablet by mouth daily.  ibuprofen (MOTRIN) 200 mg tablet Take 400 mg by mouth every six (6) hours as needed for Pain.     polyethylene glycol (MIRALAX) 17 gram/dose powder Take 17 g by mouth daily as needed (For constipation). 1 tablespoon with 8 oz of water daily    cholecalciferol, vitamin D3, 2,000 unit tab Take 2,000 Units by mouth daily. No current facility-administered medications for this visit. Allergies and Intolerances:   No Known Allergies  Family History:   Family History   Problem Relation Age of Onset    Hypertension Mother     Heart Disease Father 62    Hypertension Father     Alzheimer's Disease Father     Cancer Other         Unsure    Other Other         cerebral aneurysm     Social History:   She  reports that she has never smoked. She has never used smokeless tobacco.   Social History     Substance and Sexual Activity   Alcohol Use No    Comment: rarely     Immunization History:  Immunization History   Administered Date(s) Administered    COVID-19, Pfizer Purple top, DILUTE for use, 12+ yrs, 30mcg/0.3mL dose 03/06/2021, 03/21/2021    Pneumococcal Conjugate (PCV-13) 08/10/2018    Pneumococcal Polysaccharide (PPSV-23) 05/23/2014    Zoster 04/13/2009     Todays concerns:  1. Depression/caregiver stress. Continues to care for her 80year-old mother. More difficult due to having to do complete care. Antidepressant was changed from Zoloft to Paxil. She says she feels wonderful. 2.  Left lower extremity swelling. Started approximately 3 weeks ago. She denies trauma. She completed a PVL study that was negative for DVT. She has varicose veins. She underwent a vein ligation/stripping to the right lower extremity in the past.  She has tried elevating along with being active but the swelling remains. Macular degeneration: dx June 2021 by Dr Jarret Sandhu. Hypothyroidism:  continues Synthroid daily. Denies any signs and symptoms of hypothyroidism. She is tolerating her therapy well.     Hypertension: Continues amlodipine and tolerates well.  She denies shortness of breath, chest pain, fatigue, swelling.     Cholesterol: She continues statin therapy, denies myalgia. She is tolerating the statin well.     Depression: Started in 2020. Zoloft no longer effective, now taking Paxil.     Caregiver stress: She cares for her elderly mother who had a stroke. This can be very stressful throughout her day.      OAB: sees , urology. She currently has an InterStim. She continues with urinary incont and goes through 2 large incont pads daily. Review of Systems:   As above included in HPI. Otherwise 11 point review of systems negative including constitutional, skin, HENT, eyes, respiratory, cardiovascular, gastrointestinal, genitourinary, musculoskeletal, endocrine, hematologic, allergy, and neurologic. Review of Data:  Liver/kidneys good  TG  -122  Vit D 32    Physical:   Visit Vitals  BP (!) 143/65   Pulse 71   Temp 97 °F (36.1 °C) (Temporal)   Resp 15   Ht 5' 6\" (1.676 m)   Wt 183 lb 3.2 oz (83.1 kg)   SpO2 100%   BMI 29.57 kg/m²         Exam:   Physical Exam  Vitals and nursing note reviewed. Constitutional:       Appearance: Normal appearance. She is obese. HENT:      Head: Normocephalic and atraumatic. Right Ear: Tympanic membrane, ear canal and external ear normal.      Left Ear: Tympanic membrane, ear canal and external ear normal.   Eyes:      Extraocular Movements: Extraocular movements intact. Conjunctiva/sclera: Conjunctivae normal.   Neck:      Vascular: No carotid bruit. Cardiovascular:      Rate and Rhythm: Normal rate and regular rhythm. Heart sounds: Normal heart sounds. Pulmonary:      Effort: Pulmonary effort is normal. No respiratory distress. Breath sounds: Normal breath sounds. No wheezing. Abdominal:      General: Abdomen is flat. Bowel sounds are normal. There is no distension. Palpations: Abdomen is soft. Tenderness: There is no abdominal tenderness. Musculoskeletal:         General: Normal range of motion.       Cervical back: Normal range of motion and neck supple. Right lower leg: No edema. Left lower leg: Edema (2+ nonpitting edema from the knee to toe) present. Comments: Gait stable   Skin:     General: Skin is warm and dry. Neurological:      General: No focal deficit present. Mental Status: She is alert and oriented to person, place, and time. Psychiatric:         Mood and Affect: Mood normal.         Behavior: Behavior normal.         Thought Content: Thought content normal.         Judgment: Judgment normal.             Plan:    ICD-10-CM ICD-9-CM    1. Medicare annual wellness visit, subsequent  Z00.00 V70.0    2. Advanced directives, counseling/discussion  Z71.89 V65.49 ADVANCE CARE PLANNING FIRST 30 MINS      FULL CODE   3. Screen for colon cancer  Z12.11 V76.51 REFERRAL FOR COLONOSCOPY   4. Primary hypertension  I10 401.9    5. Hyperlipidemia LDL goal <130  E78.5 272.4    6. Acquired hypothyroidism  E03.9 244.9    7. Caregiver stress  Z63.6 V61.49    8. Depression, unspecified depression type  F32. A 311    9. Hypovitaminosis D  E55.9 268.9 ergocalciferol (ERGOCALCIFEROL) 1,250 mcg (50,000 unit) capsule   10. Mixed incontinence  N39.46 788.33    11. OAB (overactive bladder)  N32.81 596.51    12. Varicose veins of left lower extremity with edema  I83.892 454.8 REFERRAL TO VASCULAR SURGERY   13. Counseling on health promotion and disease prevention  Z71.89 V65.49      -MCR/ACP  MCR Wellness: Reviewed all HM and ordered tests/imaging appropriately. Reviewed care teams and medications with updates. Advanced Care Planning: Patient educated on the importance of an advanced care plan and paperwork was given to the patient today. Asked patient to read over the information and bring back at next appointment.     Placed referral to gastroenterology for colon cancer screening  Declines influenza vaccine  Contemplating Covid booster vaccine    -Primary hypertension  Continue amlodipine 10 mg daily  Reduce sodium in diet  Avoid all pork  Initiate cardio exercise  Increase water intake  Report BP readings greater than 139/89 to office  Kidney and liver labs good    -Hyperlipidemia  TG ; -122  continue, Simvastatin 40 mg daily. Will not increase dose at this time as patient wants to work on diet and exercise. Reduce fried fatty or oily foods in diet  Limit red meats, processed foods, and alcohol. Limit fast food  Work on weight reduction  Increase water intake    -Hypothyroidism  TSH 0.55  continue, Synthroid 120 mcg daily  Take Synthroid on an empty stomach first thing in the morning. Avoid eating, drinking (except water) or taking any other medications for at least 45 minutes after taking Synthroid as this may interfere with the absorption/effectiveness of Synthroid.     -Caregiver stress  Continue to work with family to help lessen her load with taking care of her mother full-time. Encourage patient to take time to herself. -Depression  1/18/2022 discontinued Zoloft by way of taper  Initiated Paxil 20 mg  Dose is effective    -Vitamin D deficiency  Vitamin D level 32.5  Discontinue over-the-counter vitamin D  Initiate vitamin D 50,000 weekly  Encouraged calcium supplement, staying active, weightbearing exercise    -Mixed incontinence  Continue Vesicare as prescribed  Specialist managed condition is being evaluated/managed by Dr. Radha Nielsen. No acute findings today meriting change in management plan. -Overactive bladder  Specialist managed condition is being evaluated/managed by Dr. Radha Nielsen. No acute findings today meriting change in management plan. -Left lower extremity edema  2/20/2022 She completed PVL study to her left lower extremity that revealed venous valvular reflux. DVT was ruled out. Noted D-dimer to be slightly elevated. No history of DVTs. She does have varicose veins. Reached out to [email or web address], vascular, requesting a consult. Patient is scheduled for Monday.   Encourage patient to continue to elevate  Avoid sitting for long periods of time  Apply compression stocking    -Counseling on health promotion and disease prevention  Educated patient on the importance of obtaining the Covid vaccine  Discussed the influenza vaccine  Patient agreed to a colonoscopy    Reviewed medication and completed medication reconciliation with the patient. Reviewed side effects of medications with the patient. Questions were answered and patient verb understanding. Follow up 6 months no lab      Dr. Maire Bamberger, AGNP-C, DNP  [facility]         The total time 40 minutes. At least 50% of that time was spent in counseling and/or coordination of care. My summary of patient counseling and coordination of care includes reviewing medical record, assessing patient, placing orders, and discussing plan of care with patient.  Prior to seeing this patient, I reviewed records, including previously completed appointments/records, reviewed specialty records in [facility], and updated visits from other providers since I saw the patient last.

## 2022-03-09 NOTE — PROGRESS NOTES
Chief Complaint   Patient presents with   [facility] Wellness Visit    Labs     completed 3/2/2022     1. \"Have you been to the ER, urgent care clinic since your last visit? Hospitalized since your last visit? \" Yes, for swelling of leg. 2. \"Have you seen or consulted any other health care providers outside of the [address] [place] [address] since your last visit? \" No     3. For patients aged 39-70: Has the patient had a colonoscopy / FIT/ Cologuard? Yes - Care Gap present. Most recent result on file      If the patient is female:    4. For patients aged 41-77: Has the patient had a mammogram within the past 2 years? NA - based on age or sex      11. For patients aged 21-65: Has the patient had a pap smear?  NA - based on age or sex

## 2022-03-09 NOTE — PATIENT INSTRUCTIONS
Medicare Wellness Visit, Female     The best way to live healthy is to have a lifestyle where you eat a well-balanced diet, exercise regularly, limit alcohol use, and quit all forms of tobacco/nicotine, if applicable. Regular preventive services are another way to keep healthy. Preventive services (vaccines, screening tests, monitoring & exams) can help personalize your care plan, which helps you manage your own care. Screening tests can find health problems at the earliest stages, when they are easiest to treat. [facility] follows the current, evidence-based guidelines published by the [facility] ([facility]) when recommending preventive services for our patients. Because we follow these guidelines, sometimes recommendations change over time as research supports it. (For example, mammograms used to be recommended annually. Even though Medicare will still pay for an annual mammogram, the newer guidelines recommend a mammogram every two years for women of average risk). Of course, you and your doctor may decide to screen more often for some diseases, based on your risk and your co-morbidities (chronic disease you are already diagnosed with). Preventive services for you include:  - Medicare offers their members a free annual wellness visit, which is time for you and your primary care provider to discuss and plan for your preventive service needs. Take advantage of this benefit every year!  -All adults over the age of 72 should receive the recommended pneumonia vaccines. Current USPSTF guidelines recommend a series of two vaccines for the best pneumonia protection.   -All adults should have a flu vaccine yearly and a tetanus vaccine every 10 years.   -All adults age 48 and older should receive the shingles vaccines (series of two vaccines).       -All adults age 38-68 who are overweight should have a diabetes screening test once every three years.   -All adults born between 80 and 1965 should be screened once for Hepatitis C.  -Other screening tests and preventive services for persons with diabetes include: an eye exam to screen for diabetic retinopathy, a kidney function test, a foot exam, and stricter control over your cholesterol.   -Cardiovascular screening for adults with routine risk involves an electrocardiogram (ECG) at intervals determined by your doctor.   -Colorectal cancer screenings should be done for adults age 54-65 with no increased risk factors for colorectal cancer. There are a number of acceptable methods of screening for this type of cancer. Each test has its own benefits and drawbacks. Discuss with your doctor what is most appropriate for you during your annual wellness visit. The different tests include: colonoscopy (considered the best screening method), a fecal occult blood test, a fecal DNA test, and sigmoidoscopy.    -A bone mass density test is recommended when a woman turns 65 to screen for osteoporosis. This test is only recommended one time, as a screening. Some providers will use this same test as a disease monitoring tool if you already have osteoporosis. -Breast cancer screenings are recommended every other year for women of normal risk, age 54-69.  -Cervical cancer screenings for women over age 72 are only recommended with certain risk factors. Here is a list of your current Health Maintenance items (your personalized list of preventive services) with a due date:  Health Maintenance Due   Topic Date Due    DTaP/Tdap/Td  (1 - Tdap) Never done    Shingles Vaccine (1 of 2) Never done    COVID-19 Vaccine (3 - Booster) 08/21/2021    Yearly Flu Vaccine (1) Never done    Colorectal Screening  02/21/2022         Medicare Wellness Visit, Female     The best way to live healthy is to have a lifestyle where you eat a well-balanced diet, exercise regularly, limit alcohol use, and quit all forms of tobacco/nicotine, if applicable. Regular preventive services are another way to keep healthy. Preventive services (vaccines, screening tests, monitoring & exams) can help personalize your care plan, which helps you manage your own care. Screening tests can find health problems at the earliest stages, when they are easiest to treat. [facility] follows the current, evidence-based guidelines published by the [facility] ([facility]) when recommending preventive services for our patients. Because we follow these guidelines, sometimes recommendations change over time as research supports it. (For example, mammograms used to be recommended annually. Even though Medicare will still pay for an annual mammogram, the newer guidelines recommend a mammogram every two years for women of average risk). Of course, you and your doctor may decide to screen more often for some diseases, based on your risk and your co-morbidities (chronic disease you are already diagnosed with). Preventive services for you include:  - Medicare offers their members a free annual wellness visit, which is time for you and your primary care provider to discuss and plan for your preventive service needs. Take advantage of this benefit every year!  -All adults over the age of 72 should receive the recommended pneumonia vaccines. Current USPSTF guidelines recommend a series of two vaccines for the best pneumonia protection.   -All adults should have a flu vaccine yearly and a tetanus vaccine every 10 years.   -All adults age 48 and older should receive the shingles vaccines (series of two vaccines).       -All adults age 38-68 who are overweight should have a diabetes screening test once every three years.   -All adults born between 80 and 1965 should be screened once for Hepatitis C.  -Other screening tests and preventive services for persons with diabetes include: an eye exam to screen for diabetic retinopathy, a kidney function test, a foot exam, and stricter control over your cholesterol.   -Cardiovascular screening for adults with routine risk involves an electrocardiogram (ECG) at intervals determined by your doctor.   -Colorectal cancer screenings should be done for adults age 54-65 with no increased risk factors for colorectal cancer. There are a number of acceptable methods of screening for this type of cancer. Each test has its own benefits and drawbacks. Discuss with your doctor what is most appropriate for you during your annual wellness visit. The different tests include: colonoscopy (considered the best screening method), a fecal occult blood test, a fecal DNA test, and sigmoidoscopy.    -A bone mass density test is recommended when a woman turns 65 to screen for osteoporosis. This test is only recommended one time, as a screening. Some providers will use this same test as a disease monitoring tool if you already have osteoporosis. -Breast cancer screenings are recommended every other year for women of normal risk, age 54-69.  -Cervical cancer screenings for women over age 72 are only recommended with certain risk factors.      Here is a list of your current Health Maintenance items (your personalized list of preventive services) with a due date:  Health Maintenance Due   Topic Date Due    DTaP/Tdap/Td  (1 - Tdap) Never done    Shingles Vaccine (1 of 2) Never done    COVID-19 Vaccine (3 - Booster) 08/21/2021    Yearly Flu Vaccine (1) Never done    Colorectal Screening  02/21/2022

## 2022-03-14 ENCOUNTER — OFFICE VISIT (OUTPATIENT)
Dept: VASCULAR SURGERY | Age: 72
End: 2022-03-14
Payer: MEDICARE

## 2022-03-14 VITALS
OXYGEN SATURATION: 99 % | BODY MASS INDEX: 29.44 KG/M2 | DIASTOLIC BLOOD PRESSURE: 72 MMHG | SYSTOLIC BLOOD PRESSURE: 142 MMHG | HEART RATE: 72 BPM | WEIGHT: 183.2 LBS | HEIGHT: 66 IN

## 2022-03-14 DIAGNOSIS — I89.0 LYMPHEDEMA: ICD-10-CM

## 2022-03-14 DIAGNOSIS — M79.89 LEG SWELLING: Primary | ICD-10-CM

## 2022-03-14 PROCEDURE — G8399 PT W/DXA RESULTS DOCUMENT: HCPCS | Performed by: NURSE PRACTITIONER

## 2022-03-14 PROCEDURE — 3017F COLORECTAL CA SCREEN DOC REV: CPT | Performed by: NURSE PRACTITIONER

## 2022-03-14 PROCEDURE — G9899 SCRN MAM PERF RSLTS DOC: HCPCS | Performed by: NURSE PRACTITIONER

## 2022-03-14 PROCEDURE — G8753 SYS BP > OR = 140: HCPCS | Performed by: NURSE PRACTITIONER

## 2022-03-14 PROCEDURE — 99203 OFFICE O/P NEW LOW 30 MIN: CPT | Performed by: NURSE PRACTITIONER

## 2022-03-14 PROCEDURE — G8427 DOCREV CUR MEDS BY ELIG CLIN: HCPCS | Performed by: NURSE PRACTITIONER

## 2022-03-14 PROCEDURE — G8419 CALC BMI OUT NRM PARAM NOF/U: HCPCS | Performed by: NURSE PRACTITIONER

## 2022-03-14 PROCEDURE — 1101F PT FALLS ASSESS-DOCD LE1/YR: CPT | Performed by: NURSE PRACTITIONER

## 2022-03-14 PROCEDURE — G8432 DEP SCR NOT DOC, RNG: HCPCS | Performed by: NURSE PRACTITIONER

## 2022-03-14 PROCEDURE — G8754 DIAS BP LESS 90: HCPCS | Performed by: NURSE PRACTITIONER

## 2022-03-14 PROCEDURE — 1090F PRES/ABSN URINE INCON ASSESS: CPT | Performed by: NURSE PRACTITIONER

## 2022-03-14 PROCEDURE — G8536 NO DOC ELDER MAL SCRN: HCPCS | Performed by: NURSE PRACTITIONER

## 2022-03-14 NOTE — PROGRESS NOTES
1. Have you been to an emergency room or urgent care clinic since your last visit? Yes, [facility]    Hospitalized since your last visit? If yes, where, when, and reason for visit? No    2. Have you seen or consulted any other health care providers outside of the [facility] since your last visit including any procedures, health maintenance items. If yes, where, when and reason for visit?  Yes

## 2022-03-14 NOTE — PROGRESS NOTES
Chief Complaint   Patient presents with    New Patient    Swelling     No relief with compression stocking          Impression and Plan:  74 y.o. female with acquired lymphedema  RTO with IVC duplex to rule out May Thurner syndrome as left leg is significantly larger than right. Refer to [facility]    History and Physical    Carmelita Valerio is a 74y.o. year old female here as an incoming referral for BLE edema. The patient stated that the swelling worsened 4 weeks ago and she is unsure when it officially began. She has been using compression. 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 She denies pain. She does have a large varicosity of her on her right lower extremity. She has a history of phlebitis in the saphenous vein 2007. In addition cervical lumbar discectomy and hysterectomy. She denies a family history of leg swelling. She went to the ER for leg swelling 2/19/2022. She was negative for DVT.     Past Medical History:   Diagnosis Date    Acquired hypothyroidism 12/22/2015    Allergic rhinitis     Atrophic vaginitis     Back pain     Cystitis     Degenerative disc disease, cervical 7/7/2016    Epigastric pain     Headache(784.0)     migraine    Hypercholesterolemia     Hyperlipidemia LDL goal <130 8/8/2016    Hypertension     Mixed incontinence 7/7/2016    Multiple closed fractures of ribs of right side 12/24/2019    Neck pain     Numbness of arm 12/18/06    left    OAB (overactive bladder) 5/8/2018    LOREN (obstructive sleep apnea)     no cpap    Phlebitis of saphenous vein 4/27/07    Left    Primary hypertension 6/6/2016    Thyroid disease     hypothyroidism    Urinary incontinence      Patient Active Problem List   Diagnosis Code    LOREN (obstructive sleep apnea) G47.33    Acquired hypothyroidism E03.9    Hypovitaminosis D E55.9    Primary hypertension I10    Mixed incontinence N39.46    Hyperlipidemia LDL goal <130 E78.5    OAB (overactive bladder) N32.81    Caregiver stress Z63.6    Plantar fasciitis, bilateral M72.2     Past Surgical History:   Procedure Laterality Date    HX CERVICAL DISKECTOMY  6/7/2016    HX COLONOSCOPY  approximately 2013    HX HYSTERECTOMY  approximately 1985    HX LUMBAR DISKECTOMY  2016    HX ORTHOPAEDIC  2006    right knee surgery    [place] PLASTIC SURGERY, NECK       Current Outpatient Medications   Medication Sig Dispense Refill    simvastatin (ZOCOR) 40 mg tablet Take 1 Tablet by mouth nightly. Indications: excessive fat in the blood 90 Tablet 1    levothyroxine (SYNTHROID) 112 mcg tablet Take 1 Tablet by mouth Daily (before breakfast). 90 Tablet 1    amLODIPine (NORVASC) 10 mg tablet Take 1 Tablet by mouth daily. For blood pressure 90 Tablet 1    PARoxetine (PAXIL) 20 mg tablet Take 1 Tablet by mouth daily. 90 Tablet 1    solifenacin (VESICARE) 5 mg tablet Take 1 Tablet by mouth daily. 90 Tablet 3    ibuprofen (MOTRIN) 200 mg tablet Take 400 mg by mouth every six (6) hours as needed for Pain.  polyethylene glycol (MIRALAX) 17 gram/dose powder Take 17 g by mouth daily as needed (For constipation). 1 tablespoon with 8 oz of water daily 235 g 0    ergocalciferol (ERGOCALCIFEROL) 1,250 mcg (50,000 unit) capsule Take 1 Capsule by mouth every seven (7) days.  Indications: vitamin D deficiency (high dose therapy) (Patient not taking: Reported on 3/14/2022) 12 Capsule 2     No Known Allergies  Social History     Socioeconomic History    Marital status:      Spouse name: Not on file    Number of children: Not on file    Years of education: Not on file    Highest education level: Not on file   Occupational History    Not on file   Tobacco Use    Smoking status: Never Smoker    Smokeless tobacco: Never Used   Vaping Use    Vaping Use: Never used   Substance and Sexual Activity    Alcohol use: No     Comment: rarely    Drug use: No    Sexual activity: Yes     Partners: Male     Birth control/protection: Surgical   Other Topics Concern    Not on file   Social History Narrative    Not on file     Social Determinants of Health     Financial Resource Strain:     Difficulty of Paying Living Expenses: Not on file   Food Insecurity:     Worried About Running Out of Food in the Last Year: Not on file    Angus of Food in the Last Year: Not on file   Transportation Needs:     Lack of Transportation (Medical): Not on file    Lack of Transportation (Non-Medical):  Not on file   Physical Activity:     Days of Exercise per Week: Not on file    Minutes of Exercise per Session: Not on file   Stress:     Feeling of Stress : Not on file   Social Connections:     Frequency of Communication with Friends and Family: Not on file    Frequency of Social Gatherings with Friends and Family: Not on file    Attends Zoroastrianism Services: Not on file    Active Member of [address] [place] [address] [employer] or Organizations: Not on file    Attends Club or Organization Meetings: Not on file    Marital Status: Not on file   Intimate Partner Violence:     Fear of Current or Ex-Partner: Not on file    Emotionally Abused: Not on file    Physically Abused: Not on file    Sexually Abused: Not on file   Housing Stability:     Unable to Pay for Housing in the Last Year: Not on file    Number of [place] in the Last Year: Not on file    Unstable Housing in the Last Year: Not on file      Family History   Problem Relation Age of Onset    Hypertension Mother     Heart Disease Father 62    Hypertension Father     Alzheimer's Disease Father     Cancer Other         Unsure    Other Other         cerebral aneurysm       Review of Systems    General: negative for fever   Eyes: negative for vision loss   HENT: negative for cold symptoms   Respiratory negative for shortness of breath   Cardiac: negative for chest pain   Vascular negative for foot pain at night    Gastrointestinal: negative for abdominal pain   Genitourinary: negative for dysuria    Endocrine: negative for excessive thirst   Skin: negative for rash   Neurological: negative for paralysis   Psychiatric: negative for depression          Physical Exam:    Visit Vitals  BP (!) 142/72 (BP 1 Location: Left upper arm, BP Patient Position: Sitting)   Pulse 72   Ht 5' 6\" (1.676 m)   Wt 183 lb 3.2 oz (83.1 kg)   SpO2 99%   BMI 29.57 kg/m²      Constitutional:  Patient is well developed, well nourished, and not distressed. HEENT: atraumatic, normocephalic, wearing a mask. Eyes:   Cunjunctivae clear, no scleral icterus  Neck:   No JVD present. Cardiovascular:  Normal rate, regular rhythm, normal heart sounds. No murmur heard. Pulmonary/Chest: Effort normal .  Extremities: Normal range of motion. BLE edema  Neurological:  he  is alert and oriented x3 . Gait normal. Motor & sensory grossly intact in all 4 limbs. Psych: Appropriate mood and affect. Skin:  Skin is warm and dry. No ulcerations  Pulses: Palpable          The treatment plan was reviewed with the patient in detail. The patient voiced understanding of this plan and all questions and concerns were addressed. The patient agrees with this plan. We discussed the signs and symptoms that would require earlier attention or intervention. I appreciate the opportunity to participate in the care of your patient. I will be sure to keep you informed of any subsequent changes in the treatment plan. If you have any questions or concerns, please feel free to contact me.       Vivien Richard [facility]  Vascular Nurse [address]  [phone]

## 2022-03-18 PROBLEM — M72.2 PLANTAR FASCIITIS, BILATERAL: Status: ACTIVE | Noted: 2021-02-04

## 2022-03-19 PROBLEM — Z63.6 CAREGIVER STRESS: Status: ACTIVE | Noted: 2021-02-04

## 2022-03-19 PROBLEM — N32.81 OAB (OVERACTIVE BLADDER): Status: ACTIVE | Noted: 2018-05-08

## 2022-03-28 ENCOUNTER — OFFICE VISIT (OUTPATIENT)
Dept: VASCULAR SURGERY | Age: 72
End: 2022-03-28
Payer: MEDICARE

## 2022-03-28 VITALS
SYSTOLIC BLOOD PRESSURE: 120 MMHG | DIASTOLIC BLOOD PRESSURE: 62 MMHG | OXYGEN SATURATION: 98 % | HEART RATE: 67 BPM | RESPIRATION RATE: 20 BRPM

## 2022-03-28 DIAGNOSIS — I89.0 LYMPHEDEMA: Primary | ICD-10-CM

## 2022-03-28 DIAGNOSIS — M79.89 LEG SWELLING: ICD-10-CM

## 2022-03-28 PROCEDURE — G8752 SYS BP LESS 140: HCPCS | Performed by: NURSE PRACTITIONER

## 2022-03-28 PROCEDURE — G8432 DEP SCR NOT DOC, RNG: HCPCS | Performed by: NURSE PRACTITIONER

## 2022-03-28 PROCEDURE — 1101F PT FALLS ASSESS-DOCD LE1/YR: CPT | Performed by: NURSE PRACTITIONER

## 2022-03-28 PROCEDURE — 1090F PRES/ABSN URINE INCON ASSESS: CPT | Performed by: NURSE PRACTITIONER

## 2022-03-28 PROCEDURE — G8754 DIAS BP LESS 90: HCPCS | Performed by: NURSE PRACTITIONER

## 2022-03-28 PROCEDURE — G8419 CALC BMI OUT NRM PARAM NOF/U: HCPCS | Performed by: NURSE PRACTITIONER

## 2022-03-28 PROCEDURE — 99213 OFFICE O/P EST LOW 20 MIN: CPT | Performed by: NURSE PRACTITIONER

## 2022-03-28 PROCEDURE — G8399 PT W/DXA RESULTS DOCUMENT: HCPCS | Performed by: NURSE PRACTITIONER

## 2022-03-28 PROCEDURE — 3017F COLORECTAL CA SCREEN DOC REV: CPT | Performed by: NURSE PRACTITIONER

## 2022-03-28 PROCEDURE — G8427 DOCREV CUR MEDS BY ELIG CLIN: HCPCS | Performed by: NURSE PRACTITIONER

## 2022-03-28 PROCEDURE — G9899 SCRN MAM PERF RSLTS DOC: HCPCS | Performed by: NURSE PRACTITIONER

## 2022-03-28 PROCEDURE — G8536 NO DOC ELDER MAL SCRN: HCPCS | Performed by: NURSE PRACTITIONER

## 2022-03-28 NOTE — PROGRESS NOTES
Chief Complaint   Patient presents with    Leg Swelling         Impression and Plan:  74 y.o. female with acquired lymphedema  RTO as needed  Continue with lymphedema garment use. History and Physical    Bulmaro Powers is a 74y.o. year old female here to review her IVC duplex for bilateral lower extremity leg swelling with the left leg being worse than the right. The IVC duplex is normal and shows spontaneous phasic flow without obstruction. Patient states she has an appointment with [facility] tomorrow to have her lymphedema garments fitted. She did have complaints of intermittent bluish discoloration along her left foot. She was informed that this is from spider veins and varicosities and would be best treated with compression. We discussed that if her leg swelling worsens that she can be referred to MLD.         Past Medical History:   Diagnosis Date    Acquired hypothyroidism 12/22/2015    Allergic rhinitis     Atrophic vaginitis     Back pain     Cystitis     Degenerative disc disease, cervical 7/7/2016    Epigastric pain     Headache(784.0)     migraine    Hypercholesterolemia     Hyperlipidemia LDL goal <130 8/8/2016    Hypertension     Mixed incontinence 7/7/2016    Multiple closed fractures of ribs of right side 12/24/2019    Neck pain     Numbness of arm 12/18/06    left    OAB (overactive bladder) 5/8/2018    LOREN (obstructive sleep apnea)     no cpap    Phlebitis of saphenous vein 4/27/07    Left    Primary hypertension 6/6/2016    Thyroid disease     hypothyroidism    Urinary incontinence      Patient Active Problem List   Diagnosis Code    LOREN (obstructive sleep apnea) G47.33    Acquired hypothyroidism E03.9    Hypovitaminosis D E55.9    Primary hypertension I10    Mixed incontinence N39.46    Hyperlipidemia LDL goal <130 E78.5    OAB (overactive bladder) N32.81    Caregiver stress Z63.6    Plantar fasciitis, bilateral M72.2     Past Surgical History: Procedure Laterality Date    HX CERVICAL DISKECTOMY  6/7/2016    HX COLONOSCOPY  approximately 2013    HX HYSTERECTOMY  approximately 1985    HX LUMBAR DISKECTOMY  2016    HX ORTHOPAEDIC  2006    right knee surgery    [place] PLASTIC SURGERY, NECK       Current Outpatient Medications   Medication Sig Dispense Refill    ergocalciferol (ERGOCALCIFEROL) 1,250 mcg (50,000 unit) capsule Take 1 Capsule by mouth every seven (7) days. Indications: vitamin D deficiency (high dose therapy) 12 Capsule 2    simvastatin (ZOCOR) 40 mg tablet Take 1 Tablet by mouth nightly. Indications: excessive fat in the blood 90 Tablet 1    levothyroxine (SYNTHROID) 112 mcg tablet Take 1 Tablet by mouth Daily (before breakfast). 90 Tablet 1    amLODIPine (NORVASC) 10 mg tablet Take 1 Tablet by mouth daily. For blood pressure 90 Tablet 1    PARoxetine (PAXIL) 20 mg tablet Take 1 Tablet by mouth daily. 90 Tablet 1    solifenacin (VESICARE) 5 mg tablet Take 1 Tablet by mouth daily. 90 Tablet 3    ibuprofen (MOTRIN) 200 mg tablet Take 400 mg by mouth every six (6) hours as needed for Pain.  polyethylene glycol (MIRALAX) 17 gram/dose powder Take 17 g by mouth daily as needed (For constipation).  1 tablespoon with 8 oz of water daily (Patient not taking: Reported on 3/28/2022) 235 g 0     No Known Allergies  Social History     Socioeconomic History    Marital status:      Spouse name: Not on file    Number of children: Not on file    Years of education: Not on file    Highest education level: Not on file   Occupational History    Not on file   Tobacco Use    Smoking status: Never Smoker    Smokeless tobacco: Never Used   Vaping Use    Vaping Use: Never used   Substance and Sexual Activity    Alcohol use: No     Comment: rarely    Drug use: No    Sexual activity: Yes     Partners: Male     Birth control/protection: Surgical   Other Topics Concern    Not on file   Social History Narrative    Not on file     Social Determinants of Health     Financial Resource Strain:     Difficulty of Paying Living Expenses: Not on file   Food Insecurity:     Worried About Running Out of Food in the Last Year: Not on file    Angus of Food in the Last Year: Not on file   Transportation Needs:     Lack of Transportation (Medical): Not on file    Lack of Transportation (Non-Medical):  Not on file   Physical Activity:     Days of Exercise per Week: Not on file    Minutes of Exercise per Session: Not on file   Stress:     Feeling of Stress : Not on file   Social Connections:     Frequency of Communication with Friends and Family: Not on file    Frequency of Social Gatherings with Friends and Family: Not on file    Attends Baptism Services: Not on file    Active Member of [address] [place] [address] [employer] or Organizations: Not on file    Attends Club or Organization Meetings: Not on file    Marital Status: Not on file   Intimate Partner Violence:     Fear of Current or Ex-Partner: Not on file    Emotionally Abused: Not on file    Physically Abused: Not on file    Sexually Abused: Not on file   Housing Stability:     Unable to Pay for Housing in the Last Year: Not on file    Number of [place] in the Last Year: Not on file    Unstable Housing in the Last Year: Not on file      Family History   Problem Relation Age of Onset    Hypertension Mother     Heart Disease Father 62    Hypertension Father     Alzheimer's Disease Father     Cancer Other         Unsure    Other Other         cerebral aneurysm       Review of Systems    General: negative for fever   Eyes: negative for vision loss   HENT: negative for cold symptoms   Respiratory negative for shortness of breath   Cardiac: negative for chest pain   Vascular negative for foot pain at night    Gastrointestinal: negative for abdominal pain   Genitourinary: negative for dysuria    Endocrine: negative for excessive thirst   Skin: negative for rash   Neurological: negative for paralysis Psychiatric: negative for depression          Physical Exam:    Visit Vitals  /62 (BP 1 Location: Left upper arm, BP Patient Position: Sitting, BP Cuff Size: Adult)   Pulse 67   Resp 20   SpO2 98%      Constitutional:  Patient is well developed, well nourished, and not distressed. HEENT: atraumatic, normocephalic, wearing a mask. Eyes:   Cunjunctivae clear, no scleral icterus  Neck:   No JVD present. Cardiovascular:  Normal rate, regular rhythm, normal heart sounds. No murmur heard. Pulmonary/Chest: Effort normal .  Extremities: Normal range of motion. BLE edema  Neurological:  he  is alert and oriented x3 . Gait normal. Motor & sensory grossly intact in all 4 limbs. Psych: Appropriate mood and affect. Skin:  Skin is warm and dry. No ulcerations  Pulses: Palpable          The treatment plan was reviewed with the patient in detail. The patient voiced understanding of this plan and all questions and concerns were addressed. The patient agrees with this plan. We discussed the signs and symptoms that would require earlier attention or intervention. I appreciate the opportunity to participate in the care of your patient. I will be sure to keep you informed of any subsequent changes in the treatment plan. If you have any questions or concerns, please feel free to contact me.       Dallis Saint [facility]  Vascular Nurse [address]  [phone]

## 2022-03-28 NOTE — PROGRESS NOTES
1. Have you been to an emergency room or urgent care clinic since your last visit? No     Hospitalized since your last visit? If yes, where, when, and reason for visit? No    2. Have you seen or consulted any other health care providers outside of the [facility] since your last visit including any procedures, health maintenance items. If yes, where, when and reason for visit?  No           3 most recent PHQ Screens 3/28/2022   Little interest or pleasure in doing things Not at all   Feeling down, depressed, irritable, or hopeless Not at all   Total Score PHQ 2 0

## 2022-04-01 PROBLEM — I89.0 LYMPHEDEMA: Status: ACTIVE | Noted: 2022-04-01

## 2022-05-23 ENCOUNTER — TELEPHONE (OUTPATIENT)
Dept: VASCULAR SURGERY | Age: 72
End: 2022-05-23

## 2022-05-23 DIAGNOSIS — M79.89 LEG SWELLING: ICD-10-CM

## 2022-05-23 DIAGNOSIS — I89.0 LYMPHEDEMA: Primary | ICD-10-CM

## 2022-05-23 NOTE — TELEPHONE ENCOUNTER
----- Message from [address] [place] [address] sent at 5/23/2022  1:44 PM EDT -----  Patient was seen back in March, she said Charlie Carreon mentioned possible lymphedema therapy if the swelling got worse. The patient says she's interested in moving forward with therapy. Please call patient to advise.

## 2022-05-23 NOTE — TELEPHONE ENCOUNTER
Contacted patient at this time; patient prefers to be referred to [facility] clinic at [facility]; verbal order by provider to refer patient to [facility] clinic at [facility].

## 2022-05-24 ENCOUNTER — HOSPITAL ENCOUNTER (OUTPATIENT)
Dept: OCCUPATIONAL MEDICINE | Age: 72
Discharge: HOME | End: 2022-05-24
Attending: FAMILY MEDICINE

## 2022-05-24 ENCOUNTER — OFFICE VISIT (OUTPATIENT)
Dept: ORTHOPEDIC SURGERY | Age: 72
End: 2022-05-24
Payer: MEDICARE

## 2022-05-24 VITALS
HEIGHT: 66 IN | HEART RATE: 74 BPM | OXYGEN SATURATION: 98 % | RESPIRATION RATE: 14 BRPM | WEIGHT: 180 LBS | BODY MASS INDEX: 28.93 KG/M2

## 2022-05-24 DIAGNOSIS — M22.2X2 PATELLOFEMORAL DISORDER OF BOTH KNEES: ICD-10-CM

## 2022-05-24 DIAGNOSIS — M17.0 PRIMARY LOCALIZED OSTEOARTHRITIS OF KNEES, BILATERAL: ICD-10-CM

## 2022-05-24 DIAGNOSIS — M17.0 PRIMARY LOCALIZED OSTEOARTHRITIS OF KNEES, BILATERAL: Primary | ICD-10-CM

## 2022-05-24 DIAGNOSIS — M70.52 PES ANSERINUS BURSITIS OF BOTH KNEES: ICD-10-CM

## 2022-05-24 DIAGNOSIS — M70.51 PES ANSERINUS BURSITIS OF BOTH KNEES: ICD-10-CM

## 2022-05-24 DIAGNOSIS — M22.2X1 PATELLOFEMORAL DISORDER OF BOTH KNEES: ICD-10-CM

## 2022-05-24 PROCEDURE — 20611 DRAIN/INJ JOINT/BURSA W/US: CPT | Performed by: FAMILY MEDICINE

## 2022-05-24 PROCEDURE — G8536 NO DOC ELDER MAL SCRN: HCPCS | Performed by: FAMILY MEDICINE

## 2022-05-24 PROCEDURE — G8417 CALC BMI ABV UP PARAM F/U: HCPCS | Performed by: FAMILY MEDICINE

## 2022-05-24 PROCEDURE — 99204 OFFICE O/P NEW MOD 45 MIN: CPT | Performed by: FAMILY MEDICINE

## 2022-05-24 PROCEDURE — G8427 DOCREV CUR MEDS BY ELIG CLIN: HCPCS | Performed by: FAMILY MEDICINE

## 2022-05-24 PROCEDURE — 1101F PT FALLS ASSESS-DOCD LE1/YR: CPT | Performed by: FAMILY MEDICINE

## 2022-05-24 PROCEDURE — G8756 NO BP MEASURE DOC: HCPCS | Performed by: FAMILY MEDICINE

## 2022-05-24 PROCEDURE — G8432 DEP SCR NOT DOC, RNG: HCPCS | Performed by: FAMILY MEDICINE

## 2022-05-24 PROCEDURE — 1090F PRES/ABSN URINE INCON ASSESS: CPT | Performed by: FAMILY MEDICINE

## 2022-05-24 PROCEDURE — 3017F COLORECTAL CA SCREEN DOC REV: CPT | Performed by: FAMILY MEDICINE

## 2022-05-24 PROCEDURE — G8399 PT W/DXA RESULTS DOCUMENT: HCPCS | Performed by: FAMILY MEDICINE

## 2022-05-24 PROCEDURE — G9899 SCRN MAM PERF RSLTS DOC: HCPCS | Performed by: FAMILY MEDICINE

## 2022-05-24 RX ORDER — DICLOFENAC SODIUM 50 MG/1
50 TABLET, DELAYED RELEASE ORAL 2 TIMES DAILY
Qty: 60 TABLET | Refills: 1 | Status: SHIPPED | OUTPATIENT
Start: 2022-05-24 | End: 2022-09-16 | Stop reason: SDUPTHER

## 2022-05-24 RX ORDER — METHYLPREDNISOLONE ACETATE 40 MG/ML
80 INJECTION, SUSPENSION INTRA-ARTICULAR; INTRALESIONAL; INTRAMUSCULAR; SOFT TISSUE ONCE
Status: COMPLETED | OUTPATIENT
Start: 2022-05-24 | End: 2022-05-24

## 2022-05-24 RX ADMIN — METHYLPREDNISOLONE ACETATE 80 MG: 40 INJECTION, SUSPENSION INTRA-ARTICULAR; INTRALESIONAL; INTRAMUSCULAR; SOFT TISSUE at 15:12

## 2022-05-24 NOTE — LETTER
Name:  Beverly Sims   :  1950  MR#:  [id number]   [facility] [address] / PROCEDURE   1. I (we), _____Alana Moore__________ authorize Jens Goodwin DO, FAOASM                       (Patient Name)     (Provider / Que Garcia)   and/or such assistants as may be selected by him/her, to perform the following operation/procedures   _______________inject steroid into both knees pes anserine bursae_____________________  _________________________________________________________________________  Note: If unable to obtain consent prior to an emergent procedure, document the emergent reason in the medical record. This procedure has been explained to my (our) satisfaction and included in the explanation was:   A) the intended benefit, nature, and extent of the procedure to be performed;   B) the significant risks involved and the probability of success;   C) alternative procedures and methods of treatment;   D) the dangers and probable consequences of such alternatives (including no procedure or treatment); E) the expected consequences of the procedure on my future health;   F) whether other qualified individuals would be performing important surgical tasks and / or whether  would be present to advise or support the procedure. I (we) understand that there are other risks of infection and other serious complications in the pre-operative/procedural and postoperative/procedural stages of my (our) care. I (we) have asked all of the questions which I (we) thought were important in deciding whether or not to undergo treatment or diagnosis. These questions have been answered to my (our) satisfaction. I (we) understand that no assurance can be given that the procedure will be a success, and no guarantee or warranty of success has been given to me (us).    2. It has been explained to me (us) that during the course of the operation/procedure, unforeseen conditions may be revealed that necessitate extension of the original procedure(s) or different procedure(s) than those set forth in Paragraph 1. I (we) authorize and request that the above-named physician, his/her assistants or his/her designees, perform procedures as necessary and desirable if deemed to be in my (our) best interest.     3. I acknowledge that other health care personnel may be observing this procedure for the purpose of medical education or other specified purposes as may be necessary as requested and/or approved by my (our) physician. 4. I (we) consent to the disposal by the hospital Pathologist of the removed tissue, parts or organs in accordance with hospital policy. Page 1 of 2          Name:  Lo Newton         :  1950         MR#:  [id number]      4. I do__x__ do not____ consent to the use of a local infiltration pain blocking agent that will be used by my provider/surgical provider to help alleviate pain during my procedure. 6. I do___ do not__X__ consent to an emergent blood transfusion in the case of a life-threatening situation that requires blood components to be administered. This consent is valid for 24 hours from the beginning of the procedure. 7. This patient does ___ or does not __X__currently have a DNR status/order. If DNR order is in place, obtain \"Addendum to the Surgical Consent for ALL Patients with a DNR Order\" to address chase-operative status for limited intervention or DNR suspension. 8. I have read and fully understand the above Consent for Operation/Procedure and that all blanks were completed before I signed the consent.    X____________________________________ _____Alana Moore_________   Date: 2022/_______am/pm   Signature of Patient or legal representative.    _____________________________ ____Chary Leung, ATC____Sandra Wade LPN_____   Date: 6/15/8362/_______ am/pm   Witness to Signature Printed Name Date / Time    (If patient is unable to sign or is a minor, complete the following)     Patient is a minor, ____years of age, or unable to sign because:   _________________________________________________________________________  [facility] If a phone consent is obtained, consent will be documented by using two health care professionals, each affirming that the consenting party has no questions and gives consent for the procedure discussed with the physician/provider.     _________________________________ _______________________________   Date: ______/_____am/pm   2nd witness to phone consent Printed name Date / Time   Informed consent:   I have provided the explanation described above in section 1 to the patient and/or legal representative. I have provided the patient and/or legal representative with an opportunity to ask any questions about the proposed operation/procedure. _                       _ __SILVINO Leggett____ 5/24/2022/_______ am/pm   Provider / Proceduralist Printed Name Date / Time   This Provider / Proceduralist performing the surgery is ONLY for Office-based procedures in [place]   [x] Board certified or Board eligible by one of the [employer], the [employer] [place] [employer], the [employer] of Podiatric Medicine, the [employer] of Foot and Ankle Surgery, or other board as approved by the [facility] for medical staff appointment.    Revised 8/2/2021 Page 2 of 2

## 2022-05-24 NOTE — PROCEDURES
PROCEDURE NOTE:  Time out: 258pm  * Patient was identified by name and date of birth   * Agreement on procedure being performed was verified  * Risks and Benefits explained to the patient  * Procedure site verified and marked as necessary  * Patient was positioned for comfort  * Consent was signed and verified. Risks/benefits including but not limited to bleeding, infection, and scarring discussed and Pt wishes to proceed with procedure. The area was prepped with betadine. Ethyl chloride spray was used. Under sterile technique with ultrasound guidance using Terason uSmart 3200T with 4-15 MHz linear transducer. without ultrasound guidance. 1cc of 40mg/cc methylprednisolone acetate  and 1cc lidocaine were injected into point of maximal tenderness of right knee pes anserine bursa using distal approach. Sterile gauze used to clean the area. Blood loss minimal.  Noticed improvement in pain Sx within 5 minutes (now rated o/10). The area was prepped with betadine. Ethyl chloride spray was used. Under sterile technique with ultrasound guidance using Terason uSmart 3200T with 4-15 MHz linear transducer. without ultrasound guidance. 1cc of 40mg/cc methylprednisolone acetate  and 1cc lidocaine were injected into point of maximal tenderness of left knee pes anserine bursa using distal approach. Sterile gauze used to clean the area. Blood loss minimal.  Noticed improvement in pain Sx within 5 minutes (now rated 1/10). Tolerated procedures well. Discussed possible signs/Sx of infxn, and advised to seek care if concerned. Ultrasound images (if applicable) digitally attached to CPT order in patients chart.

## 2022-05-24 NOTE — PROGRESS NOTES
HISTORY OF PRESENT ILLNESS    Sage De Guzman 1950 is a 74y.o. year old female comes in today as new patient for: knee pain both    Patients symptoms have been present for many years but worse the last few months. Pain level 7/10 anterior/lateral left. It has worsened with walk/stairs. Patient has tried:  heat with benefit. It is described as pain and swell knees and will give out on her. Walks with cane. + swell but + lymphedema left. Aleve helps some. + theater sign B/L    Has had scope done right knee for OA and clean-out Dr. Erick Alvarado years ago. IMAGING: XR knees pending    Past Surgical History:   Procedure Laterality Date    HX CERVICAL DISKECTOMY  6/7/2016    HX COLONOSCOPY  approximately 2013    HX HYSTERECTOMY  approximately 1985    HX LUMBAR DISKECTOMY  2016    HX ORTHOPAEDIC  2006    right knee surgery    [place] PLASTIC SURGERY, NECK       Social History     Socioeconomic History    Marital status:    Tobacco Use    Smoking status: Never Smoker    Smokeless tobacco: Never Used   Vaping Use    Vaping Use: Never used   Substance and Sexual Activity    Alcohol use: No     Comment: rarely    Drug use: No    Sexual activity: Yes     Partners: Male     Birth control/protection: Surgical      Current Outpatient Medications   Medication Sig Dispense Refill    ergocalciferol (ERGOCALCIFEROL) 1,250 mcg (50,000 unit) capsule Take 1 Capsule by mouth every seven (7) days. Indications: vitamin D deficiency (high dose therapy) 12 Capsule 2    simvastatin (ZOCOR) 40 mg tablet Take 1 Tablet by mouth nightly. Indications: excessive fat in the blood 90 Tablet 1    levothyroxine (SYNTHROID) 112 mcg tablet Take 1 Tablet by mouth Daily (before breakfast). 90 Tablet 1    amLODIPine (NORVASC) 10 mg tablet Take 1 Tablet by mouth daily. For blood pressure 90 Tablet 1    PARoxetine (PAXIL) 20 mg tablet Take 1 Tablet by mouth daily.  90 Tablet 1    ibuprofen (MOTRIN) 200 mg tablet Take 400 mg by mouth every six (6) hours as needed for Pain.  polyethylene glycol (MIRALAX) 17 gram/dose powder Take 17 g by mouth daily as needed (For constipation). 1 tablespoon with 8 oz of water daily (Patient not taking: Reported on 5/24/2022) 235 g 0     Past Medical History:   Diagnosis Date    Acquired hypothyroidism 12/22/2015    Allergic rhinitis     Atrophic vaginitis     Back pain     Cystitis     Degenerative disc disease, cervical 7/7/2016    Epigastric pain     Headache(784.0)     migraine    Hypercholesterolemia     Hyperlipidemia LDL goal <130 8/8/2016    Hypertension     Lymphedema 04/01/2022    Mixed incontinence 7/7/2016    Multiple closed fractures of ribs of right side 12/24/2019    Neck pain     Numbness of arm 12/18/06    left    OAB (overactive bladder) 5/8/2018    LOREN (obstructive sleep apnea)     no cpap    Phlebitis of saphenous vein 4/27/07    Left    Primary hypertension 6/6/2016    Thyroid disease     hypothyroidism    Urinary incontinence      Family History   Problem Relation Age of Onset    Hypertension Mother     Heart Disease Father 62    Hypertension Father     Alzheimer's Disease Father     Cancer Other         Unsure    Other Other         cerebral aneurysm         ROS:  See HPI    Objective:  Pulse 74   Resp 14   Ht 5' 6\" (1.676 m)   Wt 180 lb (81.6 kg)   SpO2 98%   BMI 29.05 kg/m²   NEURO:  Sensation intact light touch B/L lower extremities. Reflexes +2/4 patellar and Achilles bilaterally. MS:  LE Strength +5/5 bilateral .   bilateral  Knee:  2+ Effusion . Lachman negative. Valgus negative. Varus negative. negative joint line tenderness medial, lateral.  Yue negative. Posterior drawer negative. Noble compression test negative. Patellar apprehension negative. Patellar facet positive tender to palpation medial, lateral some crepitance bilateral .  Pes anserine positive TTP bilateral     Assessment/Plan:     ICD-10-CM ICD-9-CM    1.  Primary localized osteoarthritis of knees, bilateral  M17.0 715.16 XR KNEE LT MAX 2 VWS      XR KNEE RT MAX 2 VWS      diclofenac EC (VOLTAREN) 50 mg EC tablet   2. Patellofemoral disorder of both knees  M22.2X1 719.96 diclofenac EC (VOLTAREN) 50 mg EC tablet    M22.2X2     3. Pes anserinus bursitis of both knees  M70.51 726.61 ARTHROCENTESIS ASPIR&/INJ MAJOR JT/BURSA W/US    M70.52  diclofenac EC (VOLTAREN) 50 mg EC tablet       Patient (or guardian if minor) verbalizes understanding of evaluation and plan. Will start HEP and Rx for voltaren 50mg afte rinject pes anserine B/L as above and plan follow-up 4 weeks. Consider PT then.

## 2022-06-21 ENCOUNTER — OFFICE VISIT (OUTPATIENT)
Dept: ORTHOPEDIC SURGERY | Age: 72
End: 2022-06-21
Payer: MEDICARE

## 2022-06-21 VITALS
HEART RATE: 74 BPM | HEIGHT: 66 IN | WEIGHT: 178 LBS | OXYGEN SATURATION: 97 % | BODY MASS INDEX: 28.61 KG/M2 | RESPIRATION RATE: 15 BRPM

## 2022-06-21 DIAGNOSIS — M70.51 PES ANSERINUS BURSITIS OF BOTH KNEES: ICD-10-CM

## 2022-06-21 DIAGNOSIS — M70.52 PES ANSERINUS BURSITIS OF BOTH KNEES: ICD-10-CM

## 2022-06-21 DIAGNOSIS — M22.2X2 PATELLOFEMORAL DISORDER OF BOTH KNEES: ICD-10-CM

## 2022-06-21 DIAGNOSIS — M22.2X1 PATELLOFEMORAL DISORDER OF BOTH KNEES: ICD-10-CM

## 2022-06-21 DIAGNOSIS — M17.0 PRIMARY LOCALIZED OSTEOARTHRITIS OF KNEES, BILATERAL: Primary | ICD-10-CM

## 2022-06-21 PROCEDURE — G8427 DOCREV CUR MEDS BY ELIG CLIN: HCPCS | Performed by: FAMILY MEDICINE

## 2022-06-21 PROCEDURE — 99213 OFFICE O/P EST LOW 20 MIN: CPT | Performed by: FAMILY MEDICINE

## 2022-06-21 PROCEDURE — G8536 NO DOC ELDER MAL SCRN: HCPCS | Performed by: FAMILY MEDICINE

## 2022-06-21 PROCEDURE — G9899 SCRN MAM PERF RSLTS DOC: HCPCS | Performed by: FAMILY MEDICINE

## 2022-06-21 PROCEDURE — G8399 PT W/DXA RESULTS DOCUMENT: HCPCS | Performed by: FAMILY MEDICINE

## 2022-06-21 PROCEDURE — 1101F PT FALLS ASSESS-DOCD LE1/YR: CPT | Performed by: FAMILY MEDICINE

## 2022-06-21 PROCEDURE — 3017F COLORECTAL CA SCREEN DOC REV: CPT | Performed by: FAMILY MEDICINE

## 2022-06-21 PROCEDURE — 1090F PRES/ABSN URINE INCON ASSESS: CPT | Performed by: FAMILY MEDICINE

## 2022-06-21 PROCEDURE — G8432 DEP SCR NOT DOC, RNG: HCPCS | Performed by: FAMILY MEDICINE

## 2022-06-21 PROCEDURE — 1123F ACP DISCUSS/DSCN MKR DOCD: CPT | Performed by: FAMILY MEDICINE

## 2022-06-21 PROCEDURE — G8417 CALC BMI ABV UP PARAM F/U: HCPCS | Performed by: FAMILY MEDICINE

## 2022-06-21 PROCEDURE — G8756 NO BP MEASURE DOC: HCPCS | Performed by: FAMILY MEDICINE

## 2022-06-21 NOTE — LETTER
6/21/2022    Patient: Samm Lee   YOB: 1950   Date of Visit: 6/21/2022     Briana Wheat, [address] [address] [facility]    Dear Briana Wheat [place],      Thank you for referring Ms. Samm Lee to [place] [address]. for evaluation. My notes for this consultation are attached. If you have questions, please do not hesitate to call me. I look forward to following your patient along with you.       Sincerely,    Tejinder Toledo, DO

## 2022-06-21 NOTE — PROGRESS NOTES
HISTORY OF PRESENT ILLNESS    Jamie Donahue 1950 is a 74y.o. year old female comes in today to be evaluated and treated for: knee pain    Since last appt has noticed improvement with injected pes anserine B/L but bruise left knee at site. Less feeling of falling and less need of cane. Pain level 0 - No pain/10. Using diclofenac 50mg with benefit and less lymphedema. Doing HEP with ppol noodle for PFS, and pes anserine. Also using Divine pH drops as well about the same time as started diclofenac 50mg. IMAGING: XR knee 5/19/2022  IMPRESSION  1. No acute fracture or dislocation. 2. Significant tricompartmental osteoarthritis as above. Past Surgical History:   Procedure Laterality Date    HX CERVICAL DISKECTOMY  6/7/2016    HX COLONOSCOPY  approximately 2013    HX HYSTERECTOMY  approximately 1985    HX LUMBAR DISKECTOMY  2016    HX ORTHOPAEDIC  2006    right knee surgery    [place] PLASTIC SURGERY, NECK       Social History     Socioeconomic History    Marital status:    Tobacco Use    Smoking status: Never Smoker    Smokeless tobacco: Never Used   Vaping Use    Vaping Use: Never used   Substance and Sexual Activity    Alcohol use: No     Comment: rarely    Drug use: No    Sexual activity: Yes     Partners: Male     Birth control/protection: Surgical     Current Outpatient Medications   Medication Sig Dispense Refill    diclofenac EC (VOLTAREN) 50 mg EC tablet Take 1 Tablet by mouth two (2) times a day. 60 Tablet 1    ergocalciferol (ERGOCALCIFEROL) 1,250 mcg (50,000 unit) capsule Take 1 Capsule by mouth every seven (7) days. Indications: vitamin D deficiency (high dose therapy) 12 Capsule 2    simvastatin (ZOCOR) 40 mg tablet Take 1 Tablet by mouth nightly. Indications: excessive fat in the blood 90 Tablet 1    levothyroxine (SYNTHROID) 112 mcg tablet Take 1 Tablet by mouth Daily (before breakfast). 90 Tablet 1    amLODIPine (NORVASC) 10 mg tablet Take 1 Tablet by mouth daily.  For blood pressure 90 Tablet 1    PARoxetine (PAXIL) 20 mg tablet Take 1 Tablet by mouth daily. 90 Tablet 1    ibuprofen (MOTRIN) 200 mg tablet Take 400 mg by mouth every six (6) hours as needed for Pain.  polyethylene glycol (MIRALAX) 17 gram/dose powder Take 17 g by mouth daily as needed (For constipation). 1 tablespoon with 8 oz of water daily 235 g 0     Past Medical History:   Diagnosis Date    Acquired hypothyroidism 12/22/2015    Allergic rhinitis     Atrophic vaginitis     Back pain     Cystitis     Degenerative disc disease, cervical 7/7/2016    Epigastric pain     Headache(784.0)     migraine    Hypercholesterolemia     Hyperlipidemia LDL goal <130 8/8/2016    Hypertension     Lymphedema 04/01/2022    Mixed incontinence 7/7/2016    Multiple closed fractures of ribs of right side 12/24/2019    Neck pain     Numbness of arm 12/18/06    left    OAB (overactive bladder) 5/8/2018    LOREN (obstructive sleep apnea)     no cpap    Phlebitis of saphenous vein 4/27/07    Left    Primary hypertension 6/6/2016    Thyroid disease     hypothyroidism    Urinary incontinence      Family History   Problem Relation Age of Onset    Hypertension Mother     Heart Disease Father 62    Hypertension Father     Alzheimer's Disease Father     Cancer Other         Unsure    Other Other         cerebral aneurysm         ROS:  Less swell    Objective:  Pulse 74   Resp 15   Ht 5' 6\" (1.676 m)   Wt 178 lb (80.7 kg)   SpO2 97%   BMI 28.73 kg/m²   NEURO:  Sensation intact light touch B/L lower extremities. Reflexes +2/4 patellar and Achilles bilaterally. MS:  LE Strength +5/5 bilateral .   bilateral  Knee:  0-1+ Effusion . Lachman negative. Valgus negative. Varus negative. negative joint line tenderness medial, lateral.  Yue negative. Posterior drawer negative. Noble compression test negative. Patellar apprehension negative.   Patellar facet mild tender to palpation medial, lateral some crepitance bilateral .  Pes anserine minimal TTP bilateral Small ecchymosis left tibia at pes anserine    Assessment/Plan:     ICD-10-CM ICD-9-CM    1. Primary localized osteoarthritis of knees, bilateral  M17.0 715.16    2. Pes anserinus bursitis of both knees  M70.51 726.61     M70.52     3. Patellofemoral disorder of both knees  M22.2X1 719.96     M22.2X2         Patient (or guardian if minor) verbalizes understanding of evaluation and plan. Will continue HEP and Rx for diclofenac 50mg as above and plan follow-up as needed. Trial w/o Divine pH drops to see if benefit.

## 2022-07-14 DIAGNOSIS — E78.5 HYPERLIPIDEMIA LDL GOAL <130: ICD-10-CM

## 2022-07-14 DIAGNOSIS — F32.5 MAJOR DEPRESSION IN COMPLETE REMISSION (HCC): ICD-10-CM

## 2022-07-14 DIAGNOSIS — E03.9 ACQUIRED HYPOTHYROIDISM: ICD-10-CM

## 2022-07-14 DIAGNOSIS — I10 PRIMARY HYPERTENSION: ICD-10-CM

## 2022-07-14 RX ORDER — AMLODIPINE BESYLATE 10 MG/1
TABLET ORAL
Qty: 90 TABLET | Refills: 0 | Status: SHIPPED | OUTPATIENT
Start: 2022-07-14 | End: 2022-09-21 | Stop reason: SDUPTHER

## 2022-07-14 RX ORDER — SIMVASTATIN 40 MG/1
TABLET, FILM COATED ORAL
Qty: 90 TABLET | Refills: 0 | Status: SHIPPED | OUTPATIENT
Start: 2022-07-14 | End: 2022-09-21 | Stop reason: SDUPTHER

## 2022-07-14 RX ORDER — PAROXETINE HYDROCHLORIDE 20 MG/1
TABLET, FILM COATED ORAL
Qty: 90 TABLET | Refills: 0 | Status: SHIPPED | OUTPATIENT
Start: 2022-07-14 | End: 2022-09-21 | Stop reason: DRUGHIGH

## 2022-07-14 RX ORDER — LEVOTHYROXINE SODIUM 112 UG/1
TABLET ORAL
Qty: 90 TABLET | Refills: 0 | Status: SHIPPED | OUTPATIENT
Start: 2022-07-14 | End: 2022-09-21 | Stop reason: SDUPTHER

## 2022-07-14 NOTE — TELEPHONE ENCOUNTER
Requested Prescriptions     Signed Prescriptions Disp Refills    simvastatin (ZOCOR) 40 mg tablet 90 Tablet 0     Sig: TAKE 1 TABLET EVERY NIGHT FOR CHOLESTEROL     Authorizing Provider: MAG HOBBS    levothyroxine (SYNTHROID) 112 mcg tablet 90 Tablet 0     Sig: TAKE 1 TABLET EVERY DAY BEFORE BREAKFAST     Authorizing Provider: MAG HOBBS    amLODIPine (NORVASC) 10 mg tablet 90 Tablet 0     Sig: TAKE 1 TABLET EVERY DAY FOR BLOOD PRESSURE     Authorizing Provider: MAG HOBBS    PARoxetine (PAXIL) 20 mg tablet 90 Tablet 0     Sig: TAKE 1 TABLET EVERY DAY     Authorizing Provider: Corazon Marc

## 2022-07-29 ENCOUNTER — TELEPHONE (OUTPATIENT)
Dept: INTERNAL MEDICINE CLINIC | Age: 72
End: 2022-07-29

## 2022-07-29 NOTE — TELEPHONE ENCOUNTER
Mr. Luc Cruz is calling stating he is positive for COVID and his wife started having symptoms on Weds. Chills, fever, body aches and cough. She has tested negative for COVID but is sure she has it. Wants to know if something can be sent in to [place].

## 2022-07-29 NOTE — TELEPHONE ENCOUNTER
Unfortunately there is no treatment for suspected COVID. She can be treated with over-the-counter Tylenol/Motrin for aches, vitamin C 500 mg daily, zinc, and stay well-hydrated. She may take Delsym over-the-counter cough medicine. This is very effective. If her symptoms worsen I recommend she be evaluated at the urgent care. I hope she feels better soon.

## 2022-08-01 ENCOUNTER — TELEPHONE (OUTPATIENT)
Dept: INTERNAL MEDICINE CLINIC | Age: 72
End: 2022-08-01

## 2022-08-01 DIAGNOSIS — U07.1 COVID-19 VIRUS INFECTION: Primary | ICD-10-CM

## 2022-08-01 NOTE — TELEPHONE ENCOUNTER
Pt says she did test positive for covid on Friday afternoon. 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 Wants to know if you can order the transfusion for her. Wants call asap.

## 2022-08-01 NOTE — TELEPHONE ENCOUNTER
Patient began feeling bad with blody aches, sweats, chills, nausea, vomitting and diarrhea on 7/29/2022 and tested positive with a home test on 5/30/2022 . Patient c/o today having  body aches, nausea,  dry cough , right ear pain,  fatigue, patient denies any fever or dyspnea she and concerned that she has lost 8 pounds last week and the fact that her elderly mother lives with as she is her sole caregiver. Patient is asking to have the Covid transfusion or Paxlovid sentin to [facility] on [address].

## 2022-08-01 NOTE — TELEPHONE ENCOUNTER
Pt is calling again. Says the dept of health told her she is still in the window. Wants to know if you can call in med asap!

## 2022-08-02 RX ORDER — NIRMATRELVIR AND RITONAVIR 300-100 MG
3 KIT ORAL EVERY 12 HOURS
Qty: 1 BOX | Refills: 0 | Status: SHIPPED | OUTPATIENT
Start: 2022-08-02 | End: 2022-08-07

## 2022-08-02 NOTE — TELEPHONE ENCOUNTER
Patient advised Paxlovid has been sent to her pharmacy and to hold the simvastatin while taking Paxlovid.

## 2022-08-02 NOTE — TELEPHONE ENCOUNTER
Pls inform pt I have sent in Paxlovid. She needs to hol the simvastatin while taking Paxlovid.  Thank you

## 2022-09-12 ENCOUNTER — PATIENT MESSAGE (OUTPATIENT)
Dept: INTERNAL MEDICINE CLINIC | Age: 72
End: 2022-09-12

## 2022-09-12 NOTE — TELEPHONE ENCOUNTER
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 Seems I'm having a rough time, was wondering if I can increase my Paxil or get a prescription that will help with occasional anxiety. Hope you can help me out quickly.  Thanks

## 2022-09-12 NOTE — TELEPHONE ENCOUNTER
May increase Paxil 20 mg to 2 tabs daily. Will discuss effectiveness of increased dose at 9/21/2022 appointment.   Thank you

## 2022-09-16 DIAGNOSIS — M22.2X1 PATELLOFEMORAL DISORDER OF BOTH KNEES: ICD-10-CM

## 2022-09-16 DIAGNOSIS — M17.0 PRIMARY LOCALIZED OSTEOARTHRITIS OF KNEES, BILATERAL: ICD-10-CM

## 2022-09-16 DIAGNOSIS — M22.2X2 PATELLOFEMORAL DISORDER OF BOTH KNEES: ICD-10-CM

## 2022-09-16 DIAGNOSIS — M70.52 PES ANSERINUS BURSITIS OF BOTH KNEES: ICD-10-CM

## 2022-09-16 DIAGNOSIS — M70.51 PES ANSERINUS BURSITIS OF BOTH KNEES: ICD-10-CM

## 2022-09-16 RX ORDER — DICLOFENAC SODIUM 50 MG/1
50 TABLET, DELAYED RELEASE ORAL 2 TIMES DAILY
Qty: 60 TABLET | Refills: 1 | Status: SHIPPED | OUTPATIENT
Start: 2022-09-16 | End: 2022-11-01 | Stop reason: SDUPTHER

## 2022-09-16 NOTE — TELEPHONE ENCOUNTER
Last Visit: 6/21/22 with MD Hilda Simon  Next Appointment: Advised to follow-up PRN  Previous Refill Encounter(s): 5/24/22 #60 with 1 refill    Requested Prescriptions     Pending Prescriptions Disp Refills    diclofenac EC (VOLTAREN) 50 mg EC tablet 60 Tablet 1     Sig: Take 1 Tablet by mouth two (2) times a day. For [address] [facility] in place:   Recommendation Provided To:    Intervention Detail: New Rx: 1, reason: Patient Preference  Gap Closed?:   Intervention Accepted By:   Time Spent (min): 5

## 2022-09-21 ENCOUNTER — OFFICE VISIT (OUTPATIENT)
Dept: INTERNAL MEDICINE CLINIC | Age: 72
End: 2022-09-21
Payer: MEDICARE

## 2022-09-21 VITALS
HEART RATE: 60 BPM | BODY MASS INDEX: 28.61 KG/M2 | OXYGEN SATURATION: 97 % | SYSTOLIC BLOOD PRESSURE: 129 MMHG | DIASTOLIC BLOOD PRESSURE: 65 MMHG | RESPIRATION RATE: 18 BRPM | TEMPERATURE: 97.4 F | HEIGHT: 66 IN | WEIGHT: 178 LBS

## 2022-09-21 DIAGNOSIS — E78.5 HYPERLIPIDEMIA LDL GOAL <130: ICD-10-CM

## 2022-09-21 DIAGNOSIS — E55.9 HYPOVITAMINOSIS D: ICD-10-CM

## 2022-09-21 DIAGNOSIS — I10 PRIMARY HYPERTENSION: Primary | ICD-10-CM

## 2022-09-21 DIAGNOSIS — Z63.6 CAREGIVER STRESS: ICD-10-CM

## 2022-09-21 DIAGNOSIS — F41.9 ANXIETY: ICD-10-CM

## 2022-09-21 DIAGNOSIS — E03.9 ACQUIRED HYPOTHYROIDISM: ICD-10-CM

## 2022-09-21 PROCEDURE — G8536 NO DOC ELDER MAL SCRN: HCPCS | Performed by: NURSE PRACTITIONER

## 2022-09-21 PROCEDURE — 99213 OFFICE O/P EST LOW 20 MIN: CPT | Performed by: NURSE PRACTITIONER

## 2022-09-21 PROCEDURE — G8752 SYS BP LESS 140: HCPCS | Performed by: NURSE PRACTITIONER

## 2022-09-21 PROCEDURE — 3017F COLORECTAL CA SCREEN DOC REV: CPT | Performed by: NURSE PRACTITIONER

## 2022-09-21 PROCEDURE — 1090F PRES/ABSN URINE INCON ASSESS: CPT | Performed by: NURSE PRACTITIONER

## 2022-09-21 PROCEDURE — G8399 PT W/DXA RESULTS DOCUMENT: HCPCS | Performed by: NURSE PRACTITIONER

## 2022-09-21 PROCEDURE — G8417 CALC BMI ABV UP PARAM F/U: HCPCS | Performed by: NURSE PRACTITIONER

## 2022-09-21 PROCEDURE — G8754 DIAS BP LESS 90: HCPCS | Performed by: NURSE PRACTITIONER

## 2022-09-21 PROCEDURE — G9899 SCRN MAM PERF RSLTS DOC: HCPCS | Performed by: NURSE PRACTITIONER

## 2022-09-21 PROCEDURE — G8427 DOCREV CUR MEDS BY ELIG CLIN: HCPCS | Performed by: NURSE PRACTITIONER

## 2022-09-21 PROCEDURE — 1123F ACP DISCUSS/DSCN MKR DOCD: CPT | Performed by: NURSE PRACTITIONER

## 2022-09-21 PROCEDURE — G8432 DEP SCR NOT DOC, RNG: HCPCS | Performed by: NURSE PRACTITIONER

## 2022-09-21 PROCEDURE — 1101F PT FALLS ASSESS-DOCD LE1/YR: CPT | Performed by: NURSE PRACTITIONER

## 2022-09-21 RX ORDER — AMLODIPINE BESYLATE 10 MG/1
TABLET ORAL
Qty: 90 TABLET | Refills: 1 | Status: SHIPPED | OUTPATIENT
Start: 2022-09-21

## 2022-09-21 RX ORDER — ERGOCALCIFEROL 1.25 MG/1
50000 CAPSULE ORAL
Qty: 12 CAPSULE | Refills: 2 | Status: SHIPPED | OUTPATIENT
Start: 2022-09-21

## 2022-09-21 RX ORDER — LEVOTHYROXINE SODIUM 112 UG/1
112 TABLET ORAL
Qty: 90 TABLET | Refills: 1 | Status: SHIPPED | OUTPATIENT
Start: 2022-09-21

## 2022-09-21 RX ORDER — PAROXETINE HYDROCHLORIDE 40 MG/1
40 TABLET, FILM COATED ORAL DAILY
Qty: 90 TABLET | Refills: 1 | Status: SHIPPED | OUTPATIENT
Start: 2022-09-21

## 2022-09-21 RX ORDER — SIMVASTATIN 40 MG/1
TABLET, FILM COATED ORAL
Qty: 90 TABLET | Refills: 1 | Status: SHIPPED | OUTPATIENT
Start: 2022-09-21

## 2022-09-21 SDOH — SOCIAL STABILITY - SOCIAL INSECURITY: DEPENDENT RELATIVE NEEDING CARE AT HOME: Z63.6

## 2022-09-21 NOTE — PROGRESS NOTES
[facility] [id number] [place] [address]  [place], [address]  [phone] KEISHA/[phone] fax    9/21/2022    Katelynn Kris 1950 is a pleasant [address] [facility] female. Todays concerns/HPI:  Anxiety. While she was ill with Covid she was thinking about who would care for her elder mother if something happens to her. She thought about completing a will. The covid illness open her eyes to what she needs to take care of and this is increasing her anxiety. She is inquiring about PRN meds. She continue celexa 40mg daily. She believes this helps. Caregiver stress. 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 She gets a small break when another family takes on responsibility of the elder. She has thought about nursing home placement but knows mother will not be happy. Urinary incont. Chronic issue. Interstim adjusted 6m ago. Not real improvement noted. She will reach out to rep for interstim and discuss options. Taking meds as prescribed and tolerating w/o side effects. Past Medical History:   Diagnosis Date    Acquired hypothyroidism 12/22/2015    Allergic rhinitis     Atrophic vaginitis     Back pain     Cystitis     Degenerative disc disease, cervical 7/7/2016    Epigastric pain     Headache(784.0)     migraine    Hypercholesterolemia     Hyperlipidemia LDL goal <130 8/8/2016    Hypertension     Lymphedema 04/01/2022    Mixed incontinence 7/7/2016    Multiple closed fractures of ribs of right side 12/24/2019    Neck pain     Numbness of arm 12/18/06    left    OAB (overactive bladder) 5/8/2018    LOREN (obstructive sleep apnea)     no cpap    Phlebitis of saphenous vein 4/27/07    Left    Primary hypertension 6/6/2016    Thyroid disease     hypothyroidism    Urinary incontinence      Current Outpatient Medications   Medication Sig    PARoxetine (PAXIL) 40 mg tablet Take 1 Tablet by mouth daily.     amLODIPine (NORVASC) 10 mg tablet TAKE 1 TABLET EVERY DAY FOR BLOOD PRESSURE    levothyroxine (SYNTHROID) 112 mcg tablet Take 1 Tablet by mouth Daily (before breakfast). simvastatin (ZOCOR) 40 mg tablet TAKE 1 TABLET EVERY NIGHT FOR CHOLESTEROL    ergocalciferol (ERGOCALCIFEROL) 1,250 mcg (50,000 unit) capsule Take 1 Capsule by mouth every seven (7) days. Indications: vitamin D deficiency (high dose therapy)    diclofenac EC (VOLTAREN) 50 mg EC tablet Take 1 Tablet by mouth two (2) times a day. ibuprofen (MOTRIN) 200 mg tablet Take 400 mg by mouth every six (6) hours as needed for Pain. No current facility-administered medications for this visit. Review of Systems:  Pertinent items are noted in HPI. Physical:   Visit Vitals  /65   Pulse 60   Temp 97.4 °F (36.3 °C) (Temporal)   Resp 18   Ht 5' 6\" (1.676 m)   Wt 178 lb (80.7 kg)   SpO2 97%   BMI 28.73 kg/m²      Wt Readings from Last 3 Encounters:   09/21/22 178 lb (80.7 kg)   06/21/22 178 lb (80.7 kg)   05/24/22 180 lb (81.6 kg)       Exam:   Physical Exam  Constitutional:       Appearance: Normal appearance. HENT:      Head: Normocephalic and atraumatic. Cardiovascular:      Rate and Rhythm: Normal rate and regular rhythm. Pulmonary:      Effort: Pulmonary effort is normal. No respiratory distress. Breath sounds: Normal breath sounds. No wheezing. Musculoskeletal:         General: Normal range of motion. Skin:     General: Skin is warm and dry. Neurological:      General: No focal deficit present. Mental Status: She is alert and oriented to person, place, and time. Psychiatric:         Mood and Affect: Mood normal.      Comments: Appears stressed      Body mass index is 28.73 kg/m². Review of Data:  N/a2    Plan:    1. Primary hypertension  Well controlled  Refilled    - amLODIPine (NORVASC) 10 mg tablet; TAKE 1 TABLET EVERY DAY FOR BLOOD PRESSURE  Dispense: 90 Tablet; Refill: 1    2.  Hyperlipidemia LDL goal <130  Refilled    - simvastatin (ZOCOR) 40 mg tablet; TAKE 1 TABLET EVERY NIGHT FOR CHOLESTEROL  Dispense: 90 Tablet; Refill: 1    3. Acquired hypothyroidism  Refilled    - levothyroxine (SYNTHROID) 112 mcg tablet; Take 1 Tablet by mouth Daily (before breakfast). Dispense: 90 Tablet; Refill: 1    4. Anxiety  Much discussion had in reference to her anxiety. Discussed treatment options to include PRN meds. Discussed pros and cons of benzodiazepines. Will continue celexa for now. Pt declines psych or counseling referral.     - PARoxetine (PAXIL) 40 mg tablet; Take 1 Tablet by mouth daily. Dispense: 90 Tablet; Refill: 1    5. Caregiver stress  Discussed with pt options to help relieve herself of some burden. Discussed support groups, counseling, the days she is free of her mother duties she needs to do activities that are entertaining and relaxing for her. Call on others to help prior to becoming overwhelmed. - PARoxetine (PAXIL) 40 mg tablet; Take 1 Tablet by mouth daily. Dispense: 90 Tablet; Refill: 1    6. Hypovitaminosis D    - ergocalciferol (ERGOCALCIFEROL) 1,250 mcg (50,000 unit) capsule; Take 1 Capsule by mouth every seven (7) days. Indications: vitamin D deficiency (high dose therapy)  Dispense: 12 Capsule; Refill: 2      Reviewed medication and completed medication reconciliation with the patient. Reviewed side effects of medications with the patient. Questions were answered and patient verb understanding.     Past Surgical History:   Procedure Laterality Date    HX CERVICAL DISKECTOMY  6/7/2016    HX COLONOSCOPY  approximately 2013    HX HYSTERECTOMY  approximately 1985    HX LUMBAR DISKECTOMY  2016    HX ORTHOPAEDIC  2006    right knee surgery    [place] PLASTIC SURGERY, NECK       Allergies and Intolerances:   No Known Allergies  Family History:   Family History   Problem Relation Age of Onset    Hypertension Mother     Heart Disease Father 62    Hypertension Father     Alzheimer's Disease Father     Cancer Other         Unsure    Other Other         cerebral aneurysm Social History:   She  reports that she has never smoked.  She has never used smokeless tobacco.   Social History     Substance and Sexual Activity   Alcohol Use No    Comment: rarely     Immunization History:  Immunization History   Administered Date(s) Administered    COVID-19, PFIZER PURPLE top, DILUTE for use, (age 15 y+), IM, 30mcg/0.3mL 03/06/2021, 03/21/2021    Pneumococcal Conjugate (PCV-13) 08/10/2018    Pneumococcal Polysaccharide (PPSV-23) 05/23/2014    Zoster 04/13/2009         Follow up 6 month AWV (CMP, Lipid, TSH, Vit D)      Dr. Mateo Benitez, AGNP-C, DNP  [facility] [address] [place] [address]

## 2022-09-21 NOTE — PROGRESS NOTES
Chief Complaint   Patient presents with    Hypertension     6 month f/u    Thyroid Problem     1. \"Have you been to the ER, urgent care clinic since your last visit? Hospitalized since your last visit? \" No    2. \"Have you seen or consulted any other health care providers outside of the [address] [place] [address] since your last visit? \" No     3. For patients aged 39-70: Has the patient had a colonoscopy / FIT/ Cologuard? Yes - Care Gap present. Most recent result on file      If the patient is female:    4. For patients aged 41-77: Has the patient had a mammogram within the past 2 years? Yes - no Care Gap present      5. For patients aged 21-65: Has the patient had a pap smear?  NA - based on age or sex

## 2022-11-01 ENCOUNTER — OFFICE VISIT (OUTPATIENT)
Dept: ORTHOPEDIC SURGERY | Age: 72
End: 2022-11-01
Payer: MEDICARE

## 2022-11-01 VITALS — BODY MASS INDEX: 29.09 KG/M2 | RESPIRATION RATE: 14 BRPM | WEIGHT: 181 LBS | HEIGHT: 66 IN

## 2022-11-01 DIAGNOSIS — M17.0 PRIMARY LOCALIZED OSTEOARTHRITIS OF KNEES, BILATERAL: ICD-10-CM

## 2022-11-01 DIAGNOSIS — M22.2X2 PATELLOFEMORAL DISORDER OF BOTH KNEES: ICD-10-CM

## 2022-11-01 DIAGNOSIS — M70.52 PES ANSERINUS BURSITIS OF BOTH KNEES: Primary | ICD-10-CM

## 2022-11-01 DIAGNOSIS — M70.52 PES ANSERINUS BURSITIS OF LEFT KNEE: ICD-10-CM

## 2022-11-01 DIAGNOSIS — M22.2X1 PATELLOFEMORAL DISORDER OF BOTH KNEES: ICD-10-CM

## 2022-11-01 DIAGNOSIS — I89.0 LYMPHEDEMA OF LEFT LEG: ICD-10-CM

## 2022-11-01 DIAGNOSIS — M70.51 PES ANSERINUS BURSITIS OF BOTH KNEES: Primary | ICD-10-CM

## 2022-11-01 PROCEDURE — 20611 DRAIN/INJ JOINT/BURSA W/US: CPT | Performed by: FAMILY MEDICINE

## 2022-11-01 RX ORDER — DICLOFENAC SODIUM 50 MG/1
50 TABLET, DELAYED RELEASE ORAL 2 TIMES DAILY
Qty: 60 TABLET | Refills: 1 | Status: SHIPPED | OUTPATIENT
Start: 2022-11-01

## 2022-11-01 RX ORDER — METHYLPREDNISOLONE ACETATE 40 MG/ML
40 INJECTION, SUSPENSION INTRA-ARTICULAR; INTRALESIONAL; INTRAMUSCULAR; SOFT TISSUE ONCE
Status: COMPLETED | OUTPATIENT
Start: 2022-11-01 | End: 2022-11-01

## 2022-11-01 RX ADMIN — METHYLPREDNISOLONE ACETATE 40 MG: 40 INJECTION, SUSPENSION INTRA-ARTICULAR; INTRALESIONAL; INTRAMUSCULAR; SOFT TISSUE at 15:36

## 2022-11-01 NOTE — LETTER
Name:  All Felix   :  1950  MR#:  [id number]   [address] / PROCEDURE   1. I (we), _____Alana Moore__________ authorize Armando Olmstead DO, FAOASM                       (Patient Name)     (Provider / Micheal Yates)   and/or such assistants as may be selected by him/her, to perform the following operation/procedures   _______________inject steroid into left knee pes anserine_____________________  _________________________________________________________________________  Note: If unable to obtain consent prior to an emergent procedure, document the emergent reason in the medical record. This procedure has been explained to my (our) satisfaction and included in the explanation was:   A) the intended benefit, nature, and extent of the procedure to be performed;   B) the significant risks involved and the probability of success;   C) alternative procedures and methods of treatment;   D) the dangers and probable consequences of such alternatives (including no procedure or treatment); E) the expected consequences of the procedure on my future health;   F) whether other qualified individuals would be performing important surgical tasks and / or whether  would be present to advise or support the procedure. I (we) understand that there are other risks of infection and other serious complications in the pre-operative/procedural and postoperative/procedural stages of my (our) care. I (we) have asked all of the questions which I (we) thought were important in deciding whether or not to undergo treatment or diagnosis. These questions have been answered to my (our) satisfaction. I (we) understand that no assurance can be given that the procedure will be a success, and no guarantee or warranty of success has been given to me (us).    2. It has been explained to me (us) that during the course of the operation/procedure, unforeseen conditions may be revealed that necessitate extension of the original procedure(s) or different procedure(s) than those set forth in Paragraph 1. I (we) authorize and request that the above-named physician, his/her assistants or his/her designees, perform procedures as necessary and desirable if deemed to be in my (our) best interest.     3. I acknowledge that other health care personnel may be observing this procedure for the purpose of medical education or other specified purposes as may be necessary as requested and/or approved by my (our) physician. 4. I (we) consent to the disposal by the hospital Pathologist of the removed tissue, parts or organs in accordance with hospital policy. Page 1 of 2          Name:  Ursula Guillaume         :  1950         MR#:  [id number]      4. I do__x__ do not____ consent to the use of a local infiltration pain blocking agent that will be used by my provider/surgical provider to help alleviate pain during my procedure. 6. I do___ do not__X__ consent to an emergent blood transfusion in the case of a life-threatening situation that requires blood components to be administered. This consent is valid for 24 hours from the beginning of the procedure. 7. This patient does ___ or does not __X__currently have a DNR status/order. If DNR order is in place, obtain \"Addendum to the Surgical Consent for ALL Patients with a DNR Order\" to address chase-operative status for limited intervention or DNR suspension. 8. I have read and fully understand the above Consent for Operation/Procedure and that all blanks were completed before I signed the consent.    X____________________________________ _____Alana Moore_________   Date: 2022/_______am/pm   Signature of Patient or legal representative.    ________________________ ______Ariana Dooley, ATC____Traci Catarina Khan LPN___  Date: 4364/_______ am/pm   Witness to Signature Printed Name Date / Time    (If patient is unable to sign or is a minor, complete the following)     Patient is a minor, ____years of age, or unable to sign because:   _________________________________________________________________________  Luli Lobe If a phone consent is obtained, consent will be documented by using two health care professionals, each affirming that the consenting party has no questions and gives consent for the procedure discussed with the physician/provider.     _________________________________ _______________________________   Date: ______/_____am/pm   2nd witness to phone consent Printed name Date / Time   Informed consent:   I have provided the explanation described above in section 1 to the patient and/or legal representative. I have provided the patient and/or legal representative with an opportunity to ask any questions about the proposed operation/procedure. _                       _ __SILVINO Sheppard____ 11/1/2022/_______ am/pm   Provider / Proceduralist Printed Name Date / Time   This Provider / Proceduralist performing the surgery is ONLY for Office-based procedures in [place]   [x] Board certified or Board eligible by one of the [employer] [place], the [employer], the [employer] of Podiatric Medicine, the [employer] of Foot and Ankle Surgery, or other board as approved by the hospital for medical staff appointment.    Revised 8/2/2021 Page 2 of 2

## 2022-11-01 NOTE — PROGRESS NOTES
HISTORY OF PRESENT ILLNESS    All Felix 1950 is a 74y.o. year old female comes in today to be evaluated and treated for: left knee pain, chronic w. flare    Since last appt has noticed pain in knees worse the last few weeks. Pain level 8/10. Using diclofenac 50mg with benefit. Heat helps. Had great benefit inject pes anserine MAY2022 but this seems more superior. IMAGING: XR knees 5/19/2022  IMPRESSION  1. No acute fracture or dislocation. 2. Significant tricompartmental osteoarthritis as above. Past Surgical History:   Procedure Laterality Date    HX CERVICAL DISKECTOMY  6/7/2016    HX COLONOSCOPY  approximately 2013    HX HYSTERECTOMY  approximately 1985    HX LUMBAR DISKECTOMY  2016    HX ORTHOPAEDIC  2006    right knee surgery    CO PLASTIC SURGERY, NECK       Social History     Socioeconomic History    Marital status:    Tobacco Use    Smoking status: Never    Smokeless tobacco: Never   Vaping Use    Vaping Use: Never used   Substance and Sexual Activity    Alcohol use: No     Comment: rarely    Drug use: No    Sexual activity: Yes     Partners: Male     Birth control/protection: Surgical     Current Outpatient Medications   Medication Sig Dispense Refill    PARoxetine (PAXIL) 40 mg tablet Take 1 Tablet by mouth daily. 90 Tablet 1    amLODIPine (NORVASC) 10 mg tablet TAKE 1 TABLET EVERY DAY FOR BLOOD PRESSURE 90 Tablet 1    levothyroxine (SYNTHROID) 112 mcg tablet Take 1 Tablet by mouth Daily (before breakfast). 90 Tablet 1    simvastatin (ZOCOR) 40 mg tablet TAKE 1 TABLET EVERY NIGHT FOR CHOLESTEROL 90 Tablet 1    ergocalciferol (ERGOCALCIFEROL) 1,250 mcg (50,000 unit) capsule Take 1 Capsule by mouth every seven (7) days. Indications: vitamin D deficiency (high dose therapy) 12 Capsule 2    diclofenac EC (VOLTAREN) 50 mg EC tablet Take 1 Tablet by mouth two (2) times a day. 60 Tablet 1    ibuprofen (MOTRIN) 200 mg tablet Take 400 mg by mouth every six (6) hours as needed for Pain. Past Medical History:   Diagnosis Date    Acquired hypothyroidism 12/22/2015    Allergic rhinitis     Atrophic vaginitis     Back pain     Cystitis     Degenerative disc disease, cervical 7/7/2016    Epigastric pain     Headache(784.0)     migraine    Hypercholesterolemia     Hyperlipidemia LDL goal <130 8/8/2016    Hypertension     Lymphedema 04/01/2022    Mixed incontinence 7/7/2016    Multiple closed fractures of ribs of right side 12/24/2019    Neck pain     Numbness of arm 12/18/06    left    OAB (overactive bladder) 5/8/2018    LOREN (obstructive sleep apnea)     no cpap    Phlebitis of saphenous vein 4/27/07    Left    Primary hypertension 6/6/2016    Thyroid disease     hypothyroidism    Urinary incontinence      Family History   Problem Relation Age of Onset    Hypertension Mother     Heart Disease Father 62    Hypertension Father     Alzheimer's Disease Father     Cancer Other         Unsure    Other Other         cerebral aneurysm         ROS:  + swell, no numb    Objective:  Resp 14   Ht 5' 6\" (1.676 m)   Wt 181 lb (82.1 kg)   BMI 29.21 kg/m²   NEURO:  Sensation intact light touch B/L lower extremities. Reflexes +2/4 patellar and Achilles bilaterally. MS:  LE Strength +5/5 bilateral .   bilateral  Knee:  0-1+ Effusion . Lachman negative. Valgus negative. Varus negative. negative joint line tenderness medial, lateral.  Yue negative. Posterior drawer negative. Noble compression test negative. Patellar apprehension negative. Patellar facet mild tender to palpation medial, lateral some crepitance bilateral .  Pes anserine minimal TTP bilateral     Assessment/Plan:     ICD-10-CM ICD-9-CM    1. Pes anserinus bursitis of both knees  M70.51 726.61 REFERRAL TO PHYSICAL THERAPY    M70.52  ARTHROCENTESIS ASPIR&/INJ MAJOR JT/BURSA W/US      diclofenac EC (VOLTAREN) 50 mg EC tablet      2.  Patellofemoral disorder of both knees  M22.2X1 719.96 REFERRAL TO PHYSICAL THERAPY    M22.2X2  diclofenac EC (VOLTAREN) 50 mg EC tablet      3. Primary localized osteoarthritis of knees, bilateral  M17.0 715.16 REFERRAL TO PHYSICAL THERAPY      diclofenac EC (VOLTAREN) 50 mg EC tablet      4. Lymphedema of left leg  I89.0 457.1 REFERRAL TO PHYSICAL THERAPY          Patient (or guardian if minor) verbalizes understanding of evaluation and plan. Will continue voltaren oral and inject left pes anserine as above and plan follow-up 6 weeks.

## 2022-11-01 NOTE — LETTER
11/1/2022    Patient: Kyler Stevenson   YOB: 1950   Date of Visit: 11/1/2022     Biancarose Daogildardo, [address]  [address] [facility] [address] [facility] [id number] [place] [address]    Dear Biancarose Reynolds, [address],      [facility]. My notes for this consultation are attached. If you have questions, please do not hesitate to call me. I look forward to following your patient along with you.       Sincerely,    Serene Cisse, DO

## 2022-11-10 NOTE — PROCEDURES
PROCEDURE NOTE:  Time out: 228pm on 11/1/2022  * Patient was identified by name and date of birth   * Agreement on procedure being performed was verified  * Risks and Benefits explained to the patient  * Procedure site verified and marked as necessary  * Patient was positioned for comfort  * Consent was signed and verified. Risks/benefits including but not limited to bleeding, infection, and scarring discussed and Pt wishes to proceed with procedure. The area was prepped with betadine. Ethyl chloride spray was used. Under sterile technique with ultrasound guidance using [employer] uSmart 3200T with 4-15 MHz linear transducer. Indication for ultrasound guidance habitus. 1cc of 40mg/cc methylprednisolone acetate  and 1cc mepivacaine were injected into point of maximal tenderness of left knee pes anserine bursa using distal approach. Sterile gauze used to clean the area. Blood loss minimal.  Noticed improvement in pain Sx within 5 minutes (now rated 1/10). Tolerated procedure well. Discussed possible signs/Sx of infxn, and advised to seek care if concerned. Ultrasound images (if applicable) digitally attached to CPT order in patients chart.

## 2022-11-28 ENCOUNTER — HOSPITAL ENCOUNTER (OUTPATIENT)
Dept: PHYSICAL THERAPY | Age: 72
Discharge: HOME | End: 2022-11-28
Attending: FAMILY MEDICINE
Payer: MEDICARE

## 2022-11-28 DIAGNOSIS — M22.2X1 PATELLOFEMORAL DISORDER OF BOTH KNEES: ICD-10-CM

## 2022-11-28 DIAGNOSIS — I89.0 LYMPHEDEMA OF LEFT LEG: ICD-10-CM

## 2022-11-28 DIAGNOSIS — M17.0 PRIMARY LOCALIZED OSTEOARTHRITIS OF KNEES, BILATERAL: Primary | ICD-10-CM

## 2022-11-28 DIAGNOSIS — M70.52 PES ANSERINUS BURSITIS OF BOTH KNEES: ICD-10-CM

## 2022-11-28 DIAGNOSIS — M22.2X2 PATELLOFEMORAL DISORDER OF BOTH KNEES: ICD-10-CM

## 2022-11-28 DIAGNOSIS — M70.51 PES ANSERINUS BURSITIS OF BOTH KNEES: ICD-10-CM

## 2022-11-28 PROCEDURE — 97163 PT EVAL HIGH COMPLEX 45 MIN: CPT

## 2022-11-28 NOTE — PROGRESS NOTES
[facility]  [address] [facility] [address]  [place], [address]  [phone] fax    Plan of Care/ Statement of Necessity for Physical Therapy Services    Patient name: Vani March Start of Care: 2022   Referral source: Danial Ortega DO : 1950    Medical Diagnosis: Primary localized osteoarthritis of knees, bilateral [M17.0]  Pes anserinus bursitis of both knees [M70.51, M70.52]  Patellofemoral disorder of both knees [M22.2X1, M22.2X2]  Lymphedema of left leg [I89.0]  Payor: Hernán Salinas / Plan: [address] [place] [address] HMO / Product Type: Availendar Care Medicare /  Onset Date:2022    Treatment Diagnosis: Left knee pain, left LE lymphedema   Prior Hospitalization: see medical history Provider#: [id number]   Medications: Verified on Patient summary List    Comorbidities: LBP, depression, HTN, incontinence   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 Assessment/ key information: Patient is 74year old female with c/o left knee pain and lower extremity swelling. She states she experienced an episode of significant swelling in February of this year with no precipitating event and the knee pain has been chronic though worsened around that time. She has been managing the pain with injections and the swelling with compression stockings with minimal success though does state she has not tried the stockings since the edema has improved. Evaluation showing increased edema of the left LE; no skin changes, induration, or fibrosis observed. Edema also non-pitting. Musculoskeletal exam showing reduced left hip and knee AROM, impaired hamstring length, and decreased strength of the hip girdle. Patient with signs and symptoms consistent with left LE lymphedema and joint surface impairment. Patient was provided extensive education on Complete Decongestive Therapy for lymphedema management but currently choosing to manage with compression garments and pneumatic pump. PT to assist patient in garment fitting and ordering next visit as well as provide referral to DME provider for pneumatic pump. Otherwise patient is a good candidate for physical therapy to address left knee pain and associated deficits for improved functional capacity. Evaluation Complexity History HIGH Complexity :3+ comorbidities / personal factors will impact the outcome/ POC ; Examination HIGH Complexity : 4+ Standardized tests and measures addressing body structure, function, activity limitation and / or participation in recreation  ;Presentation MEDIUM Complexity : Evolving with changing characteristics  ; Clinical Decision Making MEDIUM Complexity : FOTO score of 26-74  Overall Complexity Rating: HIGH   Problem List: pain affecting function, decrease ROM, decrease strength, edema affecting function, impaired gait/ balance, decrease ADL/ functional abilitiies, decrease activity tolerance, decrease flexibility/ joint mobility, and decrease transfer abilities   Treatment Plan may include any combination of the following: Therapeutic exercise, Neuromuscular reeducation, Manual therapy, Therapeutic activity, Self care/home management, and Gait training  Patient / Family readiness to learn indicated by: asking questions and interest  Persons(s) to be included in education: patient (P)  Barriers to Learning/Limitations: None  Patient Goal (s): Reduce the pain and learn how to manage the swelling  Patient Self Reported Health Status: good  Rehabilitation Potential: fair    Short Term Goals: To be accomplished in 2 weeks:  1. Patient to be independent in HEP to maximize therapeutic effect of care plan. 2. Patient to be fitted for compression garments for independent long term management of lymphedema. Long Term Goals:  To be accomplished in 4 weeks:  Patient to improve left knee flexion to 105 degrees for greater ease of stair negotiation. Patient to improve bilateral hip abduction strength to 4/5 to improve functional mobility tolerance. Patient to improve left 90/90 position to 70 degrees (lacking 20) for improved gait mechanics and increased ambulatory tolerance. Patient to improve FOTO score to 59 to indicate progression in functional capacity. Frequency / Duration: Patient to be seen 2 times per week for 4 weeks. Patient/ Caregiver education and instruction: Diagnosis, prognosis, self care, activity modification, brace/ splint application, and exercises   [x]  Plan of care has been reviewed with PTA    Certification Period: 11/28/2022 - 12/26/2022  Audrey Maria, PT 11/28/2022 4:30 PM    ________________________________________________________________________    I certify that the above Therapy Services are being furnished while the patient is under my care. I agree with the treatment plan and certify that this therapy is necessary.     [de-identified] Signature:____________________  Date:____________Time: _________     Anika Harvey, DO  Please sign and return to [facility]  [address] [facility] [address]  [place], [address]  [phone] fax

## 2022-11-28 NOTE — PROGRESS NOTES
PT DAILY TREATMENT NOTE     Patient Name: Sandeep Esparza  Date:2022  : 1950  [x]  Patient  Verified  Payor: Javanjosie Orlando / Plan: [address] [place] [address] HMO / Product Type: Managed Care Medicare /    In time: 3:15 PM  Out time:4:14 PM  Total Treatment Time (min): 59  Visit #: 1 of 8    Medicare/BCBS Only   Total Timed Codes (min):  40 1:1 Treatment Time:  59       Treatment Area: Primary localized osteoarthritis of knees, bilateral [M17.0]  Pes anserinus bursitis of both knees [M70.51, M70.52]  Patellofemoral disorder of both knees [M22.2X1, M22.2X2]  Lymphedema of left leg [I89.0]    SUBJECTIVE  Pain Level (0-10 scale): 3  Any medication changes, allergies to medications, adverse drug reactions, diagnosis change, or new procedure performed?: [x] No    [] Yes (see summary sheet for update)  Subjective functional status/changes:   [] No changes reported  Agreeable to initial evaluation, see assessment    OBJECTIVE    19 min [x]Eval                  []Re-Eval     8 min Therapeutic Exercise:  HEP initiation and abbreviated performance   Rationale: increase ROM and increase strength to improve the patients ability to improve gait tolerance. 32 min Self Care/Home Management: Education regarding A&P of lymphatic system and associated pathology, regarding role of Complete Decongestive Therapy (CDT) in managing condition, phases of CDT including visual references/videos of compression bandaging, education on various garments, explanation/demonstration of  Manual Lymph Drainage (MLD), education on time and financial commitment of lymphedema management    Rationale:  Increase carryover of therapeutic interventions   to improve the patients ability to manage condition independently.            With   [] TE   [] TA   [] neuro   [x] other: Patient Education: [x] Review HEP    [] Progressed/Changed HEP based on:   [] positioning   [] body mechanics   [] transfers   [] heat/ice application    [x] other: Self Care     Other Objective/Functional Measures: See evaluation     Pain Level (0-10 scale) post treatment: 3    ASSESSMENT/Changes in Function: Patient is 74year old female with c/o left knee pain and lower extremity swelling. She states she experienced an episode of significant swelling in February of this year with no precipitating event and the knee pain has been chronic though worsened around that time. She has been managing the pain with injections and the swelling with compression stockings with minimal success though does state she has not tried the stockings since the edema has improved. Evaluation showing increased edema of the left LE; no skin changes, induration, or fibrosis observed. Edema also non-pitting. Musculoskeletal exam showing reduced left hip and knee AROM, impaired hamstring length, and decreased strength of the hip girdle. Patient with signs and symptoms consistent with left LE lymphedema and joint surface impairment. Patient was provided extensive education on Complete Decongestive Therapy for lymphedema management but currently choosing to manage with compression garments and pneumatic pump. PT to assist patient in garment fitting and ordering next visit as well as provide referral to DME provider for pneumatic pump. Otherwise patient is a good candidate for physical therapy to address left knee pain and associated deficits for improved functional capacity. [x]  See Plan of Care  []  See progress note/recertification  []  See Discharge Summary         Progress towards goals / Updated goals:  Short Term Goals: To be accomplished in 2 weeks:  1. Patient to be independent in HEP to maximize therapeutic effect of care plan. Evaluation: initiated and instructed  2. Patient to be fitted for compression garments for independent long term management of lymphedema. Evaluation: to be fitted next visit per patient choice and assessment of current garment fit     Long Term Goals:  To be accomplished in 4 weeks:  Patient to improve left knee flexion to 105 degrees for greater ease of stair negotiation. Evaluation: 98 degrees  Patient to improve bilateral hip abduction strength to 4/5 to improve functional mobility tolerance. Evaluation: 3/5  Patient to improve left 90/90 position to 70 degrees (lacking 20) for improved gait mechanics and increased ambulatory tolerance. Evaluation: 55 deg (lacking 35 deg)  Patient to improve FOTO score to 59 to indicate progression in functional capacity.   Evaluation: 61    PLAN  [x]  Upgrade activities as tolerated     []  Continue plan of care  []  Update interventions per flow sheet       []  Discharge due to:_  []  Other:_      Sara Butcher, PT 11/28/2022  4:43 PM    Future Appointments   Date Time Provider Andrew Gege   11/29/2022  4:45 PM Dickson Bennett, PT [facility]   12/6/2022  4:30 PM Cora Ni, PTA [facility]   12/13/2022 11:30 AM Khadar Nguyen, PT [facility]   12/13/2022  2:00 PM DO BOBBY Platt BS [facility]   12/15/2022 11:00 AM Elenare Ni, PTA [facility]   12/20/2022  1:00 PM Elenare Ni, PTA [facility]   12/22/2022 11:30 AM Khadar Nguyen, PT [facility]   12/27/2022  1:00 PM Khadar Gormans, PT [facility]   12/29/2022 11:00 AM Khadar Gormans, PT [facility]   3/8/2023  9:35 AM IOC LAB VISIT IO BS AMB   3/15/2023  1:15 PM Kim Lieberman DNP IO BS AMB

## 2022-11-29 ENCOUNTER — HOSPITAL ENCOUNTER (OUTPATIENT)
Dept: PHYSICAL THERAPY | Age: 72
Discharge: HOME | End: 2022-11-29
Attending: FAMILY MEDICINE
Payer: MEDICARE

## 2022-11-29 PROCEDURE — 97112 NEUROMUSCULAR REEDUCATION: CPT

## 2022-11-29 PROCEDURE — 97110 THERAPEUTIC EXERCISES: CPT

## 2022-11-29 NOTE — PROGRESS NOTES
PT DAILY TREATMENT NOTE     Patient Name: Roger Simon  Date:2022  : 1950  [x]  Patient  Verified  Payor: Luis Fenton / Plan: [address] [place] [address] HMO / Product Type: Managed Care Medicare /    In time: 4:45  Out time:5:23 PM  Total Treatment Time (min): 38  Visit #: 2 of 8    Medicare/BCBS Only   Total Timed Codes (min):  38 1:1 Treatment Time:  38       Treatment Area: Lymphedema of lower extremity [I89.0]  Right knee pain [M25.561]  Left knee pain [M25.562]    SUBJECTIVE  Pain Level (0-10 scale): 3  Any medication changes, allergies to medications, adverse drug reactions, diagnosis change, or new procedure performed?: [x] No    [] Yes (see summary sheet for update)  Subjective functional status/changes:   [] No changes reported  Reports she tried all the compression stockings she has at home and would like to try those before ordering any other garments. Otherwise no changes reported. OBJECTIVE    30 min Therapeutic Exercise:  [x] See flow sheet :   Rationale: increase ROM and increase strength to improve the patients ability to negotiate stairs and perform ADLs. 8 min Neuromuscular Re-education:  []  See flow sheet :   Rationale: increase ROM, increase strength, and improve coordination  to improve the patients ability to perform functional transfers. With   [] TE   [] TA   [] neuro   [] other: Patient Education: [x] Review HEP    [] Progressed/Changed HEP based on:   [] positioning   [] body mechanics   [] transfers   [] heat/ice application    [] other:      Other Objective/Functional Measures: Initiated HEP     Pain Level (0-10 scale) post treatment: 2    ASSESSMENT/Changes in Function: Patient foregoing ordering new compression garments at this time. PT has submitted referral for pneumatic pump for which patient has home trial scheduled. Patient otherwise participatory though reporting discomfort/fatigue throughout.  Would benefit from additional intervention to improve knee ROM and possible soft tissue work to distal adductors and hamstring insertion for further improvement in knee ROM. Patient will continue to benefit from skilled PT services to modify and progress therapeutic interventions, address functional mobility deficits, address ROM deficits, address strength deficits, analyze and address soft tissue restrictions, analyze and cue movement patterns, analyze and modify body mechanics/ergonomics, and assess and modify postural abnormalities to attain remaining goals. []  See Plan of Care  []  See progress note/recertification  []  See Discharge Summary         Progress towards goals / Updated goals:    Short Term Goals: To be accomplished in 2 weeks:  1. Patient to be independent in HEP to maximize therapeutic effect of care plan. Evaluation: initiated and instructed  2. Patient to be fitted for compression garments for independent long term management of lymphedema. Evaluation: to be fitted next visit per patient choice and assessment of current garment fit     Long Term Goals: To be accomplished in 4 weeks:  Patient to improve left knee flexion to 105 degrees for greater ease of stair negotiation. Evaluation: 98 degrees  Patient to improve bilateral hip abduction strength to 4/5 to improve functional mobility tolerance. Evaluation: 3/5  Patient to improve left 90/90 position to 70 degrees (lacking 20) for improved gait mechanics and increased ambulatory tolerance. Evaluation: 55 deg (lacking 35 deg)  Patient to improve FOTO score to 59 to indicate progression in functional capacity.   Evaluation: 61    PLAN  [x]  Upgrade activities as tolerated     []  Continue plan of care  []  Update interventions per flow sheet       []  Discharge due to:_  []  Other:_      Belen Arreaga, PT 11/29/2022  3:33 PM    Future Appointments   Date Time Provider Andrew De Guzman   11/29/2022  4:45 PM Sunitha Stark PT [facility]   12/6/2022  4:30 PM Emily Beach PTA [facility]   12/13/2022 11:30 AM Mata Monreal, PT [facility]   12/13/2022  2:00 PM DO BOBBY Amanda BS [facility]   12/15/2022 11:00 AM Salome Alvarezo, PTA [facility]   12/20/2022  1:00 PM Salome Montgomery, PTA [facility]   12/22/2022 11:30 AM Hitesh Tolbert, PT [facility]   12/27/2022  1:00 PM Hitesh Tolbert, PT [facility]   12/29/2022 11:00 AM Hitesh Tolbert, PT [facility]   3/8/2023  9:35 AM [facility] LAB VISIT IO BS AMB   3/15/2023  1:15 PM Mj Lieberman DNP [facility] AMB

## 2022-12-06 ENCOUNTER — TELEPHONE (OUTPATIENT)
Dept: ORTHOPEDIC SURGERY | Age: 72
End: 2022-12-06

## 2022-12-06 ENCOUNTER — APPOINTMENT (OUTPATIENT)
Dept: PHYSICAL THERAPY | Age: 72
End: 2022-12-06
Attending: FAMILY MEDICINE

## 2022-12-06 NOTE — TELEPHONE ENCOUNTER
Call received from [facility] with [employer] asking for  the patients office notes to be faxed, along with the order to [phone]

## 2022-12-07 ENCOUNTER — TELEPHONE (OUTPATIENT)
Dept: PHYSICAL THERAPY | Age: 72
End: 2022-12-07

## 2022-12-13 ENCOUNTER — APPOINTMENT (OUTPATIENT)
Dept: PHYSICAL THERAPY | Age: 72
End: 2022-12-13
Attending: FAMILY MEDICINE

## 2022-12-15 ENCOUNTER — APPOINTMENT (OUTPATIENT)
Dept: PHYSICAL THERAPY | Age: 72
End: 2022-12-15
Attending: FAMILY MEDICINE

## 2022-12-20 ENCOUNTER — APPOINTMENT (OUTPATIENT)
Dept: PHYSICAL THERAPY | Age: 72
End: 2022-12-20
Attending: FAMILY MEDICINE

## 2022-12-22 ENCOUNTER — APPOINTMENT (OUTPATIENT)
Dept: PHYSICAL THERAPY | Age: 72
End: 2022-12-22
Attending: FAMILY MEDICINE

## 2022-12-27 ENCOUNTER — APPOINTMENT (OUTPATIENT)
Dept: PHYSICAL THERAPY | Age: 72
End: 2022-12-27
Attending: FAMILY MEDICINE

## 2022-12-29 ENCOUNTER — APPOINTMENT (OUTPATIENT)
Dept: PHYSICAL THERAPY | Age: 72
End: 2022-12-29
Attending: FAMILY MEDICINE

## 2023-02-13 DIAGNOSIS — I83.892 VARICOSE VEINS OF LEFT LOWER EXTREMITY WITH EDEMA: Primary | ICD-10-CM

## 2023-03-08 DIAGNOSIS — E55.9 HYPOVITAMINOSIS D: ICD-10-CM

## 2023-03-08 DIAGNOSIS — I10 PRIMARY HYPERTENSION: Primary | ICD-10-CM

## 2023-03-08 DIAGNOSIS — E78.5 HYPERLIPIDEMIA LDL GOAL <130: ICD-10-CM

## 2023-03-08 DIAGNOSIS — E03.9 ACQUIRED HYPOTHYROIDISM: ICD-10-CM

## 2023-03-08 NOTE — PROGRESS NOTES
Diagnosis Orders   1. Primary hypertension  Comprehensive Metabolic Panel      2. Acquired hypothyroidism  TSH      3. Hyperlipidemia LDL goal <130  Lipid Panel      4.  Hypovitaminosis D  Vitamin D 25 Hydroxy

## 2023-03-09 LAB
25(OH)D3+25(OH)D2 SERPL-MCNC: 56.1 NG/ML (ref 30–100)
ALBUMIN SERPL-MCNC: 4.1 G/DL (ref 3.7–4.7)
ALBUMIN/GLOB SERPL: 1.5 {RATIO} (ref 1.2–2.2)
ALP SERPL-CCNC: 86 IU/L (ref 44–121)
ALT SERPL-CCNC: 20 IU/L (ref 0–32)
AST SERPL-CCNC: 16 IU/L (ref 0–40)
BILIRUB SERPL-MCNC: 0.4 MG/DL (ref 0–1.2)
BUN SERPL-MCNC: 13 MG/DL (ref 8–27)
BUN/CREAT SERPL: 17 (ref 12–28)
CALCIUM SERPL-MCNC: 9.1 MG/DL (ref 8.7–10.3)
CHLORIDE SERPL-SCNC: 104 MMOL/L (ref 96–106)
CHOLEST SERPL-MCNC: 254 MG/DL (ref 100–199)
CO2 SERPL-SCNC: 25 MMOL/L (ref 20–29)
CREAT SERPL-MCNC: 0.75 MG/DL (ref 0.57–1)
EGFRCR SERPLBLD CKD-EPI 2021: 83 ML/MIN/1.73
GLOBULIN SER CALC-MCNC: 2.8 G/DL (ref 1.5–4.5)
GLUCOSE SERPL-MCNC: 96 MG/DL (ref 70–99)
HDLC SERPL-MCNC: 51 MG/DL
LDLC SERPL CALC-MCNC: 180 MG/DL (ref 0–99)
POTASSIUM SERPL-SCNC: 4.4 MMOL/L (ref 3.5–5.2)
PROT SERPL-MCNC: 6.9 G/DL (ref 6–8.5)
SODIUM SERPL-SCNC: 142 MMOL/L (ref 134–144)
SPECIMEN STATUS REPORT: NORMAL
TRIGL SERPL-MCNC: 129 MG/DL (ref 0–149)
TSH SERPL DL<=0.005 MIU/L-ACNC: 0.51 UIU/ML (ref 0.45–4.5)
VLDLC SERPL CALC-MCNC: 23 MG/DL (ref 5–40)

## 2023-03-16 ENCOUNTER — OFFICE VISIT (OUTPATIENT)
Age: 73
End: 2023-03-16

## 2023-03-16 ENCOUNTER — HOSPITAL ENCOUNTER (OUTPATIENT)
Facility: HOSPITAL | Age: 73
Discharge: HOME | End: 2023-03-19

## 2023-03-16 VITALS — WEIGHT: 180 LBS | BODY MASS INDEX: 29.05 KG/M2

## 2023-03-16 DIAGNOSIS — M17.11 PRIMARY OSTEOARTHRITIS OF RIGHT KNEE: ICD-10-CM

## 2023-03-16 DIAGNOSIS — M70.51 PES ANSERINUS BURSITIS OF RIGHT KNEE: ICD-10-CM

## 2023-03-16 DIAGNOSIS — M22.2X1 PATELLOFEMORAL DISORDER, RIGHT: ICD-10-CM

## 2023-03-16 DIAGNOSIS — M70.51 PES ANSERINUS BURSITIS OF RIGHT KNEE: Primary | ICD-10-CM

## 2023-03-16 RX ORDER — METHYLPREDNISOLONE ACETATE 40 MG/ML
40 INJECTION, SUSPENSION INTRA-ARTICULAR; INTRALESIONAL; INTRAMUSCULAR; SOFT TISSUE ONCE
Status: COMPLETED | OUTPATIENT
Start: 2023-03-16 | End: 2023-03-16

## 2023-03-16 RX ADMIN — METHYLPREDNISOLONE ACETATE 40 MG: 40 INJECTION, SUSPENSION INTRA-ARTICULAR; INTRALESIONAL; INTRAMUSCULAR; SOFT TISSUE at 14:08

## 2023-03-16 NOTE — LETTER
Name:  Chandu Bishop   :  1950  MR#:  [id number]   [address] / PROCEDURE   1. I (we), ____Felipa Toledo____ authorize Lennox Looney DO, FAOASM                       (Patient Name)     (Provider / Proceduralist)   and/or such assistants as may be selected by him/her, to perform the following operation/procedures   ________inject steroid into right knee pes anserine_____________________  _____________________________________________________________________  _____________________________________________________________________  Note: If unable to obtain consent prior to an emergent procedure, document the emergent reason in the medical record. This procedure has been explained to my (our) satisfaction and included in the explanation was:   A) the intended benefit, nature, and extent of the procedure to be performed;   B) the significant risks involved and the probability of success;   C) alternative procedures and methods of treatment;   D) the dangers and probable consequences of such alternatives (including no procedure or treatment); E) the expected consequences of the procedure on my future health;   F) whether other qualified individuals would be performing important surgical tasks and / or whether  would be present to advise or support the procedure. I (we) understand that there are other risks of infection and other serious complications in the pre-operative/procedural and postoperative/procedural stages of my (our) care. I (we) have asked all of the questions which I (we) thought were important in deciding whether or not to undergo treatment or diagnosis. These questions have been answered to my (our) satisfaction. I (we) understand that no assurance can be given that the procedure will be a success, and no guarantee or warranty of success has been given to me (us).    2. It has been explained to me (us) that during the course of the operation/procedure, unforeseen conditions may be revealed that necessitate extension of the original procedure(s) or different procedure(s) than those set forth in Paragraph 1. I (we) authorize and request that the above-named physician, his/her assistants or his/her designees, perform procedures as necessary and desirable if deemed to be in my (our) best interest.     3. I acknowledge that other health care personnel may be observing this procedure for the purpose of medical education or other specified purposes as may be necessary as requested and/or approved by my (our) physician. 4. I (we) consent to the disposal by the hospital Pathologist of the removed tissue, parts or organs in accordance with hospital policy. Page 1 of 2        Name:  Kizzy Davis         :  1950         MR#:  [id number]      6. I do__X__ do not____ consent to the use of a local infiltration pain blocking agent that will be used by my provider/surgical provider to help alleviate pain during my procedure. 6. I do___ do not__X__ consent to an emergent blood transfusion in the case of a life-threatening situation that requires blood components to be administered. This consent is valid for 24 hours from the beginning of the procedure. 7. This patient does ___ or does not __X__currently have a DNR status/order. If DNR order is in place, obtain \"Addendum to the Surgical Consent for ALL Patients with a DNR Order\" to address dalila-operative status for limited intervention or DNR suspension. 8. I have read and fully understand the above Consent for Operation/Procedure and that all blanks were completed before I signed the consent.    X____________________________________ _____Felipa Toledo______   Date: 3/16/2023/_______am/pm   Signature of Patient or legal representative.    ________________________ ______Adia Quevedo, ATC____Michelle Naranjo LPN___  Date: 1886/_______ am/pm   Witness to Signature Printed Name Date / Time    (If patient is unable to sign or is a minor, complete the following)     Patient is a minor, ____years of age, or unable to sign because:   ______________________________________________________________________  Nakia Draft If a phone consent is obtained, consent will be documented by using two health care professionals, each affirming that the consenting party has no questions and gives consent for the procedure discussed with the physician/provider.   _________________________________ _______________________________   Date: ______/_____am/pm   2nd witness to phone consent Printed name Date / Time   Informed consent:   I have provided the explanation described above in section 1 to the patient and/or legal representative. I have provided the patient and/or legal representative with an opportunity to ask any questions about the proposed operation/procedure. _                       _ __MITCHEL Leung____ 3/16/2023/_______ am/pm   Provider / Proceduralist Printed Name Date / Time   This Provider / Proceduralist performing the surgery is ONLY for Office-based procedures in [place]   [x] Board certified or Board eligible by one of the [employer], the [employer] [facility] the [employer], the [employer] Pod[employer], the [employer] Foot and Ankle Surgery, or other board as approved by the hospital for medical staff appointment.    Revised 8/2/2021 Page 2 of 2

## 2023-03-16 NOTE — PROGRESS NOTES
HISTORY OF PRESENT ILLNESS    Jose April 1950 is a 74y.o. year old female comes in today to be evaluated and treated for: right knee pain chronic    Since last appt has noticed pain recurred last day or so. Pain level 9/10. Using rest, tylenol, heat with some benefit. + theater sign. IMAGING: XR right knee pending    XR right knee 5/25/2023  1. No acute fracture or dislocation. 2. Significant tricompartmental osteoarthritis. Past Surgical History:   Procedure Laterality Date    CERVICAL DISCECTOMY  6/7/2016    COLONOSCOPY  approximately 2013    HYSTERECTOMY (CERVIX STATUS UNKNOWN)  approximately 1985    LUMBAR DISCECTOMY  2016    ORTHOPEDIC SURGERY  2006    right knee surgery    PLASTIC SURGERY, NECK       Social History     Socioeconomic History    Marital status:      Spouse name: None    Number of children: None    Years of education: None    Highest education level: None   Tobacco Use    Smoking status: Never    Smokeless tobacco: Never   Substance and Sexual Activity    Alcohol use: No    Drug use: No     Current Outpatient Medications   Medication Sig Dispense Refill    amLODIPine (NORVASC) 10 MG tablet TAKE 1 TABLET EVERY DAY FOR BLOOD PRESSURE      diclofenac (VOLTAREN) 50 MG EC tablet Take 50 mg by mouth 2 times daily      ergocalciferol (ERGOCALCIFEROL) 1.25 MG (74044 UT) capsule Take 50,000 Units by mouth every 7 days      ibuprofen (ADVIL;MOTRIN) 200 MG tablet Take 400 mg by mouth every 6 hours as needed      levothyroxine (SYNTHROID) 112 MCG tablet Take 112 mcg by mouth every morning (before breakfast)      PARoxetine (PAXIL) 40 MG tablet Take 40 mg by mouth daily      simvastatin (ZOCOR) 40 MG tablet TAKE 1 TABLET EVERY NIGHT FOR CHOLESTEROL       No current facility-administered medications for this visit.      Past Medical History:   Diagnosis Date    Acquired hypothyroidism 12/22/2015    Allergic rhinitis     Atrophic vaginitis     Back pain     Cystitis     Degenerative disc disease, cervical 7/7/2016    Epigastric pain     Headache(784.0)     migraine    Hypercholesterolemia     Hyperlipidemia LDL goal <130 8/8/2016    Hypertension     Lymphedema 04/01/2022    Mixed incontinence 7/7/2016    Multiple closed fractures of ribs of right side 12/24/2019    Neck pain     Numbness of arm 12/18/06    left    OAB (overactive bladder) 5/8/2018    NORMA (obstructive sleep apnea)     no cpap    Phlebitis of saphenous vein 4/27/07    Left    Primary hypertension 6/6/2016    Thyroid disease     hypothyroidism    Urinary incontinence      Family History   Problem Relation Age of Onset    Hypertension Father     Heart Disease Father 62    Hypertension Mother     Cancer Other         Unsure    Alzheimer's Disease Father     Other Other         cerebral aneurysm     ROS:  + swell, no numb    Objective: Wt 180 lb (81.6 kg)   BMI 29.05 kg/m²   NEURO:  Sensation intact light touch B/L lower extremities. Reflexes +2/4 patellar and Achilles bilaterally. MS:  LE Strength +5/5 bilateral .   right Knee:  2+ Effusion . Lachman negative. Valgus negative. Varus negative. negative joint line tenderness medial, lateral.  Tanya negative. Posterior drawer negative. Noble compression test negative. Patellar apprehension negative. Patellar facet mild tender to palpation medial, lateral some crepitance bilateral .  Pes anserine ++ TTP right    Assessment/Plan:    Diagnosis Orders   1. Pes anserinus bursitis of right knee  XR KNEE RIGHT (1-2 VIEWS)    diclofenac sodium (VOLTAREN) 1 % GEL    ARTHROCENTESIS ASPIR&/INJ MAJOR JT/BURSA W/US    methylPREDNISolone acetate (DEPO-MEDROL) injection 40 mg      2. Patellofemoral disorder, right  XR KNEE RIGHT (1-2 VIEWS)      3. Primary osteoarthritis of right knee          Patient (or guardian if minor) verbalizes understanding of evaluation and plan. Will inject pes anserine and start HEP as above and plan follow-up 4 weeks.  Will use voltaren 50mg as needed as well as voltaren gel.

## 2023-04-07 PROBLEM — F32.5 MAJOR DEPRESSIVE DISORDER, SINGLE EPISODE, IN FULL REMISSION (HCC): Status: ACTIVE | Noted: 2023-04-07

## 2023-04-12 PROBLEM — Z12.11 COLON CANCER SCREENING: Status: ACTIVE | Noted: 2023-04-12

## 2023-04-12 PROBLEM — M17.11 PRIMARY OSTEOARTHRITIS OF RIGHT KNEE: Status: ACTIVE | Noted: 2023-04-12

## 2023-04-12 PROBLEM — M70.51 PES ANSERINUS BURSITIS OF RIGHT KNEE: Status: ACTIVE | Noted: 2023-04-12

## 2023-04-12 PROBLEM — M85.80 OSTEOPENIA AFTER MENOPAUSE: Status: ACTIVE | Noted: 2023-04-12

## 2023-04-12 PROBLEM — Z00.00 MEDICARE ANNUAL WELLNESS VISIT, SUBSEQUENT: Status: ACTIVE | Noted: 2023-04-12

## 2023-04-12 PROBLEM — Z71.89 ACP (ADVANCE CARE PLANNING): Status: ACTIVE | Noted: 2023-04-12

## 2023-04-12 PROBLEM — Z78.0 OSTEOPENIA AFTER MENOPAUSE: Status: ACTIVE | Noted: 2023-04-12

## 2023-04-12 PROBLEM — R22.1 MASS OF RIGHT SIDE OF NECK: Status: ACTIVE | Noted: 2023-04-12

## 2023-04-19 RX ORDER — ERGOCALCIFEROL 1.25 MG/1
50000 CAPSULE ORAL WEEKLY
Qty: 12 CAPSULE | Refills: 0 | Status: SHIPPED | OUTPATIENT
Start: 2023-04-19

## 2023-04-27 ENCOUNTER — HOSPITAL ENCOUNTER (OUTPATIENT)
Facility: HOSPITAL | Age: 73
Discharge: HOME | End: 2023-04-27
Payer: MEDICARE

## 2023-04-27 DIAGNOSIS — R22.1 MASS OF RIGHT SIDE OF NECK: ICD-10-CM

## 2023-04-27 PROCEDURE — 76536 US EXAM OF HEAD AND NECK: CPT

## 2023-05-05 ENCOUNTER — PATIENT MESSAGE (OUTPATIENT)
Age: 73
End: 2023-05-05

## 2023-05-05 DIAGNOSIS — R22.1 NODULE OF NECK: Primary | ICD-10-CM

## 2023-05-08 PROBLEM — R22.1 NODULE OF NECK: Status: ACTIVE | Noted: 2023-05-08

## 2023-05-08 NOTE — TELEPHONE ENCOUNTER
From: Brenna Merritt  To: Romain Harris  Sent: 5/5/2023 12:25 PM EDT  Subject: Question regarding US Head Neck Soft Tissue Thyroid    Wondering what you thought of this result?

## 2023-05-12 PROBLEM — Z00.00 MEDICARE ANNUAL WELLNESS VISIT, SUBSEQUENT: Status: RESOLVED | Noted: 2023-04-12 | Resolved: 2023-05-12

## 2023-05-12 PROBLEM — Z12.11 COLON CANCER SCREENING: Status: RESOLVED | Noted: 2023-04-12 | Resolved: 2023-05-12

## 2023-05-19 ENCOUNTER — HOSPITAL ENCOUNTER (OUTPATIENT)
Facility: HOSPITAL | Age: 73
End: 2023-05-19
Payer: MEDICARE

## 2023-05-19 DIAGNOSIS — R22.1 NODULE OF NECK: ICD-10-CM

## 2023-05-19 LAB — CREAT UR-MCNC: 0.8 MG/DL (ref 0.6–1.3)

## 2023-05-19 PROCEDURE — 6360000004 HC RX CONTRAST MEDICATION: Performed by: NURSE PRACTITIONER

## 2023-05-19 PROCEDURE — 82565 ASSAY OF CREATININE: CPT

## 2023-05-19 PROCEDURE — 70491 CT SOFT TISSUE NECK W/DYE: CPT

## 2023-05-19 RX ADMIN — IOPAMIDOL 100 ML: 612 INJECTION, SOLUTION INTRAVENOUS at 11:48

## 2023-05-25 ENCOUNTER — OFFICE VISIT (OUTPATIENT)
Age: 73
End: 2023-05-25
Payer: MEDICARE

## 2023-05-25 VITALS — WEIGHT: 182 LBS | RESPIRATION RATE: 14 BRPM | BODY MASS INDEX: 29.25 KG/M2 | HEIGHT: 66 IN

## 2023-05-25 DIAGNOSIS — M76.61 ACHILLES TENDINITIS, RIGHT LEG: Primary | ICD-10-CM

## 2023-05-25 DIAGNOSIS — M70.51 PES ANSERINUS BURSITIS OF RIGHT KNEE: ICD-10-CM

## 2023-05-25 DIAGNOSIS — M17.0 BILATERAL PRIMARY OSTEOARTHRITIS OF KNEE: ICD-10-CM

## 2023-05-25 DIAGNOSIS — M22.2X1 PATELLOFEMORAL DISORDER, RIGHT: ICD-10-CM

## 2023-05-25 PROCEDURE — G8427 DOCREV CUR MEDS BY ELIG CLIN: HCPCS | Performed by: FAMILY MEDICINE

## 2023-05-25 PROCEDURE — 1090F PRES/ABSN URINE INCON ASSESS: CPT | Performed by: FAMILY MEDICINE

## 2023-05-25 PROCEDURE — 1036F TOBACCO NON-USER: CPT | Performed by: FAMILY MEDICINE

## 2023-05-25 PROCEDURE — 3017F COLORECTAL CA SCREEN DOC REV: CPT | Performed by: FAMILY MEDICINE

## 2023-05-25 PROCEDURE — G8399 PT W/DXA RESULTS DOCUMENT: HCPCS | Performed by: FAMILY MEDICINE

## 2023-05-25 PROCEDURE — 1123F ACP DISCUSS/DSCN MKR DOCD: CPT | Performed by: FAMILY MEDICINE

## 2023-05-25 PROCEDURE — G8417 CALC BMI ABV UP PARAM F/U: HCPCS | Performed by: FAMILY MEDICINE

## 2023-05-25 PROCEDURE — 99214 OFFICE O/P EST MOD 30 MIN: CPT | Performed by: FAMILY MEDICINE

## 2023-05-25 NOTE — PROGRESS NOTES
HISTORY OF PRESENT ILLNESS    Riaz Martinez 1950 is a 74y.o. year old female comes in today to be evaluated and treated for: right knee pain, right heel    Since last appt has noticed pain improved at pes anserine after injected but lymphedema significant legs off/on. Pain level 3/10. Using voltaren gel with benefit. Also voltaren 50mg helps. Does have medial  brace for left knee. Also pain and lump inner right heel. IMAGING: XR right knee 3/16/2023  IMPRESSION:  Severe osteoarthritis of the right knee. No acute fracture. XR right knee 5/24/2022  1. No acute fracture or dislocation. 2. Significant tricompartmental osteoarthritis. XR left knee 5/24/2023  IMPRESSION:  1. No acute fracture or dislocation. 2. Significant tricompartmental osteoarthritis as above.     Past Surgical History:   Procedure Laterality Date    CERVICAL DISCECTOMY  6/7/2016    COLONOSCOPY  approximately 2013    HYSTERECTOMY (CERVIX STATUS UNKNOWN)  approximately [address] DISCECTOMY  2016    ORTHOPEDIC SURGERY  2006    right knee surgery    PLASTIC SURGERY, NECK       Social History     Socioeconomic History    Marital status:      Spouse name: None    Number of children: None    Years of education: None    Highest education level: None   Tobacco Use    Smoking status: Never    Smokeless tobacco: Never   Substance and Sexual Activity    Alcohol use: No    Drug use: No    Sexual activity: Not Currently     Partners: Male     Birth control/protection: Surgical     Social Determinants of Health     Financial Resource Strain: Unknown    Difficulty of Paying Living Expenses: Patient refused   Food Insecurity: Unknown    Worried About Running Out of Food in the Last Year: Patient refused    Ran Out of Food in the Last Year: Patient refused   Transportation Needs: Unknown    Lack of Transportation (Non-Medical): Patient refused   Physical Activity: Inactive    Days of Exercise per Week: 0 days    Minutes of

## 2023-06-02 ENCOUNTER — TELEPHONE (OUTPATIENT)
Age: 73
End: 2023-06-02

## 2023-06-02 PROBLEM — K11.8 PAROTID NODULE: Status: ACTIVE | Noted: 2023-06-02

## 2023-07-11 ENCOUNTER — TELEPHONE (OUTPATIENT)
Facility: HOSPITAL | Age: 73
End: 2023-07-11

## 2023-07-31 ENCOUNTER — HOSPITAL ENCOUNTER (OUTPATIENT)
Facility: HOSPITAL | Age: 73
Discharge: HOME | End: 2023-07-31
Attending: RADIOLOGY | Admitting: RADIOLOGY
Payer: MEDICARE

## 2023-07-31 VITALS
BODY MASS INDEX: 29.67 KG/M2 | SYSTOLIC BLOOD PRESSURE: 138 MMHG | TEMPERATURE: 98.2 F | OXYGEN SATURATION: 97 % | WEIGHT: 183.8 LBS | HEART RATE: 59 BPM | DIASTOLIC BLOOD PRESSURE: 67 MMHG | RESPIRATION RATE: 17 BRPM

## 2023-07-31 DIAGNOSIS — K11.8 PAROTID NODULE: ICD-10-CM

## 2023-07-31 LAB
ALBUMIN SERPL-MCNC: 3.5 G/DL (ref 3.4–5)
ALBUMIN/GLOB SERPL: 1.1 (ref 0.8–1.7)
ALP SERPL-CCNC: 78 U/L (ref 45–117)
ALT SERPL-CCNC: 18 U/L (ref 13–56)
ANION GAP SERPL CALC-SCNC: 3 MMOL/L (ref 3–18)
APTT PPP: 27.3 SEC (ref 23–36.4)
AST SERPL-CCNC: 14 U/L (ref 10–38)
BASOPHILS # BLD: 0.1 K/UL (ref 0–0.1)
BASOPHILS NFR BLD: 1 % (ref 0–2)
BILIRUB SERPL-MCNC: 0.5 MG/DL (ref 0.2–1)
BUN SERPL-MCNC: 13 MG/DL (ref 7–18)
BUN/CREAT SERPL: 20 (ref 12–20)
CALCIUM SERPL-MCNC: 8.9 MG/DL (ref 8.5–10.1)
CHLORIDE SERPL-SCNC: 109 MMOL/L (ref 100–111)
CO2 SERPL-SCNC: 29 MMOL/L (ref 21–32)
CREAT SERPL-MCNC: 0.64 MG/DL (ref 0.6–1.3)
DIFFERENTIAL METHOD BLD: ABNORMAL
EOSINOPHIL # BLD: 0.3 K/UL (ref 0–0.4)
EOSINOPHIL NFR BLD: 3 % (ref 0–5)
ERYTHROCYTE [DISTWIDTH] IN BLOOD BY AUTOMATED COUNT: 12.7 % (ref 11.6–14.5)
GLOBULIN SER CALC-MCNC: 3.1 G/DL (ref 2–4)
GLUCOSE SERPL-MCNC: 95 MG/DL (ref 74–99)
HCT VFR BLD AUTO: 37.9 % (ref 35–45)
HGB BLD-MCNC: 12.1 G/DL (ref 12–16)
IMM GRANULOCYTES # BLD AUTO: 0 K/UL (ref 0–0.04)
IMM GRANULOCYTES NFR BLD AUTO: 0 % (ref 0–0.5)
INR PPP: 1 (ref 0.8–1.2)
LYMPHOCYTES # BLD: 1.9 K/UL (ref 0.9–3.6)
LYMPHOCYTES NFR BLD: 21 % (ref 21–52)
MCH RBC QN AUTO: 28.7 PG (ref 24–34)
MCHC RBC AUTO-ENTMCNC: 31.9 G/DL (ref 31–37)
MCV RBC AUTO: 89.8 FL (ref 78–100)
MONOCYTES # BLD: 1.2 K/UL (ref 0.05–1.2)
MONOCYTES NFR BLD: 13 % (ref 3–10)
NEUTS SEG # BLD: 5.5 K/UL (ref 1.8–8)
NEUTS SEG NFR BLD: 62 % (ref 40–73)
NRBC # BLD: 0 K/UL (ref 0–0.01)
NRBC BLD-RTO: 0 PER 100 WBC
PLATELET # BLD AUTO: 335 K/UL (ref 135–420)
PMV BLD AUTO: 9.7 FL (ref 9.2–11.8)
POTASSIUM SERPL-SCNC: 4 MMOL/L (ref 3.5–5.5)
PROT SERPL-MCNC: 6.6 G/DL (ref 6.4–8.2)
PROTHROMBIN TIME: 13.3 SEC (ref 11.5–15.2)
RBC # BLD AUTO: 4.22 M/UL (ref 4.2–5.3)
SODIUM SERPL-SCNC: 141 MMOL/L (ref 136–145)
WBC # BLD AUTO: 8.9 K/UL (ref 4.6–13.2)

## 2023-07-31 PROCEDURE — 88177 CYTP FNA EVAL EA ADDL: CPT

## 2023-07-31 PROCEDURE — 85610 PROTHROMBIN TIME: CPT

## 2023-07-31 PROCEDURE — 88172 CYTP DX EVAL FNA 1ST EA SITE: CPT

## 2023-07-31 PROCEDURE — 2580000003 HC RX 258: Performed by: RADIOLOGY

## 2023-07-31 PROCEDURE — 7100000011 HC PHASE II RECOVERY - ADDTL 15 MIN

## 2023-07-31 PROCEDURE — 7100000010 HC PHASE II RECOVERY - FIRST 15 MIN

## 2023-07-31 PROCEDURE — 80053 COMPREHEN METABOLIC PANEL: CPT

## 2023-07-31 PROCEDURE — 85025 COMPLETE CBC W/AUTO DIFF WBC: CPT

## 2023-07-31 PROCEDURE — 85730 THROMBOPLASTIN TIME PARTIAL: CPT

## 2023-07-31 PROCEDURE — 88173 CYTOPATH EVAL FNA REPORT: CPT

## 2023-07-31 RX ORDER — ACETAMINOPHEN 325 MG/1
650 TABLET ORAL EVERY 4 HOURS PRN
Status: DISCONTINUED | OUTPATIENT
Start: 2023-07-31 | End: 2023-07-31 | Stop reason: HOSPADM

## 2023-07-31 RX ORDER — SODIUM CHLORIDE 9 MG/ML
INJECTION, SOLUTION INTRAVENOUS PRN
Status: DISCONTINUED | OUTPATIENT
Start: 2023-07-31 | End: 2023-07-31 | Stop reason: HOSPADM

## 2023-07-31 RX ADMIN — SODIUM CHLORIDE: 9 INJECTION, SOLUTION INTRAVENOUS at 10:38

## 2023-07-31 ASSESSMENT — PAIN - FUNCTIONAL ASSESSMENT: PAIN_FUNCTIONAL_ASSESSMENT: 0-10

## 2023-07-31 NOTE — PROCEDURES
RADIOLOGY POST PROCEDURE NOTE     July 31, 2023       12:08 PM     Preoperative Diagnosis:   Right parotid gland nodule    Postoperative Diagnosis:  Same. :  JERRY Rosenthal under direct supervision    Assistant:  Dr. Nathan Castaneda    Type of Anesthesia: 1% plain lidocaine    Procedure/Description:  Image-guided right parotid gland nodule fine needle FNA    Findings:   Successful FNA of right thyroid nodule. 3 passes. Estimated blood Loss:  Minimal    Specimen Removed:  Yes    Blood transfusions:  None. Implants:  None.     Complications: None    Condition: Stable    Blood thinning medications: OK to resume as clinically indicated    Discharge Plan:  discharge home     JERRY Rosenthal

## 2023-07-31 NOTE — H&P
OUTPATIENT HISTORY AND PHYSICAL    Today 7/31/2023     Indication/Symptoms:   Jani Peterson is a 74 y.o. female with a right parotid nodule here for a right parotid nodule FNA. Current Meds:    Prior to Admission medications    Medication Sig Start Date End Date Taking?  Authorizing Provider   diclofenac (VOLTAREN) 50 MG EC tablet Take 1 tablet by mouth 2 times daily 5/25/23   Gregory Kidney, DO   vitamin D (ERGOCALCIFEROL) 1.25 MG (09719 UT) CAPS capsule Take 1 capsule by mouth once a week Once 3 month therapy completed then start OTC Vit D 2000 iu daily 4/19/23   Renato Reyes, PHIL   simvastatin (ZOCOR) 40 MG tablet Take 1 tablet by mouth nightly 4/7/23   Faustine Backer, DNP   PARoxetine (PAXIL) 40 MG tablet Take 1 tablet by mouth daily 4/7/23   Faustine Backer, DNP   levothyroxine (SYNTHROID) 112 MCG tablet Take 1 tablet by mouth every morning (before breakfast) 4/7/23   Faustine Backer, DNP   amLODIPine (NORVASC) 10 MG tablet Take 1 tablet by mouth daily 4/7/23   Faustine Backer, DNP   diclofenac sodium (VOLTAREN) 1 % GEL Apply 2 g topically 4 times daily Right knee 3/16/23   Gregory Kidney, DO   ibuprofen (ADVIL;MOTRIN) 200 MG tablet Take 2 tablets by mouth every 6 hours as needed    Ar Automatic Reconciliation       Allergies:    No Known Allergies    Comorbid Conditions:    Past Medical History:   Diagnosis Date    Acquired hypothyroidism 12/22/2015    Allergic rhinitis     Atrophic vaginitis     Back pain     Cystitis     Degenerative disc disease, cervical 7/7/2016    Epigastric pain     Headache(784.0)     migraine    Hypercholesterolemia     Hyperlipidemia LDL goal <130 8/8/2016    Hypertension     Lymphedema 04/01/2022    Major depressive disorder, single episode, in full remission ([address]) 4/7/2023    Mixed hyperlipidemia 8/8/2016    Mixed incontinence 7/7/2016    Multiple closed fractures of ribs of right side 12/24/2019    Neck pain     Numbness of arm 12/18/06    left    OAB

## 2023-07-31 NOTE — DISCHARGE INSTRUCTIONS
Avoid sticky, hard (such as ice cubes, nuts, popcorn, chips), brittle, spicy, highly seasoned, or acidic foods in your diet. Foods such as soups, pasta, scrambled eggs, mashed potatoes, macaroni & cheese etc. are best. Be sure to maintain adequate nutrition and drink plenty of fluids. DISCHARGE SUMMARY from Nurse    PATIENT INSTRUCTIONS:    After general anesthesia or intravenous sedation, for 24 hours or while taking prescription Narcotics:  Limit your activities  Do not drive and operate hazardous machinery  Do not make important personal or business decisions  Do  not drink alcoholic beverages  If you have not urinated within 8 hours after discharge, please contact your surgeon on call. Report the following to your surgeon:  Excessive pain, swelling, redness or odor of or around the surgical area  Temperature over 100.5  Nausea and vomiting lasting longer than 4 hours or if unable to take medications  Any signs of decreased circulation or nerve impairment to extremity: change in color, persistent  numbness, tingling, coldness or increase pain  Any questions    What to do at Home:  Recommended activity: activity as tolerated,     If you experience any of the following symptoms YELLOWISH, GREENISH OR FOUL SMELLING DRAINAGE, please follow up with [address] [place], [place] [address]. *  Please give a list of your current medications to your Primary Care Provider. *  Please update this list whenever your medications are discontinued, doses are      changed, or new medications (including over-the-counter products) are added. *  Please carry medication information at all times in case of emergency situations. These are general instructions for a healthy lifestyle:    No smoking/ No tobacco products/ Avoid exposure to second hand smoke  Surgeon General's Warning:  Quitting smoking now greatly reduces serious risk to your health.     Obesity, smoking, and sedentary lifestyle greatly increases your risk for

## 2023-08-04 ENCOUNTER — CLINICAL DOCUMENTATION (OUTPATIENT)
Age: 73
End: 2023-08-04

## 2023-08-04 ENCOUNTER — TELEPHONE (OUTPATIENT)
Age: 73
End: 2023-08-04

## 2023-08-04 DIAGNOSIS — D11.0 PAROTID PLEOMORPHIC ADENOMA: Primary | ICD-10-CM

## 2023-08-04 NOTE — TELEPHONE ENCOUNTER
----- Message from Rainer Roman sent at 8/4/2023  9:16 AM EDT -----  Regarding: Tumor on neck  Contact: [phone]  Wondering what the thoughts were on this latest biopsy on the lump in my neck? Also one of my blood results were outside the normal bar.

## 2023-08-04 NOTE — PROGRESS NOTES
10:52am Left vm message for pt to return call. Reviewed FNA results of Rt parotid gland. Reveals PLEOMORPHIC ADENOMA, benign findings. Although these findings are benign, later they could become cancerous. I would like for her to see Dr Otilia Amador, ENT, for further evaluation. Referral placed. Diagnosis Orders   1.  Parotid pleomorphic adenoma  External Referral To ENT

## 2023-09-05 ENCOUNTER — PATIENT MESSAGE (OUTPATIENT)
Age: 73
End: 2023-09-05

## 2023-09-07 DIAGNOSIS — F32.5 MAJOR DEPRESSIVE DISORDER, SINGLE EPISODE, IN FULL REMISSION (HCC): ICD-10-CM

## 2023-09-07 RX ORDER — PAROXETINE HYDROCHLORIDE 40 MG/1
40 TABLET, FILM COATED ORAL DAILY
Qty: 90 TABLET | Refills: 0 | Status: SHIPPED | OUTPATIENT
Start: 2023-09-07 | End: 2023-09-07 | Stop reason: SDUPTHER

## 2023-09-07 RX ORDER — PAROXETINE HYDROCHLORIDE 40 MG/1
TABLET, FILM COATED ORAL
Qty: 90 TABLET | Refills: 0 | Status: SHIPPED | OUTPATIENT
Start: 2023-09-07 | End: 2023-10-05 | Stop reason: SDUPTHER

## 2023-09-07 NOTE — TELEPHONE ENCOUNTER
Your thyroid number in march was normal. Your LDL was elevated. Not sure this would cause yo not to feel well. Pls call the office 21 [phone] option number 3 and schedule an appt. I had an opening on 9/12/23 at 1245pm. Rosa Sousa can see if that is still available. You can schedule either virtual or face to face.  Thank you

## 2023-09-07 NOTE — TELEPHONE ENCOUNTER
From: Ciera Gore  To: El Reyes  Sent: 9/5/2023 8:56 AM EDT  Subject: Thyroid issue    I've been feeling really bad and when I looked at my tests from March at hospital, my TSH was . 505 along with my Cholesterol was 245! Any suggestions, my appointment with you is not until October.

## 2023-09-08 NOTE — PROGRESS NOTES
Requested Prescriptions     Signed Prescriptions Disp Refills    PARoxetine (PAXIL) 40 MG tablet 90 tablet 0     Sig: Take 1 tablet by mouth daily

## 2023-09-11 DIAGNOSIS — E55.9 VITAMIN D DEFICIENCY, UNSPECIFIED: Primary | ICD-10-CM

## 2023-09-11 RX ORDER — MULTIVIT-MIN/IRON/FOLIC ACID/K 18-600-40
1 CAPSULE ORAL DAILY
Qty: 30 CAPSULE | COMMUNITY
Start: 2023-09-11

## 2023-09-11 RX ORDER — ERGOCALCIFEROL 1.25 MG/1
CAPSULE ORAL
Qty: 12 CAPSULE | Refills: 0 | OUTPATIENT
Start: 2023-09-11

## 2023-09-26 DIAGNOSIS — M76.61 ACHILLES TENDINITIS, RIGHT LEG: ICD-10-CM

## 2023-09-26 DIAGNOSIS — M70.51 PES ANSERINUS BURSITIS OF RIGHT KNEE: ICD-10-CM

## 2023-09-26 DIAGNOSIS — M22.2X1 PATELLOFEMORAL DISORDER, RIGHT: ICD-10-CM

## 2023-09-26 DIAGNOSIS — M17.0 BILATERAL PRIMARY OSTEOARTHRITIS OF KNEE: ICD-10-CM

## 2023-09-27 ENCOUNTER — HOSPITAL ENCOUNTER (OUTPATIENT)
Facility: HOSPITAL | Age: 73
Setting detail: SPECIMEN
Discharge: HOME | End: 2023-09-30
Payer: MEDICARE

## 2023-09-27 ENCOUNTER — NURSE ONLY (OUTPATIENT)
Age: 73
End: 2023-09-27

## 2023-09-27 DIAGNOSIS — E78.2 MIXED HYPERLIPIDEMIA: ICD-10-CM

## 2023-09-27 LAB
CHOLEST SERPL-MCNC: 195 MG/DL
HDLC SERPL-MCNC: 50 MG/DL (ref 40–60)
HDLC SERPL: 3.9 (ref 0–5)
LDLC SERPL CALC-MCNC: 120.8 MG/DL (ref 0–100)
LIPID PANEL: ABNORMAL
TRIGL SERPL-MCNC: 121 MG/DL
VLDLC SERPL CALC-MCNC: 24.2 MG/DL

## 2023-09-27 PROCEDURE — 36415 COLL VENOUS BLD VENIPUNCTURE: CPT

## 2023-09-27 PROCEDURE — 80061 LIPID PANEL: CPT

## 2023-10-05 ENCOUNTER — OFFICE VISIT (OUTPATIENT)
Age: 73
End: 2023-10-05
Payer: MEDICARE

## 2023-10-05 VITALS
OXYGEN SATURATION: 98 % | TEMPERATURE: 98.3 F | SYSTOLIC BLOOD PRESSURE: 132 MMHG | RESPIRATION RATE: 16 BRPM | HEART RATE: 70 BPM | BODY MASS INDEX: 29.62 KG/M2 | WEIGHT: 184.31 LBS | HEIGHT: 66 IN | DIASTOLIC BLOOD PRESSURE: 67 MMHG

## 2023-10-05 DIAGNOSIS — E55.9 VITAMIN D DEFICIENCY, UNSPECIFIED: ICD-10-CM

## 2023-10-05 DIAGNOSIS — I10 PRIMARY HYPERTENSION: Primary | ICD-10-CM

## 2023-10-05 DIAGNOSIS — F32.5 MAJOR DEPRESSIVE DISORDER, SINGLE EPISODE, IN FULL REMISSION (HCC): ICD-10-CM

## 2023-10-05 DIAGNOSIS — E78.2 MIXED HYPERLIPIDEMIA: ICD-10-CM

## 2023-10-05 DIAGNOSIS — M17.0 BILATERAL PRIMARY OSTEOARTHRITIS OF KNEE: ICD-10-CM

## 2023-10-05 DIAGNOSIS — E55.9 VITAMIN D DEFICIENCY: ICD-10-CM

## 2023-10-05 DIAGNOSIS — K21.9 GASTROESOPHAGEAL REFLUX DISEASE WITHOUT ESOPHAGITIS: ICD-10-CM

## 2023-10-05 PROCEDURE — 1123F ACP DISCUSS/DSCN MKR DOCD: CPT | Performed by: NURSE PRACTITIONER

## 2023-10-05 PROCEDURE — G8417 CALC BMI ABV UP PARAM F/U: HCPCS | Performed by: NURSE PRACTITIONER

## 2023-10-05 PROCEDURE — 3017F COLORECTAL CA SCREEN DOC REV: CPT | Performed by: NURSE PRACTITIONER

## 2023-10-05 PROCEDURE — G8427 DOCREV CUR MEDS BY ELIG CLIN: HCPCS | Performed by: NURSE PRACTITIONER

## 2023-10-05 PROCEDURE — G8399 PT W/DXA RESULTS DOCUMENT: HCPCS | Performed by: NURSE PRACTITIONER

## 2023-10-05 PROCEDURE — 1036F TOBACCO NON-USER: CPT | Performed by: NURSE PRACTITIONER

## 2023-10-05 PROCEDURE — 1090F PRES/ABSN URINE INCON ASSESS: CPT | Performed by: NURSE PRACTITIONER

## 2023-10-05 PROCEDURE — 3074F SYST BP LT 130 MM HG: CPT | Performed by: NURSE PRACTITIONER

## 2023-10-05 PROCEDURE — 3078F DIAST BP <80 MM HG: CPT | Performed by: NURSE PRACTITIONER

## 2023-10-05 PROCEDURE — G8484 FLU IMMUNIZE NO ADMIN: HCPCS | Performed by: NURSE PRACTITIONER

## 2023-10-05 PROCEDURE — 99213 OFFICE O/P EST LOW 20 MIN: CPT | Performed by: NURSE PRACTITIONER

## 2023-10-05 RX ORDER — PAROXETINE HYDROCHLORIDE 40 MG/1
40 TABLET, FILM COATED ORAL DAILY
Qty: 93 TABLET | Refills: 1 | Status: SHIPPED | OUTPATIENT
Start: 2023-10-05

## 2023-10-05 RX ORDER — AMLODIPINE BESYLATE 10 MG/1
10 TABLET ORAL DAILY
Qty: 93 TABLET | Refills: 1 | Status: SHIPPED | OUTPATIENT
Start: 2023-10-05

## 2023-10-05 RX ORDER — ERGOCALCIFEROL 1.25 MG/1
50000 CAPSULE ORAL WEEKLY
Qty: 12 CAPSULE | Refills: 1
Start: 2023-10-05

## 2023-10-05 RX ORDER — OMEPRAZOLE 20 MG/1
20 CAPSULE, DELAYED RELEASE ORAL
Qty: 93 CAPSULE | Refills: 1 | Status: SHIPPED | OUTPATIENT
Start: 2023-10-05

## 2023-10-05 RX ORDER — SIMVASTATIN 40 MG
40 TABLET ORAL NIGHTLY
Qty: 93 TABLET | Refills: 1 | Status: SHIPPED | OUTPATIENT
Start: 2023-10-05

## 2023-10-10 PROBLEM — M17.0 BILATERAL PRIMARY OSTEOARTHRITIS OF KNEE: Status: ACTIVE | Noted: 2023-10-10

## 2023-10-10 PROBLEM — M70.51 PES ANSERINUS BURSITIS OF RIGHT KNEE: Status: RESOLVED | Noted: 2023-04-12 | Resolved: 2023-10-10

## 2023-10-10 PROBLEM — K21.9 GASTROESOPHAGEAL REFLUX DISEASE WITHOUT ESOPHAGITIS: Status: ACTIVE | Noted: 2023-10-10

## 2023-10-10 PROBLEM — K11.8 PAROTID NODULE: Status: RESOLVED | Noted: 2023-06-02 | Resolved: 2023-10-10

## 2023-10-10 PROBLEM — R22.1 MASS OF RIGHT SIDE OF NECK: Status: RESOLVED | Noted: 2023-04-12 | Resolved: 2023-10-10

## 2023-10-10 PROBLEM — R22.1 NODULE OF NECK: Status: RESOLVED | Noted: 2023-05-08 | Resolved: 2023-10-10

## 2023-10-10 NOTE — ASSESSMENT & PLAN NOTE
Initiate omeprazole prn  Avoid gut irritants such as spicy foods  Avoid laying down for 30 to 45 minutes after eating  Avoid excessive alcohol consumption

## 2023-10-10 NOTE — ASSESSMENT & PLAN NOTE
Well-controlled, continue current medications   Refilled mobic  Have cautioned patient on the use of NSAID therapy.   Strongly rec only taking NSAID as needed  Eat prior to taking nsaid to help reduce GI irritation

## 2023-10-10 NOTE — ASSESSMENT & PLAN NOTE
Borderline controlled, continue current medications   ; -120  Recommend fiber tabs daily to assist with lowering levels  Sammy in 6m  If level remains elevated, increase statin

## 2023-10-17 ENCOUNTER — TELEMEDICINE (OUTPATIENT)
Age: 73
End: 2023-10-17
Payer: MEDICARE

## 2023-10-17 DIAGNOSIS — J06.9 VIRAL URI WITH COUGH: Primary | ICD-10-CM

## 2023-10-17 PROCEDURE — 1090F PRES/ABSN URINE INCON ASSESS: CPT | Performed by: NURSE PRACTITIONER

## 2023-10-17 PROCEDURE — 99213 OFFICE O/P EST LOW 20 MIN: CPT | Performed by: NURSE PRACTITIONER

## 2023-10-17 PROCEDURE — 1123F ACP DISCUSS/DSCN MKR DOCD: CPT | Performed by: NURSE PRACTITIONER

## 2023-10-17 PROCEDURE — 3017F COLORECTAL CA SCREEN DOC REV: CPT | Performed by: NURSE PRACTITIONER

## 2023-10-17 PROCEDURE — G8427 DOCREV CUR MEDS BY ELIG CLIN: HCPCS | Performed by: NURSE PRACTITIONER

## 2023-10-17 PROCEDURE — G8399 PT W/DXA RESULTS DOCUMENT: HCPCS | Performed by: NURSE PRACTITIONER

## 2023-10-17 RX ORDER — GUAIFENESIN 600 MG/1
600 TABLET, EXTENDED RELEASE ORAL 2 TIMES DAILY
Qty: 60 TABLET | Refills: 0 | Status: SHIPPED | COMMUNITY
Start: 2023-10-17

## 2023-10-17 RX ORDER — FLUTICASONE PROPIONATE 50 MCG
1 SPRAY, SUSPENSION (ML) NASAL 2 TIMES DAILY PRN
Qty: 32 G | Refills: 0 | COMMUNITY
Start: 2023-10-17

## 2023-10-17 NOTE — PROGRESS NOTES
Internists of [address] [place] [address] [address]  [phone] [facility]/[phone] fax    10/17/2023    HPI:   Loc Casper 1950 is a pleasant White (non-) female who presents virtually for sick appt. Todays concern/HPI:  Started with cough approx 2 weeks ago. Mucous is clear. Worse at night. Chest congestion. Has postnasal drip. No fever. Covid test negative. No SOB or CP. Will have coughing spells that take her breath. Has tried nothing OTC.  Was on amoxicillin x2 tx within the last month    Review of Systems - Negative except above      Past Medical History:   Diagnosis Date    Acquired hypothyroidism 12/22/2015    Allergic rhinitis     Anxiety 8/2023    [facility], COPD    Atrophic vaginitis     Back pain     Cystitis     Degenerative disc disease, cervical 07/07/2016    Epigastric pain     Headache(784.0)     migraine    Hypercholesterolemia     Hyperlipidemia LDL goal <130 08/08/2016    Hypertension     Lymphedema 04/01/2022    Major depressive disorder, single episode, in full remission ([address]) 04/07/2023    Mixed hyperlipidemia 08/08/2016    Mixed incontinence 07/07/2016    Multiple closed fractures of ribs of right side 12/24/2019    Neck pain     Numbness of arm 12/18/2006    left    OAB (overactive bladder) 05/08/2018    Obesity     Need to loose weight to help with my knees    NORMA (obstructive sleep apnea)     no cpap    Osteopenia after menopause 04/12/2023    Pes anserinus bursitis of right knee 4/12/2023    Phlebitis of saphenous vein 04/27/2007    Left    Primary hypertension 06/06/2016    Primary osteoarthritis of right knee 04/12/2023    Thyroid disease     hypothyroidism    Urinary incontinence     Vitamin D deficiency, unspecified 12/22/2015     Past Surgical History:   Procedure Laterality Date    CERVICAL DISCECTOMY  6/7/2016    COLONOSCOPY  approximately 2013    HYSTERECTOMY (CERVIX STATUS UNKNOWN)  approximately [address]

## 2023-10-17 NOTE — ASSESSMENT & PLAN NOTE
Take Mucinex BID with 8oz of water  Flonase BID  Stay well hydrated  Avoid dairy til cough has subsided (thickens mucous)  Humidifier at night  Sleep with shoulders elevated  Call office if sx not improved

## 2023-10-17 NOTE — PROGRESS NOTES
Chief Complaint   Patient presents with    Cough     Deep rattling cough x 2weeks         1. \"Have you been to the ER, urgent care clinic since your last visit? Hospitalized since your last visit? \" No    2. \"Have you seen or consulted any other health care providers outside of the [address] [place] [address] since your last visit? \" No     3. For patients aged 43-73: Has the patient had a colonoscopy / FIT/ Cologuard? Yes - no Care Gap present      If the patient is female:    4. For patients aged 43-66: Has the patient had a mammogram within the past 2 years? NA - based on age or sex      11. For patients aged 21-65: Has the patient had a pap smear?  NA - based on age or sex

## 2023-10-20 ENCOUNTER — HOSPITAL ENCOUNTER (OUTPATIENT)
Facility: HOSPITAL | Age: 73
End: 2023-10-20
Payer: MEDICARE

## 2023-10-20 ENCOUNTER — TELEPHONE (OUTPATIENT)
Age: 73
End: 2023-10-20

## 2023-10-20 DIAGNOSIS — J06.9 UPPER RESPIRATORY TRACT INFECTION, UNSPECIFIED TYPE: ICD-10-CM

## 2023-10-20 DIAGNOSIS — R05.1 ACUTE COUGH: Primary | ICD-10-CM

## 2023-10-20 DIAGNOSIS — R05.1 ACUTE COUGH: ICD-10-CM

## 2023-10-20 PROCEDURE — 71046 X-RAY EXAM CHEST 2 VIEWS: CPT

## 2023-10-20 RX ORDER — DOXYCYCLINE 100 MG/1
100 CAPSULE ORAL 2 TIMES DAILY
Qty: 14 CAPSULE | Refills: 0 | Status: SHIPPED | OUTPATIENT
Start: 2023-10-20 | End: 2023-10-27

## 2023-10-20 RX ORDER — GUAIFENESIN AND CODEINE PHOSPHATE 100; 10 MG/5ML; MG/5ML
5 SOLUTION ORAL
Qty: 30 ML | Refills: 0 | Status: SHIPPED | OUTPATIENT
Start: 2023-10-20 | End: 2023-10-27

## 2023-10-20 NOTE — TELEPHONE ENCOUNTER
Pt informed that meds were sent to pharmacy and that an order for a CXR was placed. If possible she could go have it done today. She verbalized understanding and has no further concerns at this time.

## 2023-10-20 NOTE — TELEPHONE ENCOUNTER
Patient called and states she had an appt recently for a cough she has had and nothing has helped and the cough is still bad and keeping her up at night. Please advise.

## 2023-10-20 NOTE — TELEPHONE ENCOUNTER
Pt recently on 2 ABX -Amoxicillin. Will send rx doxycycline and cough syrup to pharmacy. Placed order for CXR to be completed today if possible     Diagnosis Orders   1. Acute cough  guaiFENesin-codeine (TUSSI-ORGANIDIN NR) 100-10 MG/5ML syrup    XR CHEST (2 VIEWS)    doxycycline monohydrate (MONODOX) 100 MG capsule      2.  Upper respiratory tract infection, unspecified type  guaiFENesin-codeine (TUSSI-ORGANIDIN NR) 100-10 MG/5ML syrup    XR CHEST (2 VIEWS)    doxycycline monohydrate (MONODOX) 100 MG capsule

## 2023-10-23 ENCOUNTER — TELEPHONE (OUTPATIENT)
Age: 73
End: 2023-10-23

## 2023-10-23 NOTE — TELEPHONE ENCOUNTER
Pt informed of results and verbalized understanding. Pt also stated that she is still sick. She says that it's starting to feel like a cold to her. Any advise?

## 2023-10-23 NOTE — RESULT ENCOUNTER NOTE
C nurses, pls contact patient with the following: (Please do not send message via [employer], contact them via phone)    CXR normal    Thank you

## 2023-10-24 ENCOUNTER — PATIENT MESSAGE (OUTPATIENT)
Age: 73
End: 2023-10-24

## 2023-10-24 DIAGNOSIS — R05.1 ACUTE COUGH: Primary | ICD-10-CM

## 2023-10-24 RX ORDER — PREDNISONE 10 MG/1
TABLET ORAL
Qty: 18 TABLET | Refills: 0 | Status: SHIPPED | OUTPATIENT
Start: 2023-10-24 | End: 2023-11-02

## 2023-10-24 NOTE — TELEPHONE ENCOUNTER
Patient tested negative for COVID. She started with a cough approximately 1st of October. I have treated the cough with Mucinex, Tussionex and doxycycline. Chest x-ray was negative. Symptoms of cough does not seem to be improving. Will send in a short blast of prednisone to see if we can get any relief there. She is currently on omeprazole for reflux. Have her increase omeprazole to 1 capsule twice a day for the next 7 to 10 days to see if symptoms improve. She also needs to sleep with her shoulders elevated. Diagnosis Orders   1.  Acute cough  predniSONE (DELTASONE) 10 MG tablet

## 2023-10-24 NOTE — TELEPHONE ENCOUNTER
From: Shabbir Martinez  To: Blas Ralph Northern  Sent: 10/24/2023 10:30 AM EDT  Subject: Horrible coughing    Dr Allison Correia, still coughing my head off! Starts off deep in my chest, can last for a while. What else can I do to get over this? ?  Bronchitis or the Acid Reflux? Would like to come in for you to evaluate this cough, if possible?

## 2023-12-31 ENCOUNTER — HOSPITAL ENCOUNTER (EMERGENCY)
Facility: HOSPITAL | Age: 73
Discharge: HOME | End: 2023-12-31
Attending: STUDENT IN AN ORGANIZED HEALTH CARE EDUCATION/TRAINING PROGRAM
Payer: MEDICARE

## 2023-12-31 ENCOUNTER — APPOINTMENT (OUTPATIENT)
Facility: HOSPITAL | Age: 73
End: 2023-12-31
Payer: MEDICARE

## 2023-12-31 VITALS
HEART RATE: 72 BPM | DIASTOLIC BLOOD PRESSURE: 87 MMHG | TEMPERATURE: 97.6 F | OXYGEN SATURATION: 100 % | HEIGHT: 66 IN | SYSTOLIC BLOOD PRESSURE: 157 MMHG | WEIGHT: 180 LBS | BODY MASS INDEX: 28.93 KG/M2 | RESPIRATION RATE: 16 BRPM

## 2023-12-31 DIAGNOSIS — S20.212A CONTUSION OF RIB ON LEFT SIDE, INITIAL ENCOUNTER: Primary | ICD-10-CM

## 2023-12-31 PROCEDURE — 73030 X-RAY EXAM OF SHOULDER: CPT

## 2023-12-31 PROCEDURE — 99283 EMERGENCY DEPT VISIT LOW MDM: CPT

## 2023-12-31 PROCEDURE — 71101 X-RAY EXAM UNILAT RIBS/CHEST: CPT

## 2023-12-31 RX ORDER — LIDOCAINE 50 MG/G
1 PATCH TOPICAL DAILY
Qty: 10 PATCH | Refills: 0 | Status: SHIPPED | OUTPATIENT
Start: 2023-12-31 | End: 2024-01-10

## 2023-12-31 RX ORDER — NAPROXEN 250 MG/1
250 TABLET ORAL 2 TIMES DAILY WITH MEALS
Qty: 60 TABLET | Refills: 0 | Status: SHIPPED | OUTPATIENT
Start: 2023-12-31

## 2023-12-31 ASSESSMENT — PAIN SCALES - GENERAL: PAINLEVEL_OUTOF10: 9

## 2023-12-31 ASSESSMENT — PAIN DESCRIPTION - LOCATION: LOCATION: RIB CAGE

## 2023-12-31 ASSESSMENT — PAIN DESCRIPTION - ORIENTATION: ORIENTATION: LEFT

## 2023-12-31 ASSESSMENT — LIFESTYLE VARIABLES
HOW MANY STANDARD DRINKS CONTAINING ALCOHOL DO YOU HAVE ON A TYPICAL DAY: PATIENT DOES NOT DRINK
HOW OFTEN DO YOU HAVE A DRINK CONTAINING ALCOHOL: NEVER

## 2023-12-31 NOTE — ED PROVIDER NOTES
Alcohol use: No    Drug use: No       Allergies:  No Known Allergies      Review of Systems       Review of Systems   Constitutional:  Negative for activity change, fatigue and fever.   Respiratory:  Negative for chest tightness and shortness of breath.    Cardiovascular:  Negative for chest pain.   Gastrointestinal:  Negative for abdominal pain, diarrhea, nausea and vomiting.   Musculoskeletal:  Positive for myalgias (left rib cage). Negative for arthralgias.   Skin:  Negative for rash and wound.   Neurological:  Negative for dizziness, weakness, light-headedness, numbness and headaches.   Psychiatric/Behavioral:  Negative for agitation.          Physical Exam   BP (!) 157/87   Pulse 72   Temp 97.6 °F (36.4 °C)   Resp 16   Ht 1.676 m (5' 6\")   Wt 81.6 kg (180 lb)   SpO2 100%   BMI 29.05 kg/m²       Physical Exam  Constitutional:       General: She is not in acute distress.     Appearance: She is not ill-appearing.   HENT:      Head: Normocephalic and atraumatic.      Mouth/Throat:      Mouth: Mucous membranes are moist.   Eyes:      Extraocular Movements: Extraocular movements intact.      Pupils: Pupils are equal, round, and reactive to light.   Cardiovascular:      Rate and Rhythm: Normal rate and regular rhythm.   Pulmonary:      Effort: Pulmonary effort is normal.      Breath sounds: Normal breath sounds.   Abdominal:      General: Abdomen is flat.      Palpations: Abdomen is soft.      Tenderness: There is no abdominal tenderness.   Musculoskeletal:         General: Tenderness (left rib cage tenderness, no flail chest) present. No swelling or deformity. Normal range of motion.      Cervical back: Normal range of motion and neck supple.   Skin:     General: Skin is warm and dry.      Capillary Refill: Capillary refill takes less than 2 seconds.   Neurological:      General: No focal deficit present.      Mental Status: She is alert and oriented to person, place, and time.      Cranial Nerves: No cranial

## 2023-12-31 NOTE — ED NOTES
Discharge instructions reviewed with patient. Patient advised to follow up as directed on discharge instructions.  Patient denies questions, needs or concerns at this time.  Patient verbalized understanding. No s/sx of distress noted.

## 2023-12-31 NOTE — ED TRIAGE NOTES
Patient states she fell in a parking lot last night. Denies hitting head. She c/o left rib cage, left shoulder pain. Ambulates with steady gait. Denies taking blood thinners.

## 2024-02-01 DIAGNOSIS — F32.5 MAJOR DEPRESSIVE DISORDER, SINGLE EPISODE, IN FULL REMISSION (HCC): ICD-10-CM

## 2024-02-01 RX ORDER — PAROXETINE HYDROCHLORIDE 40 MG/1
40 TABLET, FILM COATED ORAL DAILY
Qty: 90 TABLET | Refills: 3 | OUTPATIENT
Start: 2024-02-01

## 2024-02-02 NOTE — TELEPHONE ENCOUNTER
Detail Level: Zone Former patient of Dr. Sharlene Watts. She transferred to care to another Dr but she is unhappy with that practice. She would like to come back here. Will you see her? Comments (Non-Sticky): Gentle cleansing, massage w detox 2, enzyme mask, pigment gel, intellishade clear, lumiere eye, lip replenishing Price (Use Numbers Only, No Special Characters Or $): 175 Extraction Method: 11 blade and comedo extractor Exfoliation Type Override: circadia honey scrub Treatment Type Override: Facial

## 2024-02-18 DIAGNOSIS — E03.9 ACQUIRED HYPOTHYROIDISM: ICD-10-CM

## 2024-02-19 RX ORDER — LEVOTHYROXINE SODIUM 112 UG/1
112 TABLET ORAL
Qty: 60 TABLET | Refills: 0 | Status: SHIPPED | OUTPATIENT
Start: 2024-02-19

## 2024-02-19 NOTE — TELEPHONE ENCOUNTER
Should not be allergies I gave her a year supply early April 2023.  I will send over 60 tablets to hold her until her appointment on 4/3/2024.

## 2024-02-27 ENCOUNTER — TRANSCRIBE ORDERS (OUTPATIENT)
Facility: HOSPITAL | Age: 74
End: 2024-02-27

## 2024-02-27 DIAGNOSIS — R10.31 ABDOMINAL PAIN, RIGHT LOWER QUADRANT: Primary | ICD-10-CM

## 2024-03-05 ENCOUNTER — HOSPITAL ENCOUNTER (OUTPATIENT)
Facility: HOSPITAL | Age: 74
Discharge: HOME | End: 2024-03-08
Attending: SURGERY
Payer: MEDICARE

## 2024-03-05 DIAGNOSIS — R10.31 ABDOMINAL PAIN, RIGHT LOWER QUADRANT: ICD-10-CM

## 2024-03-05 PROCEDURE — 82565 ASSAY OF CREATININE: CPT

## 2024-03-05 PROCEDURE — 74177 CT ABD & PELVIS W/CONTRAST: CPT

## 2024-03-05 PROCEDURE — 6360000004 HC RX CONTRAST MEDICATION: Performed by: SURGERY

## 2024-03-05 RX ADMIN — IOPAMIDOL 100 ML: 755 INJECTION, SOLUTION INTRAVENOUS at 12:29

## 2024-03-06 LAB — CREAT UR-MCNC: 0.7 MG/DL (ref 0.6–1.3)

## 2024-03-14 ENCOUNTER — PATIENT MESSAGE (OUTPATIENT)
Age: 74
End: 2024-03-14

## 2024-03-14 NOTE — TELEPHONE ENCOUNTER
From: Felipa Toledo  To: Nathalia Reyes  Sent: 3/14/2024 10:52 AM EDT  Subject: Anxiety    Would it be OK to take my Paxil twice a day? Multiple issues going on and anxiety is through the roof!

## 2024-03-14 NOTE — TELEPHONE ENCOUNTER
You can take a maximum of 60mg per day. So you can take 1 tab (40mg) in the morning and 1/2 tab (20mg) in the evening. If this is not effective, please make a virtual appointment to discuss options. I hope all works out for you.

## 2024-03-20 ENCOUNTER — PATIENT MESSAGE (OUTPATIENT)
Age: 74
End: 2024-03-20

## 2024-03-20 DIAGNOSIS — N28.89 RIGHT RENAL MASS: Primary | ICD-10-CM

## 2024-03-20 NOTE — TELEPHONE ENCOUNTER
From: Felipa Toledo  To: Nathalia Reyes  Sent: 3/20/2024 1:27 PM EDT  Subject: Request    Dr Reyes, I have been referred to [facility] to investigate a cyst found on my kidney while having a CT Scan ordered by Dr Roth.   They are asking you to order a Radiologist recommended imaging before seeing me. Please advise.

## 2024-03-21 PROBLEM — N28.89 RIGHT RENAL MASS: Status: ACTIVE | Noted: 2024-03-21

## 2024-03-21 NOTE — TELEPHONE ENCOUNTER
Patient saw Dr. Salmon who obtained CT abdomen/pelvis on 3/7/24.  Dr. Salmon referred patient to urology.  Urology inform patient she needed her PCP to complete a CT of abdomen/pelvis per radiology recommendations.     3/7/2024 CT abdomen/pelvis:  IMPRESSION:  1.  There is an indeterminate 1.7 cm right renal lesion.  Recommend CT with and  without contrast for further evaluation.  Potentially suspicious for right renal  neoplastic process.  2.  Mildly dilated common bile duct, likely secondary to age.  If there is  concern for choledocholithiasis, recommend MRI with MRCP.  Correlate with liver  function enzymes and bilirubin.  3.  Moderate colonic stool burden.     3/21/2024 placed order for CT abdomen/pelvis with/without contrast     Diagnosis Orders   1. Right renal mass  CT ABDOMEN PELVIS W WO CONTRAST Additional Contrast? Radiologist Recommendation

## 2024-03-21 NOTE — ASSESSMENT & PLAN NOTE
Patient saw Dr. Salmon who obtained CT abdomen/pelvis on 3/7/24.  Dr. Salmon referred patient to urology.  Urology inform patient she needed her PCP to complete a CT of abdomen/pelvis per radiology recommendations.    3/7/2024 CT abdomen/pelvis:  IMPRESSION:  1.  There is an indeterminate 1.7 cm right renal lesion.  Recommend CT with and  without contrast for further evaluation.  Potentially suspicious for right renal  neoplastic process.  2.  Mildly dilated common bile duct, likely secondary to age.  If there is  concern for choledocholithiasis, recommend MRI with MRCP.  Correlate with liver  function enzymes and bilirubin.  3.  Moderate colonic stool burden.    3/21/2024 placed order for CT abdomen/pelvis with/without contrast

## 2024-03-26 DIAGNOSIS — E03.9 ACQUIRED HYPOTHYROIDISM: ICD-10-CM

## 2024-03-26 DIAGNOSIS — I10 PRIMARY HYPERTENSION: Primary | ICD-10-CM

## 2024-03-26 DIAGNOSIS — E78.2 MIXED HYPERLIPIDEMIA: ICD-10-CM

## 2024-03-26 DIAGNOSIS — E55.9 VITAMIN D DEFICIENCY: ICD-10-CM

## 2024-03-27 ENCOUNTER — NURSE ONLY (OUTPATIENT)
Age: 74
End: 2024-03-27

## 2024-03-27 DIAGNOSIS — E78.2 MIXED HYPERLIPIDEMIA: ICD-10-CM

## 2024-03-27 DIAGNOSIS — I10 PRIMARY HYPERTENSION: ICD-10-CM

## 2024-03-27 DIAGNOSIS — E03.9 ACQUIRED HYPOTHYROIDISM: ICD-10-CM

## 2024-03-27 DIAGNOSIS — E55.9 VITAMIN D DEFICIENCY: ICD-10-CM

## 2024-03-28 LAB
25(OH)D3+25(OH)D2 SERPL-MCNC: 39.2 NG/ML (ref 30–100)
ALBUMIN SERPL-MCNC: 4.1 G/DL (ref 3.8–4.8)
ALBUMIN/GLOB SERPL: 1.6 {RATIO} (ref 1.2–2.2)
ALP SERPL-CCNC: 88 IU/L (ref 44–121)
ALT SERPL-CCNC: 11 IU/L (ref 0–32)
AST SERPL-CCNC: 14 IU/L (ref 0–40)
BILIRUB SERPL-MCNC: 0.5 MG/DL (ref 0–1.2)
BUN SERPL-MCNC: 12 MG/DL (ref 8–27)
BUN/CREAT SERPL: 14 (ref 12–28)
CALCIUM SERPL-MCNC: 9.6 MG/DL (ref 8.7–10.3)
CHLORIDE SERPL-SCNC: 104 MMOL/L (ref 96–106)
CHOLEST SERPL-MCNC: 200 MG/DL (ref 100–199)
CO2 SERPL-SCNC: 25 MMOL/L (ref 20–29)
CREAT SERPL-MCNC: 0.86 MG/DL (ref 0.57–1)
EGFRCR SERPLBLD CKD-EPI 2021: 70 ML/MIN/1.73
GLOBULIN SER CALC-MCNC: 2.6 G/DL (ref 1.5–4.5)
GLUCOSE SERPL-MCNC: 102 MG/DL (ref 70–99)
HDLC SERPL-MCNC: 46 MG/DL
LDLC SERPL CALC-MCNC: 132 MG/DL (ref 0–99)
POTASSIUM SERPL-SCNC: 4.6 MMOL/L (ref 3.5–5.2)
PROT SERPL-MCNC: 6.7 G/DL (ref 6–8.5)
SODIUM SERPL-SCNC: 144 MMOL/L (ref 134–144)
SPECIMEN STATUS REPORT: NORMAL
TRIGL SERPL-MCNC: 120 MG/DL (ref 0–149)
TSH SERPL DL<=0.005 MIU/L-ACNC: 0.7 UIU/ML (ref 0.45–4.5)
VLDLC SERPL CALC-MCNC: 22 MG/DL (ref 5–40)

## 2024-03-29 ENCOUNTER — HOSPITAL ENCOUNTER (OUTPATIENT)
Facility: HOSPITAL | Age: 74
End: 2024-03-29
Payer: MEDICARE

## 2024-03-29 DIAGNOSIS — N28.89 RIGHT RENAL MASS: ICD-10-CM

## 2024-03-29 PROCEDURE — 74178 CT ABD&PLV WO CNTR FLWD CNTR: CPT

## 2024-03-29 PROCEDURE — 6360000004 HC RX CONTRAST MEDICATION: Performed by: NURSE PRACTITIONER

## 2024-03-29 RX ADMIN — IOPAMIDOL 100 ML: 755 INJECTION, SOLUTION INTRAVENOUS at 11:34

## 2024-04-03 PROBLEM — Z71.89 ACP (ADVANCE CARE PLANNING): Status: RESOLVED | Noted: 2023-04-12 | Resolved: 2024-04-03

## 2024-04-03 PROBLEM — J06.9 VIRAL URI WITH COUGH: Status: RESOLVED | Noted: 2023-10-17 | Resolved: 2024-04-03

## 2024-04-03 PROBLEM — I89.0 LYMPHEDEMA: Status: RESOLVED | Noted: 2022-04-01 | Resolved: 2024-04-03

## 2024-04-03 PROBLEM — J30.89 ENVIRONMENTAL AND SEASONAL ALLERGIES: Status: ACTIVE | Noted: 2024-04-03

## 2024-04-03 PROBLEM — Z63.6 CAREGIVER STRESS: Status: RESOLVED | Noted: 2021-02-04 | Resolved: 2024-04-03

## 2024-04-03 PROBLEM — M72.2 PLANTAR FASCIITIS, BILATERAL: Status: RESOLVED | Noted: 2021-02-04 | Resolved: 2024-04-03

## 2024-04-03 PROBLEM — M17.11 PRIMARY OSTEOARTHRITIS OF RIGHT KNEE: Status: RESOLVED | Noted: 2023-04-12 | Resolved: 2024-04-03

## 2024-05-02 ENCOUNTER — HOSPITAL ENCOUNTER (OUTPATIENT)
Facility: HOSPITAL | Age: 74
Discharge: HOME | End: 2024-05-02
Payer: MEDICARE

## 2024-05-02 ENCOUNTER — HOSPITAL ENCOUNTER (OUTPATIENT)
Facility: HOSPITAL | Age: 74
End: 2024-05-02
Payer: MEDICARE

## 2024-05-02 VITALS — WEIGHT: 173 LBS | BODY MASS INDEX: 26.22 KG/M2 | HEIGHT: 68 IN

## 2024-05-02 DIAGNOSIS — Z12.31 BREAST CANCER SCREENING BY MAMMOGRAM: ICD-10-CM

## 2024-05-02 DIAGNOSIS — Z78.0 OSTEOPENIA AFTER MENOPAUSE: ICD-10-CM

## 2024-05-02 DIAGNOSIS — M85.80 OSTEOPENIA AFTER MENOPAUSE: ICD-10-CM

## 2024-05-02 PROCEDURE — 77063 BREAST TOMOSYNTHESIS BI: CPT

## 2024-05-02 PROCEDURE — 77080 DXA BONE DENSITY AXIAL: CPT

## 2024-05-03 ENCOUNTER — TELEPHONE (OUTPATIENT)
Age: 74
End: 2024-05-03

## 2024-05-03 PROBLEM — Z00.00 MEDICARE ANNUAL WELLNESS VISIT, SUBSEQUENT: Status: RESOLVED | Noted: 2023-04-12 | Resolved: 2024-05-03

## 2024-05-03 NOTE — TELEPHONE ENCOUNTER
----- Message from Nathalia Reyes, DNP sent at 5/3/2024  7:42 AM EDT -----  Mammo negative  DEXA reveals continued osteopenia.  Continue calcium and vitamin D supplements  Stay active.  Avoid excessive alcohol consumption.  limit caffeine to 1-2 servings per day, and avoidance of heavy alcohol consumption. Reiterate importance of fall precautions.   Thank you

## 2024-05-03 NOTE — RESULT ENCOUNTER NOTE
Mammo negative  DEXA reveals continued osteopenia.  Continue calcium and vitamin D supplements  Stay active.  Avoid excessive alcohol consumption.  limit caffeine to 1-2 servings per day, and avoidance of heavy alcohol consumption. Reiterate importance of fall precautions.   Thank you

## 2024-06-21 ENCOUNTER — OFFICE VISIT (OUTPATIENT)
Facility: CLINIC | Age: 74
End: 2024-06-21
Payer: MEDICARE

## 2024-06-21 VITALS
WEIGHT: 179 LBS | HEART RATE: 70 BPM | DIASTOLIC BLOOD PRESSURE: 78 MMHG | SYSTOLIC BLOOD PRESSURE: 120 MMHG | HEIGHT: 67 IN | BODY MASS INDEX: 28.09 KG/M2 | TEMPERATURE: 97.4 F | OXYGEN SATURATION: 97 %

## 2024-06-21 DIAGNOSIS — N32.81 OAB (OVERACTIVE BLADDER): ICD-10-CM

## 2024-06-21 DIAGNOSIS — L50.9 URTICARIA: Primary | ICD-10-CM

## 2024-06-21 DIAGNOSIS — N28.89 RIGHT RENAL MASS: ICD-10-CM

## 2024-06-21 PROCEDURE — G8399 PT W/DXA RESULTS DOCUMENT: HCPCS | Performed by: NURSE PRACTITIONER

## 2024-06-21 PROCEDURE — 1036F TOBACCO NON-USER: CPT | Performed by: NURSE PRACTITIONER

## 2024-06-21 PROCEDURE — 1123F ACP DISCUSS/DSCN MKR DOCD: CPT | Performed by: NURSE PRACTITIONER

## 2024-06-21 PROCEDURE — 3078F DIAST BP <80 MM HG: CPT | Performed by: NURSE PRACTITIONER

## 2024-06-21 PROCEDURE — 3074F SYST BP LT 130 MM HG: CPT | Performed by: NURSE PRACTITIONER

## 2024-06-21 PROCEDURE — 99213 OFFICE O/P EST LOW 20 MIN: CPT | Performed by: NURSE PRACTITIONER

## 2024-06-21 PROCEDURE — G8428 CUR MEDS NOT DOCUMENT: HCPCS | Performed by: NURSE PRACTITIONER

## 2024-06-21 PROCEDURE — G8417 CALC BMI ABV UP PARAM F/U: HCPCS | Performed by: NURSE PRACTITIONER

## 2024-06-21 PROCEDURE — 1090F PRES/ABSN URINE INCON ASSESS: CPT | Performed by: NURSE PRACTITIONER

## 2024-06-21 RX ORDER — PREDNISONE 10 MG/1
TABLET ORAL
Qty: 18 TABLET | Refills: 0 | Status: SHIPPED | OUTPATIENT
Start: 2024-06-21 | End: 2024-06-29

## 2024-06-21 RX ORDER — HYDROXYZINE HYDROCHLORIDE 25 MG/1
25 TABLET, FILM COATED ORAL 2 TIMES DAILY PRN
Qty: 30 TABLET | Refills: 0 | Status: SHIPPED | OUTPATIENT
Start: 2024-06-21 | End: 2024-07-21

## 2024-06-21 SDOH — ECONOMIC STABILITY: FOOD INSECURITY: WITHIN THE PAST 12 MONTHS, YOU WORRIED THAT YOUR FOOD WOULD RUN OUT BEFORE YOU GOT MONEY TO BUY MORE.: NEVER TRUE

## 2024-06-21 SDOH — ECONOMIC STABILITY: INCOME INSECURITY: HOW HARD IS IT FOR YOU TO PAY FOR THE VERY BASICS LIKE FOOD, HOUSING, MEDICAL CARE, AND HEATING?: NOT HARD AT ALL

## 2024-06-21 SDOH — ECONOMIC STABILITY: HOUSING INSECURITY
IN THE LAST 12 MONTHS, WAS THERE A TIME WHEN YOU DID NOT HAVE A STEADY PLACE TO SLEEP OR SLEPT IN A SHELTER (INCLUDING NOW)?: NO

## 2024-06-21 SDOH — ECONOMIC STABILITY: FOOD INSECURITY: WITHIN THE PAST 12 MONTHS, THE FOOD YOU BOUGHT JUST DIDN'T LAST AND YOU DIDN'T HAVE MONEY TO GET MORE.: NEVER TRUE

## 2024-06-21 NOTE — ASSESSMENT & PLAN NOTE
Strongly suspect rash is due to dust exposure and stress.  A prednisone taper has been prescribed, with instructions to take 3 tablets for 3 days, followed by 2 tablets for 3 days. Hydroxyzine has been prescribed, to be taken BID as needed. Hydroxyzine may cause drowsiness. She has been advised to avoid NSAIDs, Motrin, ibuprofen, and Aleve while on prednisone. .

## 2024-06-21 NOTE — PROGRESS NOTES
Felipa Tre presents today for   Chief Complaint   Patient presents with    Rash                 1. \"Have you been to the ER, urgent care clinic since your last visit?  Hospitalized since your last visit?\" no    2. \"Have you seen or consulted any other health care providers outside of the [facility] since your last visit?\" no     3. For patients aged 45-75: Has the patient had a colonoscopy / FIT/ Cologuard? Yes - no Care Gap present      If the patient is female:    4. For patients aged 40-74: Has the patient had a mammogram within the past 2 years? Yes - no Care Gap present      5. For patients aged 21-65: Has the patient had a pap smear? No    
Other         cerebral aneurysm     Social History:   She  reports that she has never smoked. She has never used smokeless tobacco.   Social History     Substance and Sexual Activity   Alcohol Use No     Immunization History:  Immunization History   Administered Date(s) Administered    COVID-19, PFIZER PURPLE top, DILUTE for use, (age 12 y+), 30mcg/0.3mL 03/06/2021, 03/21/2021    Pneumococcal, PCV-13, PREVNAR 13, (age 6w+), IM, 0.5mL 08/10/2018    Pneumococcal, PPSV23, PNEUMOVAX 23, (age 2y+), SC/IM, 0.5mL 05/23/2014    Zoster Live (Zostavax) 04/13/2009         Return if symptoms worsen or fail to improve.      Dr. Nathalia Reyes, CONCETTA-C, DNP  [facility]     The patient (or guardian, if applicable) and other individuals in attendance with the patient were advised that Artificial Intelligence will be utilized during this visit to record and process the conversation to generate a clinical note. The patient (or guardian, if applicable) and other individuals in attendance at the appointment consented to the use of AI, including the recording.

## 2024-06-21 NOTE — ASSESSMENT & PLAN NOTE
Pt seeing Dr Vazquez  She is very frustrated as her incont is worsening, causing skin irritation.   Placed referral to uro/gyne. Pt aware she may not see specialist for months  Recommend Calmoseptine to apply to irritated skin areas and to help protect skin from urine

## 2024-07-24 ENCOUNTER — PATIENT MESSAGE (OUTPATIENT)
Facility: CLINIC | Age: 74
End: 2024-07-24

## 2024-07-25 DIAGNOSIS — L50.9 URTICARIA: Primary | ICD-10-CM

## 2024-07-25 NOTE — TELEPHONE ENCOUNTER
At this point I think you need to see a dermatologist. Im glad to hear the prednisone cured your rash last month. I recommend you continue to take the allergy pill everyday to help rousseau off severity of rash. The hydroxyzine you did not  from [facility] was for itching. I have placed the referral to [facility] on [address]. They may MULTIPLE offices you can choose from.     PS: Sue glad to hear you took your first trip in your !! Enjoy!

## 2024-07-25 NOTE — TELEPHONE ENCOUNTER
From: Felipa Toledo  To: Nathalia Reyes  Sent: 7/24/2024 8:59 AM EDT  Subject: Skin stress    Dr. Reyes, my skin rash you treated at my last visit got better but now it's back! My mid section and now runs down my legs. Took steroid but was never able to get the other medication from [facility].  What should I take besides my allergy pill that I take everyday?  PS Took our first trip on the  last weekend!

## 2024-07-29 PROBLEM — R41.3 MEMORY CHANGES: Status: ACTIVE | Noted: 2024-07-29

## 2024-09-25 DIAGNOSIS — F32.5 MAJOR DEPRESSIVE DISORDER, SINGLE EPISODE, IN FULL REMISSION (HCC): ICD-10-CM

## 2024-09-25 DIAGNOSIS — I10 PRIMARY HYPERTENSION: ICD-10-CM

## 2024-09-25 DIAGNOSIS — M17.0 BILATERAL PRIMARY OSTEOARTHRITIS OF KNEE: ICD-10-CM

## 2024-09-25 RX ORDER — AMLODIPINE BESYLATE 10 MG/1
10 TABLET ORAL DAILY
Qty: 93 TABLET | Refills: 1 | Status: SHIPPED | OUTPATIENT
Start: 2024-09-25

## 2024-09-25 RX ORDER — PAROXETINE 40 MG/1
40 TABLET, FILM COATED ORAL DAILY
Qty: 93 TABLET | Refills: 1 | Status: SHIPPED | OUTPATIENT
Start: 2024-09-25

## 2024-10-16 ENCOUNTER — OFFICE VISIT (OUTPATIENT)
Facility: CLINIC | Age: 74
End: 2024-10-16
Payer: MEDICARE

## 2024-10-16 VITALS
HEART RATE: 86 BPM | SYSTOLIC BLOOD PRESSURE: 138 MMHG | HEIGHT: 67 IN | BODY MASS INDEX: 29.03 KG/M2 | WEIGHT: 185 LBS | TEMPERATURE: 97.1 F | DIASTOLIC BLOOD PRESSURE: 80 MMHG | RESPIRATION RATE: 16 BRPM | OXYGEN SATURATION: 96 %

## 2024-10-16 DIAGNOSIS — R20.2 PARESTHESIA OF LEFT UPPER AND LOWER EXTREMITY: Primary | ICD-10-CM

## 2024-10-16 DIAGNOSIS — R73.03 PREDIABETES: ICD-10-CM

## 2024-10-16 DIAGNOSIS — E03.9 ACQUIRED HYPOTHYROIDISM: ICD-10-CM

## 2024-10-16 DIAGNOSIS — M17.0 BILATERAL PRIMARY OSTEOARTHRITIS OF KNEE: ICD-10-CM

## 2024-10-16 PROCEDURE — 99214 OFFICE O/P EST MOD 30 MIN: CPT | Performed by: NURSE PRACTITIONER

## 2024-10-16 PROCEDURE — G8399 PT W/DXA RESULTS DOCUMENT: HCPCS | Performed by: NURSE PRACTITIONER

## 2024-10-16 PROCEDURE — G8428 CUR MEDS NOT DOCUMENT: HCPCS | Performed by: NURSE PRACTITIONER

## 2024-10-16 PROCEDURE — G8484 FLU IMMUNIZE NO ADMIN: HCPCS | Performed by: NURSE PRACTITIONER

## 2024-10-16 PROCEDURE — 1090F PRES/ABSN URINE INCON ASSESS: CPT | Performed by: NURSE PRACTITIONER

## 2024-10-16 PROCEDURE — 1123F ACP DISCUSS/DSCN MKR DOCD: CPT | Performed by: NURSE PRACTITIONER

## 2024-10-16 PROCEDURE — 3075F SYST BP GE 130 - 139MM HG: CPT | Performed by: NURSE PRACTITIONER

## 2024-10-16 PROCEDURE — G8417 CALC BMI ABV UP PARAM F/U: HCPCS | Performed by: NURSE PRACTITIONER

## 2024-10-16 PROCEDURE — 1036F TOBACCO NON-USER: CPT | Performed by: NURSE PRACTITIONER

## 2024-10-16 PROCEDURE — 3079F DIAST BP 80-89 MM HG: CPT | Performed by: NURSE PRACTITIONER

## 2024-10-16 RX ORDER — CELECOXIB 100 MG/1
100 CAPSULE ORAL 2 TIMES DAILY PRN
Qty: 60 CAPSULE | Refills: 3 | Status: SHIPPED | OUTPATIENT
Start: 2024-10-16

## 2024-10-16 RX ORDER — LEVOTHYROXINE SODIUM 100 UG/1
100 TABLET ORAL DAILY
Qty: 90 TABLET | Refills: 1 | Status: SHIPPED | OUTPATIENT
Start: 2024-10-16

## 2024-10-16 NOTE — PROGRESS NOTES
\"Have you been to the ER, urgent care clinic since your last visit?  Hospitalized since your last visit?\"    YES - When: approximately 3 days ago.  Where and Why: [facility] for dizziness.    “Have you seen or consulted any other health care providers outside our system since your last visit?”    NO

## 2024-10-16 NOTE — PROGRESS NOTES
Internists of [address]  [address]  [place] [address]  [phone] office/[phone] fax    10/17/2024    Felipa Toledo 1950 is a pleasant White (non-) female.       History of Present Illness  The patient is a 74-year-old female who presents for evaluation of multiple medical concerns. She is accompanied by her spouse.    On Thursday morning, she experienced a fall after feeling dizzy upon standing from the couch. Following the fall, she reported pain on the left side of her head and numbness in her arm that extended to her leg. She initially attributed these symptoms to her ear, but the dizziness subsided while the numbness persisted. After three days, she sought emergency care where no significant findings were reported. A head scan at the ER was normal, but a subsequent scan with contrast revealed a slight enlargement on the left side. She continues to experience numbness and tingling on her entire left side, although less severe than initially. She did not receive any treatment or imaging for her neck.    She has an InterStim device implanted in her right upper buttock, which prevented her from undergoing an MRI. She plans to consult Dr. Vazquez in December 2024 about removing the device as it has not been beneficial. She also inquired about new medications for overactive bladder.     She had a disc replaced in her back in 2016 and underwent anterior cervical fusion the same year.  She has been on diclofenac for greater than 5 years and cannot go without taking something for pain.  Hospitalist instructed patient to discontinue the diclofenac due to may enhance her numbness and tingling. She reported difficulty walking when she missed her Voltaren for a week. She has not taken Aleve since starting other medications. Despite not taking diclofenac for three days, she feels dependent on it.    She takes Synthroid 112 mcg daily    She woke up one morning feeling

## 2024-10-17 PROBLEM — R20.2 PARESTHESIA OF LEFT UPPER AND LOWER EXTREMITY: Status: ACTIVE | Noted: 2024-10-17

## 2024-10-17 PROBLEM — R73.03 PREDIABETES: Status: ACTIVE | Noted: 2024-10-17

## 2024-10-17 NOTE — ASSESSMENT & PLAN NOTE
Her A1c was elevated at 6.1. She is advised to monitor her diet and maintain a healthy lifestyle. If her A1c increases above 6.4, metformin will be considered.

## 2024-10-17 NOTE — ASSESSMENT & PLAN NOTE
Seen in the ED.  Stroke rule out  ED unable to complete an MRI due to patient having an InterStim  ED recommended patient seeing neurosurgery  Referral placed    Imaging  10/13/2024 CT scan of head without contrast  10/14/2024 CT arteriogram of the head neck normal  10/14/2024 echocardiogram ejection fraction 60% normal left atrial pressure with grade 1 diastolic dysfunction left atrial chamber mildly enlarged

## 2024-10-17 NOTE — ASSESSMENT & PLAN NOTE
Diclofenac will be discontinued and replaced with Celebrex, to be taken 1 to 2 times daily as needed. Hospitalist told her to stop diclofenac. She is advised against taking Motrin, ibuprofen, or Aleve concurrently with Celebrex. For headaches, Tylenol is recommended. A prescription for Celebrex will be sent to [facility].

## 2024-10-17 NOTE — ASSESSMENT & PLAN NOTE
Reviewed labs from the ED and noted TSH to be low at 0.26  DC Synthroid 112 mics  Initiate Synthroid 100 mics  Sammy TSH level 11/18/24    4/3/2024 OV note:  TSH 0.7  Recheck level 12 months  Take Synthroid on an empty stomach first thing in the morning. Avoid eating, drinking (except water) or taking any other medications for at least 45 minutes after taking Synthroid as this may interfere with the absorption/effectiveness of Synthroid.

## 2024-11-07 NOTE — PROGRESS NOTES
Patient: Felipa Toledo                MRN: [id number]       SSN: [id number]  YOB: 1950        AGE: 74 y.o.        SEX: female      PCP: Nathalia Reyes, DNP  11/08/24    Chief Complaint   Patient presents with    Knee Pain     Bilateral knee pain L > R     HISTORY:  Felipa Toledo is a 74 y.o. female She is seen for several year history of  bilateral knee pain (L>R).  She denies any recent injury.  Her knee arthritis pain throws off her gait so she trips a lot. She feels pain with standing, walking and stair climbing.  She experiences startup pain after sitting. She was previously seen by Dr. Marin. She now presents for a second orthopedic opinion.  She underwent left knee arthroscopy by Dr. Marin years ago but it only helped for couple of months. Previous PT did not help. Voltaren gel helps some.     She was previously seen by Dr. Whiteside for right knee pain and Dr. Merritt for left knee and right leg pain. She was previously seen by Dr. Odonnell for right hand and neck pain and by Dr. Farley for bilateral middle finger triggering.      Occupation, etc: Ms. Toledo is retired. She previously worked as a  for [email or web address] and [employer]. She lives in [place] with her . She has 11 children total because her  has 9 children and she has 2. She has 20 grandchildren and 16 great grandchildren. She was an only child so she is happy to have a large family. She weighs 185 lbs and is 5'7\". She is not diabetic or hypertensive. She was previously taking Diclofenac but her PCP switched her to Celebrex and it does not help as much.   Wt Readings from Last 3 Encounters:   10/16/24 83.9 kg (185 lb)   06/21/24 81.2 kg (179 lb)   06/13/24 79.4 kg (175 lb)      There is no height or weight on file to calculate BMI.    Patient Active Problem List   Diagnosis    NORMA (obstructive sleep apnea)    Primary hypertension    OAB (overactive bladder)    Mixed

## 2024-11-08 ENCOUNTER — OFFICE VISIT (OUTPATIENT)
Age: 74
End: 2024-11-08

## 2024-11-08 DIAGNOSIS — G89.29 CHRONIC PAIN OF RIGHT KNEE: ICD-10-CM

## 2024-11-08 DIAGNOSIS — M25.561 CHRONIC PAIN OF RIGHT KNEE: ICD-10-CM

## 2024-11-08 DIAGNOSIS — M17.0 BILATERAL PRIMARY OSTEOARTHRITIS OF KNEE: Primary | ICD-10-CM

## 2024-11-08 DIAGNOSIS — M25.562 CHRONIC PAIN OF LEFT KNEE: ICD-10-CM

## 2024-11-08 DIAGNOSIS — M85.80 OSTEOPENIA, UNSPECIFIED LOCATION: ICD-10-CM

## 2024-11-08 DIAGNOSIS — M17.12 UNILATERAL PRIMARY OSTEOARTHRITIS, LEFT KNEE: ICD-10-CM

## 2024-11-08 DIAGNOSIS — M17.11 UNILATERAL PRIMARY OSTEOARTHRITIS, RIGHT KNEE: ICD-10-CM

## 2024-11-08 DIAGNOSIS — G89.29 CHRONIC PAIN OF LEFT KNEE: ICD-10-CM

## 2024-11-08 RX ORDER — BUPIVACAINE HYDROCHLORIDE 5 MG/ML
4 INJECTION, SOLUTION PERINEURAL ONCE
Status: COMPLETED | OUTPATIENT
Start: 2024-11-08 | End: 2024-11-08

## 2024-11-08 RX ORDER — BETAMETHASONE SODIUM PHOSPHATE AND BETAMETHASONE ACETATE 3; 3 MG/ML; MG/ML
3 INJECTION, SUSPENSION INTRA-ARTICULAR; INTRALESIONAL; INTRAMUSCULAR; SOFT TISSUE ONCE
Status: COMPLETED | OUTPATIENT
Start: 2024-11-08 | End: 2024-11-08

## 2024-11-08 RX ADMIN — BUPIVACAINE HYDROCHLORIDE 20 MG: 5 INJECTION, SOLUTION PERINEURAL at 15:36

## 2024-11-08 RX ADMIN — BETAMETHASONE SODIUM PHOSPHATE AND BETAMETHASONE ACETATE 3 MG: 3; 3 INJECTION, SUSPENSION INTRA-ARTICULAR; INTRALESIONAL; INTRAMUSCULAR; SOFT TISSUE at 15:36

## 2024-11-08 RX ADMIN — BUPIVACAINE HYDROCHLORIDE 20 MG: 5 INJECTION, SOLUTION PERINEURAL at 15:35

## 2024-11-08 SDOH — HEALTH STABILITY: PHYSICAL HEALTH: ON AVERAGE, HOW MANY DAYS PER WEEK DO YOU ENGAGE IN MODERATE TO STRENUOUS EXERCISE (LIKE A BRISK WALK)?: 0 DAYS

## 2024-11-20 DIAGNOSIS — E03.9 ACQUIRED HYPOTHYROIDISM: Primary | ICD-10-CM

## 2024-11-21 LAB — TSH SERPL DL<=0.005 MIU/L-ACNC: 0.22 UIU/ML (ref 0.45–4.5)

## 2024-11-22 RX ORDER — LEVOTHYROXINE SODIUM 88 UG/1
88 TABLET ORAL DAILY
Qty: 30 TABLET | Refills: 0 | Status: SHIPPED | OUTPATIENT
Start: 2024-11-22

## 2024-11-22 NOTE — PROGRESS NOTES
Spoke w/ pt and she stated that she was never informed that she had to take her synthroid by itself with nothing to eat or drink or any other medications for at least 45 minutes. Per pt she has been taking the synthroid with all of her medications and while eating the entire duration that she has been on this medication. Pt verbalized instructions and scheduled for the following;  Future Appointments   Date Time Provider Department Center   12/16/2024 11:40 AM Torri Cheng PA-C [facility]   12/17/2024 11:15 AM [facility] LAB VISIT [facility]   4/1/2025  9:00 AM [facility] LAB VISIT [facility]   4/8/2025  1:15 PM Nathalia Reyes DNP [facility]

## 2024-11-22 NOTE — PROGRESS NOTES
TSH remains low at 0.221 despite reducing Synthroid from 112 mics to 100 mics    Mayelin, please ensure patient is taking Synthroid by itself with nothing to eat or drink or any other medications for at least 45 minutes after taking Synthroid.  Please inform patient I have DC'd Synthroid 100 mics and sent prescription to her pharmacy for Synthroid 88 mics for 30 days.  Please have patient recheck her TSH the week of 12/16/2024.    Thank you        10/16/2024 OV note:  Acquired hypothyroidism  Assessment & Plan:  Reviewed labs from the ED and noted TSH to be low at 0.26  DC Synthroid 112 mics  Initiate Synthroid 100 mics  Sammy TSH level 11/18/24

## 2024-11-27 NOTE — PROGRESS NOTES
Patient: Felipa Toledo                MRN: [id number]       SSN: [id number]  YOB: 1950        AGE: 74 y.o.        SEX: female      PCP: Nathalia Reyes, PHIL  12/05/24    Chief Complaint   Patient presents with    Knee Pain     Bilateral knee orthovisc #1      HISTORY:  Felipa Toledo is a 74 y.o. female who is seen for bilateral knee pain. She presents today for her first injection in the Orthovisc visco supplementation series.    She was previously seen for bilateral knee pain (L>R).  She denies any recent injury.  Her knee arthritis pain throws off her gait so she trips a lot. She feels pain with standing, walking and stair climbing.  She experiences startup pain after sitting. She was previously seen by Dr. Marin. She now presents for a second orthopedic opinion.  She underwent left knee arthroscopy by Dr. Marin years ago but it only helped for couple of months. Previous PT did not help. Voltaren gel helps some.      She was previously seen by Dr. Whiteside for right knee pain and Dr. Merritt for left knee and right leg pain. She was previously seen by Dr. Odonnell for right hand and neck pain and by Dr. Farley for bilateral middle finger triggering.       Occupation, etc: Ms. Toledo is retired. She previously worked as a  for [employer] and [employer]. She lives in [place] with her . She has 11 children total because her  has 9 children and she has 2. She has 20 grandchildren and 16 great grandchildren. She was an only child so she is happy to have a large family. She weighs 185 lbs and is 5'7\". She is not diabetic or hypertensive. She was previously taking Diclofenac but her PCP switched her to Celebrex and it does not help as much.   Wt Readings from Last 3 Encounters:   12/05/24 83.9 kg (185 lb)   10/16/24 83.9 kg (185 lb)   06/21/24 81.2 kg (179 lb)      Body mass index is 28.98 kg/m².    Patient Active Problem List   Diagnosis

## 2024-12-05 ENCOUNTER — OFFICE VISIT (OUTPATIENT)
Age: 74
End: 2024-12-05
Payer: MEDICARE

## 2024-12-05 VITALS — HEIGHT: 67 IN | BODY MASS INDEX: 29.03 KG/M2 | WEIGHT: 185 LBS

## 2024-12-05 DIAGNOSIS — M17.11 UNILATERAL PRIMARY OSTEOARTHRITIS, RIGHT KNEE: Primary | ICD-10-CM

## 2024-12-05 DIAGNOSIS — M17.12 UNILATERAL PRIMARY OSTEOARTHRITIS, LEFT KNEE: ICD-10-CM

## 2024-12-05 PROCEDURE — 20610 DRAIN/INJ JOINT/BURSA W/O US: CPT | Performed by: SPECIALIST

## 2024-12-06 NOTE — PROGRESS NOTES
Marcaine and 0.5 cc Celestone. Patient's knees injected with 2 cc of Euflexxa. She will follow up in one week for her third injection in the Euflexxa visco supplementation series.    Documentation by bon Bella, as documented by Darron Tovar MD.

## 2024-12-12 ENCOUNTER — OFFICE VISIT (OUTPATIENT)
Age: 74
End: 2024-12-12

## 2024-12-12 VITALS — WEIGHT: 185 LBS | HEIGHT: 67 IN | BODY MASS INDEX: 29.03 KG/M2

## 2024-12-12 DIAGNOSIS — M17.12 UNILATERAL PRIMARY OSTEOARTHRITIS, LEFT KNEE: ICD-10-CM

## 2024-12-12 DIAGNOSIS — M17.11 UNILATERAL PRIMARY OSTEOARTHRITIS, RIGHT KNEE: Primary | ICD-10-CM

## 2024-12-12 DIAGNOSIS — M25.551 RIGHT HIP PAIN: ICD-10-CM

## 2024-12-12 DIAGNOSIS — M25.552 LEFT HIP PAIN: ICD-10-CM

## 2024-12-12 DIAGNOSIS — M70.62 TROCHANTERIC BURSITIS, LEFT HIP: ICD-10-CM

## 2024-12-12 DIAGNOSIS — M70.61 TROCHANTERIC BURSITIS, RIGHT HIP: ICD-10-CM

## 2024-12-12 RX ORDER — BUPIVACAINE HYDROCHLORIDE 5 MG/ML
4 INJECTION, SOLUTION PERINEURAL ONCE
Status: COMPLETED | OUTPATIENT
Start: 2024-12-12 | End: 2024-12-12

## 2024-12-12 RX ORDER — BETAMETHASONE SODIUM PHOSPHATE AND BETAMETHASONE ACETATE 3; 3 MG/ML; MG/ML
3 INJECTION, SUSPENSION INTRA-ARTICULAR; INTRALESIONAL; INTRAMUSCULAR; SOFT TISSUE ONCE
Status: COMPLETED | OUTPATIENT
Start: 2024-12-12 | End: 2024-12-12

## 2024-12-12 RX ADMIN — BUPIVACAINE HYDROCHLORIDE 20 MG: 5 INJECTION, SOLUTION PERINEURAL at 11:01

## 2024-12-12 RX ADMIN — BETAMETHASONE SODIUM PHOSPHATE AND BETAMETHASONE ACETATE 3 MG: 3; 3 INJECTION, SUSPENSION INTRA-ARTICULAR; INTRALESIONAL; INTRAMUSCULAR; SOFT TISSUE at 11:00

## 2024-12-12 RX ADMIN — BETAMETHASONE SODIUM PHOSPHATE AND BETAMETHASONE ACETATE 3 MG: 3; 3 INJECTION, SUSPENSION INTRA-ARTICULAR; INTRALESIONAL; INTRAMUSCULAR; SOFT TISSUE at 10:59

## 2024-12-12 RX ADMIN — BUPIVACAINE HYDROCHLORIDE 20 MG: 5 INJECTION, SOLUTION PERINEURAL at 11:00

## 2024-12-16 ENCOUNTER — HOSPITAL ENCOUNTER (OUTPATIENT)
Facility: HOSPITAL | Age: 74
Setting detail: RECURRING SERIES
Discharge: HOME | End: 2024-12-19
Payer: MEDICARE

## 2024-12-16 PROCEDURE — 97161 PT EVAL LOW COMPLEX 20 MIN: CPT

## 2024-12-16 NOTE — PROGRESS NOTES
PHYSICAL / OCCUPATIONAL THERAPY - DAILY TREATMENT NOTE (updated )    Patient Name: Felipa Toledo    Date: 2024    : 1950  Insurance: Payor: HU[employer]NA MEDICARE / Plan: HU[employer] GOLD PLUS HMO / Product Type: *No Product type* /      Patient  verified Yes     Visit #   Current / Total 1 24   Time   In / Out 3:24 4:06   Pain   In / Out 4 4   Subjective Functional Status/Changes: See POC     TREATMENT AREA =  Pain in left knee [M25.562]  Pain in right knee [M25.561]    OBJECTIVE    22 min   Eval - untimed                      Therapeutic Procedures:  Tx Min Billable or 1:1 Min (if diff from Tx Min) Procedure, Rationale, Specifics   10 0 03032 Therapeutic Exercise (timed):  increase ROM, strength, coordination, balance, and proprioception to improve patient's ability to progress to PLOF and address remaining functional goals. (see flow sheet as applicable)     Details if applicable:  HEP instruction and demonstration     10 0 61711 Self Care/Home Management (timed):  improve patient knowledge and understanding of home injury/symptom/pain management, positioning, posture/ergonomics, home safety, activity modification, transfer techniques, and joint protection strategies  to improve patient's ability to progress to PLOF and address remaining functional goals.  (see flow sheet as applicable)     Details if applicable:  pt education on relevant anatomy/physiology, pt education on the benefits of aquatic therapy with chronic pain and arthritis    20 0 MC BC Totals Reminder: bill using total billable min of TIMED therapeutic procedures (example: do not include dry needle or estim unattended, both untimed codes, in totals to left)  8-22 min = 1 unit; 23-37 min = 2 units; 38-52 min = 3 units; 53-67 min = 4 units; 68-82 min = 5 units   Total Total     TOTAL TREATMENT TIME:        42     [x]  Patient Education billed concurrently with other procedures   [x] Review HEP    [] Progressed/Changed HEP, detail:    []

## 2024-12-16 NOTE — PROGRESS NOTES
[facility]  [address], [place] [address] Phone: [phone] Fax [phone]  Plan of Care / Statement of Necessity for Physical Therapy Services     Patient Name: Felipa Toledo : 1950   Treatment   Diagnosis: M25.561  RIGHT KNEE PAIN and M25.562  LEFT KNEE PAIN  Medical Diagnosis: Pain in left knee [M25.562]  Pain in right knee [M25.561]   Onset Date: Chronic in nature, order date 2024 Payor Source: Payor: HU[employer]NA MEDICARE / Plan: HU[employer]NA GOLD PLUS HMO / Product Type: *No Product type* /    Referral Source: Darron Tovar MD Start of Care (SOC): 2024   Prior Hospitalization: See medical history Provider #: [id number]   Prior Level of Function: Independent with ADLs, functional, and daily tasks with chronic B knee pain   Comorbidities: Musculoskeletal disorders, Social determinants of health: Depression, and Other: arthritis, latex allergy, HTN, thyroid problems, right knee arthroscopy , lumbar discectomy, overactive bladder, B hip pain      Assessment / key information:    Pt is a 74 year old female who presents to therapy today with B knee pain. Pt states that her symptoms are chronic in nature. She reports no new injury but does report having a hx of falls when her left foot drags (possibly due to the left knee before more painful than the right). She has pain with prolonged sitting, ambulation, sleeping, and stair negotiation. Pt demonstrated decreased AROM, decreased strength, muscle tightness, tenderness to palpation, and impaired gait. Increased Q angle noted in supine and in standing on the left>right knee. Pt would benefit from skilled aquatic physical therapy to improve the above impairments to help the pt restore function and return to performing ADLs, functional and daily activities.     Evaluation Complexity:  History:  HIGH Complexity :3+ comorbidities / personal factors will impact the

## 2024-12-16 NOTE — PROGRESS NOTES
Patient: Felipa Toledo                MRN: [id number]       SSN: [id number]  YOB: 1950        AGE: 74 y.o.        SEX: female  Body mass index is 28.51 kg/m².    PCP: Nathalia Reyes DNP  12/19/24    Chief Complaint   Patient presents with    Follow-up    Knee Pain     ORTHOVISC #3 BILATERAL KNEE     HISTORY:  Felipa Toledo is a 74 y.o. female who is seen for bilateral knee pain. She presents today for her third injection in the Orthovisc visco supplementation series.      ICD-10-CM    1. Unilateral primary osteoarthritis, right knee  M17.11 DRAIN/INJECT LARGE JOINT/BURSA     hyaluronan (ORTHOVISC) 30 MG/2ML injection 30 mg      2. Unilateral primary osteoarthritis, left knee  M17.12 DRAIN/INJECT LARGE JOINT/BURSA     hyaluronan (ORTHOVISC) 30 MG/2ML injection 30 mg         PROCEDURE:  Ms. Horowitz knees injected with 2 cc of Orthovisc.     Chart reviewed for the following:   Darron HARDY MD, have reviewed the History, Physical and updated the Allergic reactions for Felipa Toledo     TIME OUT performed immediately prior to start of procedure:  Darron HARDY MD, have performed the following reviews on Felipa Toledo prior to the start of the procedure:            * Patient was identified by name and date of birth   * Agreement on procedure being performed was verified  * Risks and Benefits explained to the patient  * Procedure site verified and marked as necessary  * Patient was positioned for comfort  * Consent was obtained     Time: 11:09 AM     Date of procedure: 12/19/2024    Procedure performed by:  Darron Tovar MD    Ms. Toledo tolerated the procedure well with no complications.    PLAN: Ms. Horowitz knees injected with 2 cc of Orthovisc. Ms. Toledo will follow up in one week to complete her visco supplementation injection series.    Documentation by bon Bella, as documented by Darron Tovar MD.

## 2024-12-17 ENCOUNTER — LAB (OUTPATIENT)
Facility: CLINIC | Age: 74
End: 2024-12-17

## 2024-12-17 DIAGNOSIS — E03.9 ACQUIRED HYPOTHYROIDISM: ICD-10-CM

## 2024-12-18 DIAGNOSIS — E03.9 ACQUIRED HYPOTHYROIDISM: ICD-10-CM

## 2024-12-18 LAB — TSH SERPL DL<=0.005 MIU/L-ACNC: 0.77 UIU/ML (ref 0.45–4.5)

## 2024-12-18 RX ORDER — LEVOTHYROXINE SODIUM 88 UG/1
88 TABLET ORAL DAILY
Qty: 90 TABLET | Refills: 1 | Status: SHIPPED | OUTPATIENT
Start: 2024-12-18

## 2024-12-19 ENCOUNTER — OFFICE VISIT (OUTPATIENT)
Age: 74
End: 2024-12-19

## 2024-12-19 VITALS — BODY MASS INDEX: 28.56 KG/M2 | HEIGHT: 67 IN | WEIGHT: 182 LBS | TEMPERATURE: 97.1 F

## 2024-12-19 DIAGNOSIS — M17.11 UNILATERAL PRIMARY OSTEOARTHRITIS, RIGHT KNEE: Primary | ICD-10-CM

## 2024-12-19 DIAGNOSIS — M17.12 UNILATERAL PRIMARY OSTEOARTHRITIS, LEFT KNEE: ICD-10-CM

## 2024-12-20 DIAGNOSIS — E03.9 ACQUIRED HYPOTHYROIDISM: ICD-10-CM

## 2024-12-20 RX ORDER — LEVOTHYROXINE SODIUM 88 UG/1
88 TABLET ORAL DAILY
Qty: 90 TABLET | Refills: 1 | OUTPATIENT
Start: 2024-12-20

## 2024-12-23 NOTE — PROGRESS NOTES
Patient: Felipa Toledo                MRN: [id number]       SSN: [id number]  YOB: 1950        AGE: 74 y.o.        SEX: female  There is no height or weight on file to calculate BMI.    PCP: Nathalia Reyes DNP  12/26/24    Chief Complaint   Patient presents with    Knee Pain     bilateral     HISTORY:  Felipa Toledo is a 74 y.o. female who is seen for bilateral knee pain. She presents today for her fourth injection in the Orthovisc visco supplementation series.    PROCEDURE:  Ms. Horowitz knees injected with 2 cc of Orthovisc.     TIME OUT performed immediately prior to start of procedure:  Darron HARDY MD, have performed the following reviews on Felipa Toledo prior to the start of the procedure:            * Patient was identified by name and date of birth   * Agreement on procedure being performed was verified  * Risks and Benefits explained to the patient  * Procedure site verified and marked as necessary  * Patient was positioned for comfort  * Consent was obtained     Time: 11:00 AM     Date of procedure: 12/26/2024    Procedure performed by:  Darron Tovar MD    Ms. Toledo tolerated the procedure well with no complications      ICD-10-CM    1. Unilateral primary osteoarthritis, right knee  M17.11 DRAIN/INJECT LARGE JOINT/BURSA     hyaluronan (ORTHOVISC) 30 MG/2ML injection 30 mg      2. Unilateral primary osteoarthritis, left knee  M17.12 DRAIN/INJECT LARGE JOINT/BURSA     hyaluronan (ORTHOVISC) 30 MG/2ML injection 30 mg        PLAN: Ms. Horowitz knees injected with 2 cc of Orthovisc. Ms. Toledo will follow up PRN now that she has completed her visco supplementation injection series.     Documentation by bon Bella, as documented by Darron Tovar MD.

## 2024-12-26 ENCOUNTER — OFFICE VISIT (OUTPATIENT)
Age: 74
End: 2024-12-26

## 2024-12-26 DIAGNOSIS — M17.12 UNILATERAL PRIMARY OSTEOARTHRITIS, LEFT KNEE: ICD-10-CM

## 2024-12-26 DIAGNOSIS — M17.11 UNILATERAL PRIMARY OSTEOARTHRITIS, RIGHT KNEE: Primary | ICD-10-CM

## 2024-12-30 ENCOUNTER — APPOINTMENT (OUTPATIENT)
Facility: HOSPITAL | Age: 74
End: 2024-12-30
Payer: MEDICARE

## 2025-01-29 ENCOUNTER — TELEPHONE (OUTPATIENT)
Facility: CLINIC | Age: 75
End: 2025-01-29

## 2025-01-29 ENCOUNTER — PATIENT MESSAGE (OUTPATIENT)
Facility: CLINIC | Age: 75
End: 2025-01-29

## 2025-01-29 NOTE — TELEPHONE ENCOUNTER
Patient notified that provider will not be in the office. She stated that that was fine and she will wait.

## 2025-02-26 ENCOUNTER — PATIENT MESSAGE (OUTPATIENT)
Facility: CLINIC | Age: 75
End: 2025-02-26

## 2025-02-26 NOTE — TELEPHONE ENCOUNTER
Hello, your BMI is just slightly above normal at 28.     I like to recommend you follow the diabetes diet as this is a balanced diet to help reduce carbs and sweets in your diet and increase fruits and veggies. Diet goes a long with with weight loss. Go to the American Diabetes Association website and take a look around.     Also, exercise is the 2nd most important aspect for weight loss. I understand you are limited due to knee pain but some exercise is most definitely better than none.      When is your knee surgery?

## 2025-03-03 DIAGNOSIS — E78.2 MIXED HYPERLIPIDEMIA: ICD-10-CM

## 2025-03-03 DIAGNOSIS — E55.9 VITAMIN D DEFICIENCY: ICD-10-CM

## 2025-03-03 DIAGNOSIS — R73.03 PREDIABETES: ICD-10-CM

## 2025-03-03 DIAGNOSIS — I10 PRIMARY HYPERTENSION: Primary | ICD-10-CM

## 2025-03-03 DIAGNOSIS — E03.9 ACQUIRED HYPOTHYROIDISM: ICD-10-CM

## 2025-03-13 SDOH — HEALTH STABILITY: PHYSICAL HEALTH: ON AVERAGE, HOW MANY MINUTES DO YOU ENGAGE IN EXERCISE AT THIS LEVEL?: 0 MIN

## 2025-03-13 SDOH — HEALTH STABILITY: PHYSICAL HEALTH: ON AVERAGE, HOW MANY DAYS PER WEEK DO YOU ENGAGE IN MODERATE TO STRENUOUS EXERCISE (LIKE A BRISK WALK)?: 0 DAYS

## 2025-03-14 ENCOUNTER — HOSPITAL ENCOUNTER (OUTPATIENT)
Facility: HOSPITAL | Age: 75
Setting detail: SPECIMEN
Discharge: HOME | End: 2025-03-17

## 2025-03-14 ENCOUNTER — HOSPITAL ENCOUNTER (OUTPATIENT)
Facility: HOSPITAL | Age: 75
Discharge: HOME | End: 2025-03-17
Payer: MEDICARE

## 2025-03-14 ENCOUNTER — OFFICE VISIT (OUTPATIENT)
Age: 75
End: 2025-03-14
Payer: MEDICARE

## 2025-03-14 VITALS — HEIGHT: 66 IN | WEIGHT: 185 LBS | BODY MASS INDEX: 29.73 KG/M2

## 2025-03-14 DIAGNOSIS — M17.0 BILATERAL PRIMARY OSTEOARTHRITIS OF KNEE: ICD-10-CM

## 2025-03-14 DIAGNOSIS — M17.0 BILATERAL PRIMARY OSTEOARTHRITIS OF KNEE: Primary | ICD-10-CM

## 2025-03-14 DIAGNOSIS — I10 HYPERTENSION, UNSPECIFIED TYPE: ICD-10-CM

## 2025-03-14 DIAGNOSIS — E78.00 HIGH CHOLESTEROL: ICD-10-CM

## 2025-03-14 LAB
EKG ATRIAL RATE: 60 BPM
EKG DIAGNOSIS: NORMAL
EKG P AXIS: 21 DEGREES
EKG P-R INTERVAL: 180 MS
EKG Q-T INTERVAL: 436 MS
EKG QRS DURATION: 108 MS
EKG QTC CALCULATION (BAZETT): 436 MS
EKG R AXIS: -26 DEGREES
EKG T AXIS: 3 DEGREES
EKG VENTRICULAR RATE: 60 BPM
LABCORP SPECIMEN COLLECTION: NORMAL
LABCORP SPECIMEN COLLECTION: NORMAL

## 2025-03-14 PROCEDURE — 93005 ELECTROCARDIOGRAM TRACING: CPT

## 2025-03-14 PROCEDURE — 1123F ACP DISCUSS/DSCN MKR DOCD: CPT | Performed by: ORTHOPAEDIC SURGERY

## 2025-03-14 PROCEDURE — 99001 SPECIMEN HANDLING PT-LAB: CPT

## 2025-03-14 PROCEDURE — 93010 ELECTROCARDIOGRAM REPORT: CPT | Performed by: INTERNAL MEDICINE

## 2025-03-14 PROCEDURE — 71046 X-RAY EXAM CHEST 2 VIEWS: CPT

## 2025-03-14 PROCEDURE — 99204 OFFICE O/P NEW MOD 45 MIN: CPT | Performed by: ORTHOPAEDIC SURGERY

## 2025-03-14 NOTE — PATIENT INSTRUCTIONS
you start to have pain, rest your knee until your pain gets back to the level that is normal for you. Or cycle for less time or with less effort.  Follow-up care is a key part of your treatment and safety. Be sure to make and go to all appointments, and call your doctor if you are having problems. It's also a good idea to know your test results and keep a list of the medicines you take.  Current as of: July 18, 2023               Content Version: 13.9  © 2006-2023 [employer].   Care instructions adapted under license by [employer]. If you have questions about a medical condition or this instruction, always ask your healthcare professional. [employer] disclaims any warranty or liability for your use of this information.

## 2025-03-14 NOTE — PROGRESS NOTES
Jame at least grade 4    Right Knee -Decrease range of motion with flexion, Some crepitation, Grossly neurovascularly intact, Good cap refill, No skin lesion, Moderate swelling, some gross instability, Some quadriceps weaknessKellgren and Jame at least grade 4    Patient has failed conservative treatments including cortisone injections & home exercise programs for 6 weeks or greater. Patient is not able to walk distances longer than 15 minutes for over 3 months now. Patient is also unable to do any squatting or kneeling which has impacted daily living activities.Due to the severe nature of patients osteoarthritis and limited knee function the patient is unable to complete a formal physical therapy program due to pain severity, this also would have no benefit for the patient with grade 4 Kellgren-Jame.    Of note: This encounter is not finalizing the scheduling of any type of surgery.  Rather further diagnostic workup and clearances/ancillary workup will be required prior to scheduling the surgery.  All those documents will be reviewed and then a final decision on a follow-up appointment will be made regarding proceeding with surgery.  Patient has been made aware.    Inpatient status: The patient has admitted to severe pain in the affected knee and due to such pain they are unable to complete activities of daily living at home and/or work on a regular basis where conservative treatments have failed. After extensive discussion with the patient, they have chosen to receive a total knee replacement with the expectation of inpatient procedure. Their dependent functional status (i.e. lack of capable support and safety at home, pain management, comorbities, or difficulty ambulating with assistive walking devices) would deem them a candidate for an inpatient stay. The patient acknowledges and understand the plan.    The risks of surgery were explained to the patient which include but not limited to infection,

## 2025-03-15 LAB
25(OH)D3+25(OH)D2 SERPL-MCNC: 70.2 NG/ML (ref 30–100)
ALBUMIN SERPL-MCNC: 4.2 G/DL (ref 3.8–4.8)
ALP SERPL-CCNC: 85 IU/L (ref 44–121)
ALT SERPL-CCNC: 8 IU/L (ref 0–32)
AST SERPL-CCNC: 15 IU/L (ref 0–40)
BASOPHILS # BLD AUTO: 0.1 X10E3/UL (ref 0–0.2)
BASOPHILS NFR BLD AUTO: 2 %
BILIRUB SERPL-MCNC: 0.5 MG/DL (ref 0–1.2)
BUN SERPL-MCNC: 9 MG/DL (ref 8–27)
BUN/CREAT SERPL: 11 (ref 12–28)
CALCIUM SERPL-MCNC: 9.4 MG/DL (ref 8.7–10.3)
CHLORIDE SERPL-SCNC: 105 MMOL/L (ref 96–106)
CHOLEST SERPL-MCNC: 185 MG/DL (ref 100–199)
CO2 SERPL-SCNC: 24 MMOL/L (ref 20–29)
CREAT SERPL-MCNC: 0.82 MG/DL (ref 0.57–1)
EGFRCR SERPLBLD CKD-EPI 2021: 74 ML/MIN/1.73
EOSINOPHIL # BLD AUTO: 0.2 X10E3/UL (ref 0–0.4)
EOSINOPHIL NFR BLD AUTO: 3 %
ERYTHROCYTE [DISTWIDTH] IN BLOOD BY AUTOMATED COUNT: 12.5 % (ref 11.7–15.4)
GLOBULIN SER CALC-MCNC: 2.7 G/DL (ref 1.5–4.5)
GLUCOSE SERPL-MCNC: 100 MG/DL (ref 70–99)
HBA1C MFR BLD: 6.1 % (ref 4.8–5.6)
HCT VFR BLD AUTO: 40.9 % (ref 34–46.6)
HDLC SERPL-MCNC: 50 MG/DL
HGB BLD-MCNC: 13.1 G/DL (ref 11.1–15.9)
IMM GRANULOCYTES # BLD AUTO: 0 X10E3/UL (ref 0–0.1)
IMM GRANULOCYTES NFR BLD AUTO: 0 %
LDLC SERPL CALC-MCNC: 117 MG/DL (ref 0–99)
LYMPHOCYTES # BLD AUTO: 1.8 X10E3/UL (ref 0.7–3.1)
LYMPHOCYTES NFR BLD AUTO: 26 %
MCH RBC QN AUTO: 28.8 PG (ref 26.6–33)
MCHC RBC AUTO-ENTMCNC: 32 G/DL (ref 31.5–35.7)
MCV RBC AUTO: 90 FL (ref 79–97)
MONOCYTES # BLD AUTO: 0.9 X10E3/UL (ref 0.1–0.9)
MONOCYTES NFR BLD AUTO: 13 %
NEUTROPHILS # BLD AUTO: 3.9 X10E3/UL (ref 1.4–7)
NEUTROPHILS NFR BLD AUTO: 56 %
PLATELET # BLD AUTO: 386 X10E3/UL (ref 150–450)
POTASSIUM SERPL-SCNC: 4.3 MMOL/L (ref 3.5–5.2)
PROT SERPL-MCNC: 6.9 G/DL (ref 6–8.5)
RBC # BLD AUTO: 4.55 X10E6/UL (ref 3.77–5.28)
SODIUM SERPL-SCNC: 143 MMOL/L (ref 134–144)
TRIGL SERPL-MCNC: 99 MG/DL (ref 0–149)
TSH SERPL DL<=0.005 MIU/L-ACNC: 1.18 UIU/ML (ref 0.45–4.5)
VLDLC SERPL CALC-MCNC: 18 MG/DL (ref 5–40)
WBC # BLD AUTO: 6.9 X10E3/UL (ref 3.4–10.8)

## 2025-03-16 LAB — MRSA SPEC QL CULT: NEGATIVE

## 2025-03-17 ENCOUNTER — PATIENT MESSAGE (OUTPATIENT)
Facility: CLINIC | Age: 75
End: 2025-03-17

## 2025-03-25 ENCOUNTER — TELEMEDICINE (OUTPATIENT)
Facility: CLINIC | Age: 75
End: 2025-03-25
Payer: MEDICARE

## 2025-03-25 DIAGNOSIS — J30.89 ENVIRONMENTAL AND SEASONAL ALLERGIES: Primary | ICD-10-CM

## 2025-03-25 PROCEDURE — 1123F ACP DISCUSS/DSCN MKR DOCD: CPT | Performed by: NURSE PRACTITIONER

## 2025-03-25 PROCEDURE — 99214 OFFICE O/P EST MOD 30 MIN: CPT | Performed by: NURSE PRACTITIONER

## 2025-03-25 RX ORDER — DEXTROMETHORPHAN POLISTIREX 30 MG/5ML
60 SUSPENSION ORAL 2 TIMES DAILY PRN
COMMUNITY
Start: 2025-03-25 | End: 2025-04-04

## 2025-03-25 RX ORDER — AZELASTINE 1 MG/ML
1 SPRAY, METERED NASAL 2 TIMES DAILY
Qty: 30 ML | Refills: 0 | Status: SHIPPED | OUTPATIENT
Start: 2025-03-25

## 2025-03-25 NOTE — ASSESSMENT & PLAN NOTE
- Symptoms have persisted for six days, with clear or whitish sputum and occasional clear nasal discharge.  - No fever, body aches, or new symptoms reported; low-grade fever of 99.9°F noted yesterday.  - Discussion included the prevalence of influenza A and the allergic nature of symptoms due to clear discharge.  - Prescribed Astelin spray, one spray in each nostril twice daily for 7-10 days, advised to continue Zyrtec, start Delsym cough syrup (no food or drink for 20 minutes post-administration), and use Mucinex (without DM) for mucus clearance. Advised to stay hydrated and avoid dairy products.    4/3/2024:  Pollen is falling pretty heavy  Patient has not been taking her allergy medication  Strongly recommend she take medication especially when the pollen is high and she is outside in the yard working.

## 2025-03-25 NOTE — PROGRESS NOTES
(PRILOSEC) 20 MG delayed release capsule Take 1 capsule by mouth every morning (before breakfast) For reflux and gut protection from NSAID therapy    simvastatin (ZOCOR) 40 MG tablet Take 1 tablet by mouth nightly For cholesterol    vitamin D (ERGOCALCIFEROL) 1.25 MG (26344 UT) CAPS capsule Take 1 capsule by mouth once a week    calcium carbonate (OSCAL) 500 MG TABS tablet Take 1 tablet by mouth 2 times daily    cetirizine (ZYRTEC) 10 MG tablet Take 1 tablet by mouth daily as needed for Allergies For allergies    diclofenac sodium (VOLTAREN) 1 % GEL Apply 2 g topically 4 times daily Right knee     No current facility-administered medications for this visit.       Past Surgical History:   Procedure Laterality Date    CERVICAL DISCECTOMY  6/7/2016    COLONOSCOPY  approximately 2013    HYSTERECTOMY (CERVIX STATUS UNKNOWN)  approximately 1985    LUMBAR DISCECTOMY  2016    ORTHOPEDIC SURGERY  2006    right knee surgery    PLASTIC SURGERY, NECK      US BIOPSY OF SALIVARY GLAND  7/31/2023    US BIOPSY OF SALIVARY GLAND 7/31/2023 [facility] RAD US     Allergies and Intolerances:   Allergies   Allergen Reactions    Other Anaphylaxis     Pt has intermstim to Right upper buttocks    Adhesive Tape Hives     Family History:   Family History   Problem Relation Age of Onset    Hypertension Mother     Hypertension Father     Heart Disease Father 58    Alzheimer's Disease Father     Other Other         cerebral aneurysm     Social History:   She  reports that she has never smoked. She has never been exposed to tobacco smoke. She has never used smokeless tobacco.   Social History     Substance and Sexual Activity   Alcohol Use No     Immunization History:  Immunization History   Administered Date(s) Administered    COVID-19, PFIZER PURPLE top, DILUTE for use, (age 12 y+), 30mcg/0.3mL 03/06/2021, 03/21/2021    Pneumococcal, PCV-13, PREVNAR 13, (age 6w+), IM, 0.5mL 08/10/2018    Pneumococcal, PPSV23, PNEUMOVAX 23, (age 2y+), SC/IM, 0.5mL

## 2025-03-31 ENCOUNTER — PATIENT MESSAGE (OUTPATIENT)
Facility: CLINIC | Age: 75
End: 2025-03-31

## 2025-03-31 DIAGNOSIS — J06.9 UPPER RESPIRATORY TRACT INFECTION, UNSPECIFIED TYPE: Primary | ICD-10-CM

## 2025-03-31 RX ORDER — PREDNISONE 20 MG/1
20 TABLET ORAL DAILY
Qty: 5 TABLET | Refills: 0 | Status: SHIPPED | OUTPATIENT
Start: 2025-03-31 | End: 2025-04-05

## 2025-03-31 RX ORDER — PREDNISONE 20 MG/1
20 TABLET ORAL DAILY
Qty: 5 TABLET | Refills: 0 | Status: SHIPPED | OUTPATIENT
Start: 2025-03-31 | End: 2025-03-31 | Stop reason: SDUPTHER

## 2025-03-31 NOTE — TELEPHONE ENCOUNTER
Please call patient and inquire if she is coughing up any green-yellow sputum or blowing green-yellow from her nose.  Also noted she had a fever along.  Thank you

## 2025-03-31 NOTE — TELEPHONE ENCOUNTER
Spoke w/ pt to inform. Pt would like to know if medication can be sent to [facility] [address] in [place].

## 2025-03-31 NOTE — TELEPHONE ENCOUNTER
Sounds like she may have some inflammation in her chest.  I have sent in prednisone 20 mg to take 1 daily for the next 5 days.  She can continue to take Delsym to help with her cough.  Thank you     Diagnosis Orders   1. Upper respiratory tract infection, unspecified type  predniSONE (DELTASONE) 20 MG tablet

## 2025-04-02 ENCOUNTER — OFFICE VISIT (OUTPATIENT)
Facility: CLINIC | Age: 75
End: 2025-04-02
Payer: MEDICARE

## 2025-04-02 VITALS
OXYGEN SATURATION: 99 % | RESPIRATION RATE: 18 BRPM | TEMPERATURE: 98.2 F | HEART RATE: 93 BPM | DIASTOLIC BLOOD PRESSURE: 74 MMHG | HEIGHT: 66 IN | SYSTOLIC BLOOD PRESSURE: 142 MMHG | WEIGHT: 186 LBS | BODY MASS INDEX: 29.89 KG/M2

## 2025-04-02 DIAGNOSIS — M17.12 PRIMARY OSTEOARTHRITIS OF LEFT KNEE: ICD-10-CM

## 2025-04-02 DIAGNOSIS — Z01.818 PREOPERATIVE CLEARANCE: Primary | ICD-10-CM

## 2025-04-02 DIAGNOSIS — J06.9 UPPER RESPIRATORY TRACT INFECTION, UNSPECIFIED TYPE: ICD-10-CM

## 2025-04-02 DIAGNOSIS — G47.33 OSA (OBSTRUCTIVE SLEEP APNEA): ICD-10-CM

## 2025-04-02 PROCEDURE — 99214 OFFICE O/P EST MOD 30 MIN: CPT | Performed by: NURSE PRACTITIONER

## 2025-04-02 PROCEDURE — 3078F DIAST BP <80 MM HG: CPT | Performed by: NURSE PRACTITIONER

## 2025-04-02 PROCEDURE — 3077F SYST BP >= 140 MM HG: CPT | Performed by: NURSE PRACTITIONER

## 2025-04-02 PROCEDURE — 1123F ACP DISCUSS/DSCN MKR DOCD: CPT | Performed by: NURSE PRACTITIONER

## 2025-04-02 RX ORDER — DICLOFENAC SODIUM 75 MG/1
75 TABLET, DELAYED RELEASE ORAL DAILY PRN
COMMUNITY
End: 2025-04-08 | Stop reason: SDUPTHER

## 2025-04-02 SDOH — ECONOMIC STABILITY: FOOD INSECURITY: WITHIN THE PAST 12 MONTHS, YOU WORRIED THAT YOUR FOOD WOULD RUN OUT BEFORE YOU GOT MONEY TO BUY MORE.: NEVER TRUE

## 2025-04-02 SDOH — ECONOMIC STABILITY: FOOD INSECURITY: WITHIN THE PAST 12 MONTHS, THE FOOD YOU BOUGHT JUST DIDN'T LAST AND YOU DIDN'T HAVE MONEY TO GET MORE.: NEVER TRUE

## 2025-04-02 ASSESSMENT — PATIENT HEALTH QUESTIONNAIRE - PHQ9: DEPRESSION UNABLE TO ASSESS: PT REFUSES

## 2025-04-02 NOTE — PROGRESS NOTES
[facility] [address]  [address]  [place] [id number]  [phone] office/[phone] fax    4/2/2025    Felipa Toledo 1950 is a pleasant White (non-) female.       History of Present Illness  The patient presents for a preoperative evaluation for left total knee arthroplasty, cough, hair loss, and weight management.    A left total knee arthroplasty is scheduled under the care of Dr. RAFAEL Francisco, although the exact date remains uncertain. Laboratory tests and three different urinary tract tests with Dr. Ramirez at [facility] have been completed. No history of coronary revascularizations within the past 5 years, recurrent chest pain, or shortness of breath outside of the current illness is reported. No stress test has been undergone, but an EKG was normal. No history of myocardial infarction, congestive heart failure, valvular disease, AV blocks, or arrhythmias is reported.  Left knee pain is currently absent, but without medication, it would be rated as 7 or 8 on a scale of 0 to 10. Inactivity due to illness has resulted in spending half of the time in bed. Diclofenac was previously taken, discontinued, and replaced with Celebrex. Due to persistent pain, diclofenac was resumed, providing significant relief. Diclofenac 75 mg is currently taken once daily. Celebrex dc'd.    Other Risk Factors:  1. Diabetes hx:  yes Prediabetic Controlled: Yes   2. H/o CVA:  no  3. Uncontrolled hypertension:  no  4, Advanced age:  yes  5. Low functional capacity:  no    A persistent cough has been experienced, which has shown significant improvement today. The cough is described as \"deep, deep down, coughing and histamine and gurgling.\" Clear sputum production and throat discomfort are reported. No current medication is being taken for the cough, as Delsym has run out. Improvement is noted, as no coughing occurred last night. No nausea or vomiting is reported. Mucinex was tried without

## 2025-04-02 NOTE — PROGRESS NOTES
Felipa Toledo is a 75 y.o. female (: 1950) presenting to address:    Chief Complaint   Patient presents with    Pre-op Exam     Left total knee replacement w/ Dr. Rudy Francisco        Vitals:    25 1438   BP: (!) 149/77   Pulse: 93   Resp: 18   Temp: 98.2 °F (36.8 °C)   SpO2: 99%       \"Have you been to the ER, urgent care clinic since your last visit?  Hospitalized since your last visit?\"    NO    “Have you seen or consulted any other health care providers outside of [facility] since your last visit?”    NO

## 2025-04-07 PROBLEM — M17.12 OSTEOARTHRITIS OF LEFT KNEE: Status: ACTIVE | Noted: 2025-04-07

## 2025-04-07 NOTE — ASSESSMENT & PLAN NOTE
- Significant pain reported, elective surgery with Dr. ANA Francisco, Ortho planned.  - Physical exam findings: pain managed with diclofenac 75 mg once a day.  - Discussion: Celebrex previously tried but ineffective, patient prefers diclofenac.  - Treatment: Continue diclofenac 75 mg once a day, follow up with Dr. ANA Francisco for surgery scheduling.

## 2025-04-07 NOTE — ASSESSMENT & PLAN NOTE
4/2/2025:  Started pred therapy 4/1/2025.  Improvement noted.    - Clear sputum production suggests allergies; bronchospasms may be contributing.  - Physical exam findings: clear sputum, no signs of infection.  - Discussion: Prednisone prescribed to calm the lungs, patient has been on it for one day with noted improvement.  - Treatment: Continue prednisone regimen.    3/31/2025:  Sounds like she may have some inflammation in her chest.  I have sent in prednisone 20 mg to take 1 daily for the next 5 days.  She can continue to take Delsym to help with her cough.  Thank you

## 2025-04-07 NOTE — ASSESSMENT & PLAN NOTE
A left total knee arthroplasty is scheduled under the care of Dr. RAFAEL Francisco, although the exact date remains uncertain.    Labs   - MRSA culture: Negative   - CBC: Normal   - CMP: Normal   - EGFR: 74   - Creatinine: 0.82    Diagnostic Testing   - EKG: Normal   - CXR: was negative for infiltrate, effusion, pneumothorax, or wide mediastinum 3/14/2025    Of note: A history of sleep apnea is noted but no longer requires treatment after losing weight from over 200 pounds to 170 pounds.    Labs and imaging within acceptable range. No contraindications to planned surgery. Discontinue NSAIDS 1 week prior to surgical procedure. Follow up with surgeon as scheduled post operatively.

## 2025-04-08 ENCOUNTER — OFFICE VISIT (OUTPATIENT)
Facility: CLINIC | Age: 75
End: 2025-04-08
Payer: MEDICARE

## 2025-04-08 VITALS
SYSTOLIC BLOOD PRESSURE: 132 MMHG | RESPIRATION RATE: 16 BRPM | DIASTOLIC BLOOD PRESSURE: 72 MMHG | HEART RATE: 79 BPM | WEIGHT: 184 LBS | HEIGHT: 66 IN | OXYGEN SATURATION: 97 % | BODY MASS INDEX: 29.57 KG/M2 | TEMPERATURE: 98.3 F

## 2025-04-08 DIAGNOSIS — E03.9 ACQUIRED HYPOTHYROIDISM: ICD-10-CM

## 2025-04-08 DIAGNOSIS — E55.9 VITAMIN D DEFICIENCY: ICD-10-CM

## 2025-04-08 DIAGNOSIS — J30.89 ENVIRONMENTAL AND SEASONAL ALLERGIES: ICD-10-CM

## 2025-04-08 DIAGNOSIS — N28.89 RIGHT RENAL MASS: ICD-10-CM

## 2025-04-08 DIAGNOSIS — G47.33 OSA (OBSTRUCTIVE SLEEP APNEA): ICD-10-CM

## 2025-04-08 DIAGNOSIS — R73.03 PREDIABETES: ICD-10-CM

## 2025-04-08 DIAGNOSIS — M85.80 OSTEOPENIA AFTER MENOPAUSE: ICD-10-CM

## 2025-04-08 DIAGNOSIS — M17.12 PRIMARY OSTEOARTHRITIS OF LEFT KNEE: ICD-10-CM

## 2025-04-08 DIAGNOSIS — Z78.0 OSTEOPENIA AFTER MENOPAUSE: ICD-10-CM

## 2025-04-08 DIAGNOSIS — Z71.89 ACP (ADVANCE CARE PLANNING): ICD-10-CM

## 2025-04-08 DIAGNOSIS — F32.5 MAJOR DEPRESSIVE DISORDER, SINGLE EPISODE, IN FULL REMISSION: ICD-10-CM

## 2025-04-08 DIAGNOSIS — D11.0 PAROTID PLEOMORPHIC ADENOMA: ICD-10-CM

## 2025-04-08 DIAGNOSIS — M17.0 BILATERAL PRIMARY OSTEOARTHRITIS OF KNEE: ICD-10-CM

## 2025-04-08 DIAGNOSIS — Z00.00 MEDICARE ANNUAL WELLNESS VISIT, SUBSEQUENT: Primary | ICD-10-CM

## 2025-04-08 DIAGNOSIS — E78.2 MIXED HYPERLIPIDEMIA: ICD-10-CM

## 2025-04-08 DIAGNOSIS — N32.81 OAB (OVERACTIVE BLADDER): ICD-10-CM

## 2025-04-08 DIAGNOSIS — K21.9 GASTROESOPHAGEAL REFLUX DISEASE WITHOUT ESOPHAGITIS: ICD-10-CM

## 2025-04-08 DIAGNOSIS — I10 PRIMARY HYPERTENSION: ICD-10-CM

## 2025-04-08 PROCEDURE — 99497 ADVNCD CARE PLAN 30 MIN: CPT | Performed by: NURSE PRACTITIONER

## 2025-04-08 PROCEDURE — 3075F SYST BP GE 130 - 139MM HG: CPT | Performed by: NURSE PRACTITIONER

## 2025-04-08 PROCEDURE — 1123F ACP DISCUSS/DSCN MKR DOCD: CPT | Performed by: NURSE PRACTITIONER

## 2025-04-08 PROCEDURE — G0439 PPPS, SUBSEQ VISIT: HCPCS | Performed by: NURSE PRACTITIONER

## 2025-04-08 PROCEDURE — 99214 OFFICE O/P EST MOD 30 MIN: CPT | Performed by: NURSE PRACTITIONER

## 2025-04-08 PROCEDURE — 3078F DIAST BP <80 MM HG: CPT | Performed by: NURSE PRACTITIONER

## 2025-04-08 RX ORDER — SIMVASTATIN 40 MG
40 TABLET ORAL NIGHTLY
Qty: 100 TABLET | Refills: 3 | Status: SHIPPED | OUTPATIENT
Start: 2025-04-08

## 2025-04-08 RX ORDER — OMEPRAZOLE 20 MG/1
20 CAPSULE, DELAYED RELEASE ORAL
Qty: 93 CAPSULE | Refills: 3 | Status: SHIPPED | OUTPATIENT
Start: 2025-04-08

## 2025-04-08 RX ORDER — DICLOFENAC SODIUM 75 MG/1
75 TABLET, DELAYED RELEASE ORAL DAILY PRN
Qty: 90 TABLET | Refills: 3 | Status: SHIPPED | OUTPATIENT
Start: 2025-04-08

## 2025-04-08 RX ORDER — AMLODIPINE BESYLATE 10 MG/1
10 TABLET ORAL DAILY
Qty: 93 TABLET | Refills: 1 | Status: SHIPPED | OUTPATIENT
Start: 2025-04-08

## 2025-04-08 RX ORDER — ERGOCALCIFEROL 1.25 MG/1
50000 CAPSULE, LIQUID FILLED ORAL
Qty: 13 CAPSULE | Refills: 1 | Status: SHIPPED | OUTPATIENT
Start: 2025-04-08

## 2025-04-08 RX ORDER — CALCIUM CARBONATE 500(1250)
500 TABLET ORAL 2 TIMES DAILY
Qty: 60 TABLET | Refills: 11 | Status: SHIPPED | OUTPATIENT
Start: 2025-04-08

## 2025-04-08 RX ORDER — PAROXETINE 40 MG/1
40 TABLET, FILM COATED ORAL DAILY
Qty: 93 TABLET | Refills: 1 | Status: SHIPPED | OUTPATIENT
Start: 2025-04-08

## 2025-04-08 RX ORDER — CETIRIZINE HYDROCHLORIDE 10 MG/1
10 TABLET ORAL DAILY PRN
Qty: 100 TABLET | Refills: 3 | Status: SHIPPED | OUTPATIENT
Start: 2025-04-08

## 2025-04-08 RX ORDER — LEVOTHYROXINE SODIUM 88 UG/1
88 TABLET ORAL DAILY
Qty: 100 TABLET | Refills: 3 | Status: SHIPPED | OUTPATIENT
Start: 2025-04-08

## 2025-04-08 ASSESSMENT — LIFESTYLE VARIABLES
HOW OFTEN DO YOU HAVE A DRINK CONTAINING ALCOHOL: NEVER
HOW MANY STANDARD DRINKS CONTAINING ALCOHOL DO YOU HAVE ON A TYPICAL DAY: PATIENT DOES NOT DRINK

## 2025-04-08 ASSESSMENT — PATIENT HEALTH QUESTIONNAIRE - PHQ9
SUM OF ALL RESPONSES TO PHQ QUESTIONS 1-9: 0
4. FEELING TIRED OR HAVING LITTLE ENERGY: NOT AT ALL
10. IF YOU CHECKED OFF ANY PROBLEMS, HOW DIFFICULT HAVE THESE PROBLEMS MADE IT FOR YOU TO DO YOUR WORK, TAKE CARE OF THINGS AT HOME, OR GET ALONG WITH OTHER PEOPLE: NOT DIFFICULT AT ALL
8. MOVING OR SPEAKING SO SLOWLY THAT OTHER PEOPLE COULD HAVE NOTICED. OR THE OPPOSITE, BEING SO FIGETY OR RESTLESS THAT YOU HAVE BEEN MOVING AROUND A LOT MORE THAN USUAL: NOT AT ALL
1. LITTLE INTEREST OR PLEASURE IN DOING THINGS: NOT AT ALL
SUM OF ALL RESPONSES TO PHQ QUESTIONS 1-9: 0
6. FEELING BAD ABOUT YOURSELF - OR THAT YOU ARE A FAILURE OR HAVE LET YOURSELF OR YOUR FAMILY DOWN: NOT AT ALL
2. FEELING DOWN, DEPRESSED OR HOPELESS: NOT AT ALL
SUM OF ALL RESPONSES TO PHQ QUESTIONS 1-9: 0
7. TROUBLE CONCENTRATING ON THINGS, SUCH AS READING THE NEWSPAPER OR WATCHING TELEVISION: NOT AT ALL
5. POOR APPETITE OR OVEREATING: NOT AT ALL
SUM OF ALL RESPONSES TO PHQ QUESTIONS 1-9: 0
9. THOUGHTS THAT YOU WOULD BE BETTER OFF DEAD, OR OF HURTING YOURSELF: NOT AT ALL
3. TROUBLE FALLING OR STAYING ASLEEP: NOT AT ALL

## 2025-04-08 NOTE — ACP (ADVANCE CARE PLANNING)
Advance Care Planning     Advance Care Planning (ACP) Physician/NP/PA Conversation    Date of Conversation: 4/8/2025  Conducted with: Patient with Decision Making Capacity    Healthcare Decision Maker:      Primary Decision Maker: Chao Toledo - Spouse - [phone]    Click here to complete Healthcare Decision Makers including selection of the Healthcare Decision Maker Relationship (ie \"Primary\")  Today we documented Decision Maker(s) consistent with Legal Next of Kin hierarchy.    Care Preferences:    Hospitalization:  \"If your health worsens and it becomes clear that your chance of recovery is unlikely, what would be your preference regarding hospitalization?\"  The patient would prefer comfort-focused treatment without hospitalization.    Ventilation:  \"If you were unable to breath on your own and your chance of recovery was unlikely, what would be your preference about the use of a ventilator (breathing machine) if it was available to you?\"  The patient would NOT desire the use of a ventilator.    Resuscitation:  \"In the event your heart stopped as a result of an underlying serious health condition, would you want attempts made to restart your heart, or would you prefer a natural death?\"  Yes, attempt to resuscitate.    ventilation preferences, hospitalization preferences, and resuscitation preferences    Conversation Outcomes / Follow-Up Plan:  ACP complete - no further action today  Reviewed DNR/DNI and patient elects Full Code (Attempt Resuscitation)    Length of Voluntary ACP Conversation in minutes:  16 minutes    Nathalia Reyes DNP

## 2025-04-08 NOTE — ACP (ADVANCE CARE PLANNING)
Advance Care Planning     Advance Care Planning (ACP) Physician/NP/PA Conversation    Date of Conversation: 4/8/2025  Conducted with: Patient with Decision Making Capacity    Healthcare Decision Maker:      Primary Decision Maker: Chao Toledo - Spouse - [phone]    Click here to complete Healthcare Decision Makers including selection of the Healthcare Decision Maker Relationship (ie \"Primary\")  Today we documented Decision Maker(s) consistent with Legal Next of Kin hierarchy.    Care Preferences:    Hospitalization:  \"If your health worsens and it becomes clear that your chance of recovery is unlikely, what would be your preference regarding hospitalization?\"  The patient would prefer comfort-focused treatment without hospitalization.    Ventilation:  \"If you were unable to breath on your own and your chance of recovery was unlikely, what would be your preference about the use of a ventilator (breathing machine) if it was available to you?\"  The patient would NOT desire the use of a ventilator.    Resuscitation:  \"In the event your heart stopped as a result of an underlying serious health condition, would you want attempts made to restart your heart, or would you prefer a natural death?\"  Yes, attempt to resuscitate.    ventilation preferences, hospitalization preferences, and resuscitation preferences    Conversation Outcomes / Follow-Up Plan:  ACP in process - completing/providing documents  Reviewed DNR/DNI and patient elects Full Code (Attempt Resuscitation)    Length of Voluntary ACP Conversation in minutes:  16 minutes    Nathalia Reyes DNP

## 2025-04-08 NOTE — PATIENT INSTRUCTIONS
Learning About Being Active as an Older Adult  Why is being active important as you get older?     Being active is one of the best things you can do for your health. And it's never too late to start. Being active--or getting active, if you aren't already--has definite benefits. It can:  Give you more energy,  Keep your mind sharp.  Improve balance to reduce your risk of falls.  Help you manage chronic illness with fewer medicines.  No matter how old you are, how fit you are, or what health problems you have, there is a form of activity that will work for you. And the more physical activity you can do, the better your overall health will be.  What kinds of activity can help you stay healthy?  Being more active will make your daily activities easier. Physical activity includes planned exercise and things you do in daily life. There are four types of activity:  Aerobic.  Doing aerobic activity makes your heart and lungs strong.  Includes walking, dancing, and gardening.  Aim for at least 2½ hours spread throughout the week.  It improves your energy and can help you sleep better.  Muscle-strengthening.  This type of activity can help maintain muscle and strengthen bones.  Includes climbing stairs, using resistance bands, and lifting or carrying heavy loads.  Aim for at least twice a week.  It can help protect the knees and other joints.  Stretching.  Stretching gives you better range of motion in joints and muscles.  Includes upper arm stretches, calf stretches, and gentle yoga.  Aim for at least twice a week, preferably after your muscles are warmed up from other activities.  It can help you function better in daily life.  Balancing.  This helps you stay coordinated and have good posture.  Includes heel-to-toe walking, trice chi, and certain types of yoga.  Aim for at least 3 days a week.  It can reduce your risk of falling.  Even if you have a hard time meeting the recommendations, it's better to be more active

## 2025-04-08 NOTE — PROGRESS NOTES
Felipa Toledo is a 75 y.o. female (: 1950) presenting to address:    Chief Complaint   Patient presents with    Medicare AWV       Vitals:    25 1300   BP: 132/72   Pulse: 79   Resp: 16   Temp: 98.3 °F (36.8 °C)   SpO2: 97%       \"Have you been to the ER, urgent care clinic since your last visit?  Hospitalized since your last visit?\"    NO    “Have you seen or consulted any other health care providers outside of [facility] since your last visit?”    NO             
[facility] [address]  [address]  [place] [address]  [phone] office/[phone] fax    4/8/2025    Felipa Toledo 1950 is a pleasant White (non-) female.       History of Present Illness  The patient presents for AWV and evaluation of allergies, osteopenia, and medication management.    Allergic aymptoms, including coughing, have been experienced, which she attributes to her recent visit to [facility]. These symptoms have been managed with Zyrtec for several years, but her supply has been exhausted. She is seeking a refill of this medication from her new pharmacy, [facility]. Astelin spray is also used as part of her treatment regimen.    She is under the care of Dr. Ramirez, a urogynecologist, for overactive bladder. Three tests have been conducted, and a follow-up appointment is scheduled for 04/18/2025 to discuss the results.    Dr. Francisco, an orthopedic specialist, is managing her right knee pain. Clearance for surgery on her left knee is awaited.     A right parotid lesion has been advised by Dr. Bobo to remain untreated.     Vaccinations for shingles, tetanus, and RSV have not been received. An eye exam was conducted about a year ago, resulting in new glasses.     Omeprazole is taken for reflux, and Synthroid is used for thyroid management. Paxil 40 mg is also taken. Vitamin D is taken once a week, with about 8 doses remaining. Diclofenac is used as needed for pain management, and a refill is required. The need to discontinue this medication one week prior to surgery is acknowledged.    Advance care planning was discussed. She prefers to be at home with hospice-type care if recovery is unlikely. She does not want a ventilator if unable to breathe on her own with an unlikely chance of recovery. CPR is desired if her heart stops due to an underlying serious health condition.      Physical Exam      Physical Exam  Constitutional:       Appearance: Normal 
14    Patient is low risk for opioid use disorder or overdose.    Last PDMP Gregory as Reviewed:  Review User Review Instant Review Result                 Inactivity:  On average, how many days per week do you engage in moderate to strenuous exercise (like a brisk walk)?: 0 days (!) Abnormal  On average, how many minutes do you engage in exercise at this level?: 0 min  Interventions:  See AVS for additional education material                         Objective   Vitals:    04/08/25 1300   BP: 132/72   BP Site: Left Upper Arm   Patient Position: Sitting   BP Cuff Size: Large Adult   Pulse: 79   Resp: 16   Temp: 98.3 °F (36.8 °C)   TempSrc: Temporal   SpO2: 97%   Weight: 83.5 kg (184 lb)   Height: 1.676 m (5' 6\")      Body mass index is 29.7 kg/m².                    Allergies   Allergen Reactions    Other Anaphylaxis     Pt has intermstim to Right upper buttocks    Adhesive Tape Hives     Prior to Visit Medications    Medication Sig Taking? Authorizing Provider   amLODIPine (NORVASC) 10 MG tablet Take 1 tablet by mouth daily For hypertension Yes Nathalia Reyes DNP   calcium carbonate (OSCAL) 500 MG TABS tablet Take 1 tablet by mouth 2 times daily Yes Nathalia Reyes DNP   cetirizine (ZYRTEC) 10 MG tablet Take 1 tablet by mouth daily as needed for Allergies For allergies Yes Nathalia Reyes DNP   levothyroxine (SYNTHROID) 88 MCG tablet Take 1 tablet by mouth daily Yes Nathalia Reyes DNP   omeprazole (PRILOSEC) 20 MG delayed release capsule Take 1 capsule by mouth every morning (before breakfast) For reflux and gut protection from NSAID therapy Yes Nathalia Reyes DNP   PARoxetine (PAXIL) 40 MG tablet Take 1 tablet by mouth daily Yes Nathalia Reyes DNP   simvastatin (ZOCOR) 40 MG tablet Take 1 tablet by mouth nightly For cholesterol Yes Nathalia Reyes DNP   vitamin D (ERGOCALCIFEROL) 1.25 MG (55695 UT) CAPS capsule Take 1 capsule by mouth every 14 days Yes Nathalia Reyes DNP

## 2025-04-10 DIAGNOSIS — Z96.652 STATUS POST TOTAL LEFT KNEE REPLACEMENT: Primary | ICD-10-CM

## 2025-04-11 ENCOUNTER — OFFICE VISIT (OUTPATIENT)
Age: 75
End: 2025-04-11

## 2025-04-11 DIAGNOSIS — M17.12 OSTEOARTHRITIS OF LEFT KNEE, UNSPECIFIED OSTEOARTHRITIS TYPE: Primary | ICD-10-CM

## 2025-04-11 DIAGNOSIS — I10 PRIMARY HYPERTENSION: ICD-10-CM

## 2025-04-11 RX ORDER — CEPHALEXIN 500 MG/1
500 CAPSULE ORAL EVERY 8 HOURS
Qty: 21 CAPSULE | Refills: 0 | Status: SHIPPED | OUTPATIENT
Start: 2025-04-11 | End: 2025-04-18

## 2025-04-11 RX ORDER — ASPIRIN 325 MG
325 TABLET ORAL 2 TIMES DAILY
Qty: 60 TABLET | Refills: 0 | Status: SHIPPED | OUTPATIENT
Start: 2025-04-11

## 2025-04-11 RX ORDER — ONDANSETRON 8 MG/1
TABLET, ORALLY DISINTEGRATING ORAL
Qty: 20 TABLET | Refills: 0 | Status: SHIPPED | OUTPATIENT
Start: 2025-04-11

## 2025-04-11 RX ORDER — OXYCODONE AND ACETAMINOPHEN 5; 325 MG/1; MG/1
1 TABLET ORAL
Qty: 30 TABLET | Refills: 0 | Status: SHIPPED | OUTPATIENT
Start: 2025-04-11 | End: 2025-04-18

## 2025-04-11 NOTE — PROGRESS NOTES
Name: Felipa Toledo    : 1950     [facility] [place]  [address]  [address]  [place] [address]  Dept: [phone]  Dept Fax: [phone]     Chief Complaint   Patient presents with    Pre-op Exam    Knee Pain        There were no vitals taken for this visit.     Allergies   Allergen Reactions    Other Anaphylaxis     Pt has intermstim to Right upper buttocks    Adhesive Tape Hives        Current Outpatient Medications   Medication Sig Dispense Refill    oxyCODONE-acetaminophen (PERCOCET) 5-325 MG per tablet Take 1 tablet by mouth every 4-6 hours as needed for Pain for up to 7 days. Do not start taking pain medication until after surgery! Max Daily Amount: 6 tablets 30 tablet 0    ondansetron (ZOFRAN-ODT) 8 MG TBDP disintegrating tablet TAKE EVERY 6-8 HOURS PRN NAUSEA / Do Not start until after surgery 20 tablet 0    aspirin 325 MG tablet Take 1 tablet by mouth in the morning and at bedtime DO NOT START UNTIL AFTER SURGERY / NO 90 DAY RX WILL BE PROVIDED AS PATIENT WILL ONLY TAKE THIS 30 DAYS POST SURGERY 60 tablet 0    cephALEXin (KEFLEX) 500 MG capsule Take 1 capsule by mouth every 8 (eight) hours for 7 days DO NOT START MEDICATION UNTIL AFTER SURGERY 21 capsule 0    amLODIPine (NORVASC) 10 MG tablet Take 1 tablet by mouth daily For hypertension 93 tablet 1    calcium carbonate (OSCAL) 500 MG TABS tablet Take 1 tablet by mouth 2 times daily 60 tablet 11    cetirizine (ZYRTEC) 10 MG tablet Take 1 tablet by mouth daily as needed for Allergies For allergies 100 tablet 3    levothyroxine (SYNTHROID) 88 MCG tablet Take 1 tablet by mouth daily 100 tablet 3    omeprazole (PRILOSEC) 20 MG delayed release capsule Take 1 capsule by mouth every morning (before breakfast) For reflux and gut protection from NSAID therapy 93 capsule 3    PARoxetine (PAXIL) 40 MG tablet Take 1 tablet by mouth daily 93 tablet 1    simvastatin

## 2025-04-14 PROBLEM — R41.3 MEMORY CHANGES: Status: RESOLVED | Noted: 2024-07-29 | Resolved: 2025-04-14

## 2025-04-14 PROBLEM — L50.9 URTICARIA: Status: RESOLVED | Noted: 2024-06-21 | Resolved: 2025-04-14

## 2025-04-14 NOTE — ASSESSMENT & PLAN NOTE
She reported experiencing allergy symptoms, including coughing, likely due to pollen exposure. A prescription for Zyrtec will be sent to her new pharmacy, [facility], for a duration of one year.  Continue Astelin spray prn    3/25/2025:  - Symptoms have persisted for six days, with clear or whitish sputum and occasional clear nasal discharge.  - No fever, body aches, or new symptoms reported; low-grade fever of 99.9°F noted yesterday.  - Discussion included the prevalence of influenza A and the allergic nature of symptoms due to clear discharge.  - Prescribed Astelin spray, one spray in each nostril twice daily for 7-10 days, advised to continue Zyrtec, start Delsym cough syrup (no food or drink for 20 minutes post-administration), and use Mucinex (without DM) for mucus clearance. Advised to stay hydrated and avoid dairy products.    4/3/2024:  Pollen is falling pretty heavy  Patient has not been taking her allergy medication  Strongly recommend she take medication especially when the pollen is high and she is outside in the yard working.

## 2025-04-14 NOTE — ASSESSMENT & PLAN NOTE
- TSH: 03/03/2025, 1.18  -Continue Synthroid 88 mcg daily  -Recheck level 12 months    11/22/2024:  Update 11/22/2024:  TSH remains low at 0.221 despite reducing Synthroid from 112 mics to 100 mics    Mayelin, please ensure patient is taking Synthroid by itself with nothing to eat or drink or any other medications for at least 45 minutes after taking Synthroid.  Please inform patient I have DC'd Synthroid 100 mics and sent prescription to her pharmacy for Synthroid 88 mics for 30 days.  Please have patient recheck her TSH the week of 12/16/2024.  Thank you    10/16/2024 OV note:  Reviewed labs from the ED and noted TSH to be low at 0.26  DC Synthroid 112 mics  Initiate Synthroid 100 mics  Sammy TSH level 11/18/24    4/3/2024 OV note:  TSH 0.7  Recheck level 12 months  Take Synthroid on an empty stomach first thing in the morning. Avoid eating, drinking (except water) or taking any other medications for at least 45 minutes after taking Synthroid as this may interfere with the absorption/effectiveness of Synthroid.

## 2025-04-14 NOTE — ASSESSMENT & PLAN NOTE
Monitored by specialist- no acute findings meriting change in the plan  Follow-up with ALFREDO Chase uro-/GYN    6/12/2024:  Pt seeing Dr Vazquez  She is very frustrated as her incont is worsening, causing skin irritation.   Placed referral to uro/gyne. Pt aware she may not see specialist for months  Recommend Calmoseptine to apply to irritated skin areas and to help protect skin from urine

## 2025-04-14 NOTE — ASSESSMENT & PLAN NOTE
She has been advised to resume her calcium supplementation regimen. She will continue her vitamin D supplementation every other week instead of weekly  Vit D level 70    4/3/2024:  DEXA 2018 revealed osteopenia  4/2023 placed order for DEXA-not completed  Placed new order today  Lifestyle measures: adequate calcium and vitamin D, exercise, smoking cessation, limit caffeine to 1-2 servings per day, and avoidance of heavy alcohol consumption. Reiterate importance of fall precautions. It is recommended 1200 mg of supplemental calcium daily, and 800 international units of vitamin D daily.  Have patient inquire with pharmacist the best supplement to use. Caltrate is a good choice.

## 2025-04-14 NOTE — ASSESSMENT & PLAN NOTE
Well-controlled, continue current medications  Continue Paxil 40 mg daily  Patient not seeking counseling or psych services

## 2025-04-14 NOTE — ASSESSMENT & PLAN NOTE
[facility] Wellness: Reviewed all HM and ordered tests/imaging appropriately. Reviewed care teams and medications with updates.     Reviewed and updated Healthcare Decision maker    Health maintenance.  - Annual wellness exam conducted.  - Discussed medication adherence and lifestyle modifications.  - Reviewed recent lab results and overall health status.  - Encouraged to continue regular follow-ups and preventive care measures.  She has been advised to receive the tetanus vaccine in preparation for her upcoming surgery. Additionally, she has been recommended to receive the RSV vaccine and the COVID-19 vaccine, although she has declined the latter.    Specialists:  Dr Ramirez, uro/gyne [facility]  Dr RAFAEL Frnacisco, ortho  Dr Castillo, opt

## 2025-04-14 NOTE — ASSESSMENT & PLAN NOTE
Well-controlled, continue current medications  omeprazole 20 mg daily prn  Avoid gut irritants such as spicy foods  Avoid laying down for 30 to 45 minutes after eating  Avoid excessive alcohol consumption

## 2025-04-14 NOTE — ASSESSMENT & PLAN NOTE
- Significant pain reported, elective surgery with Dr. ANA Francisco, Ortho planned.  - Physical exam findings: pain managed with diclofenac 75 mg once a day.  - Discussion: Celebrex previously tried but ineffective, patient prefers diclofenac.  - Treatment: Continue diclofenac 75 mg once a day, follow up with Dr. ANA Francisco for surgery scheduling.  -Surgery date 4/16/2025

## 2025-04-14 NOTE — ASSESSMENT & PLAN NOTE
Advanced Care Planning: Patient educated on the importance of an advanced care plan. If ACP has already been completed, request pt to make available to this office.      elects Full Code (Attempt Resuscitation)    Length of Voluntary ACP Conversation in minutes:  16 minutes

## 2025-04-14 NOTE — ASSESSMENT & PLAN NOTE
Well-controlled, continue current medications  CMP normal  Recheck level 12 months  Continue amlodipine 10 mg daily   Limit sodium in diet to 2g/d  Initiate cardio exercise  Weight reduction  Increase water intake  Check BP and report if 3 consecutive readings greater than 139/89 to office    4/3/2024:   Well-controlled, continue current medications  CMP normal  Recheck level 12 months  Continue amlodipine therapy  Limit sodium in diet to 2g/d  Initiate cardio exercise  Weight reduction  Increase water intake  Check BP and report if 3 consecutive readings greater than 139/89 to office

## 2025-04-14 NOTE — ASSESSMENT & PLAN NOTE
TG 99;   Continue simvastatin 40 mg nightly  Recheck level 12 months    4/3/2024:   Borderline controlled, continue current medications, medication adherence emphasized, and lifestyle modifications recommended  ; -132  Recheck level 12 months  Continue statin therapy    Reduce fried fatty or oily foods in diet  Limit red meats, processed foods, and alcohol.    Limit fast food  Increase physical activity to 60 minutes of moderate intensity aerobic exercise per week.  Increase water intake  Reduce alcohol intake    How to raise HDL if low:  Consume oats, beans, legumes, olive oil, whole grains, nuts, seeds, fatty fish, and berries.  Lose weight/fat  Reduce alcohol consumption  Smoking cessation

## 2025-04-14 NOTE — ASSESSMENT & PLAN NOTE
Vitamin D level 70  DC vitamin D 50,000 weekly  Initiate vitamin D 50,000 biweekly  Recheck level 12 months    4/3/2024:   Borderline controlled, continue current medications  Vitamin D 56 to 39.2  Recheck level 12 months  Refilled vitamin D 50,000 x 12 months

## 2025-04-14 NOTE — ASSESSMENT & PLAN NOTE
Her A1c was elevated at 6.1.   She is advised to monitor her diet and maintain a healthy lifestyle.   If her A1c increases above 6.4, metformin will be considered.    10/17/2024:  Her A1c was elevated at 6.1. She is advised to monitor her diet and maintain a healthy lifestyle. If her A1c increases above 6.4, metformin will be considered.

## 2025-04-22 ENCOUNTER — OFFICE VISIT (OUTPATIENT)
Age: 75
End: 2025-04-22

## 2025-04-22 DIAGNOSIS — M25.562 LEFT KNEE PAIN, UNSPECIFIED CHRONICITY: ICD-10-CM

## 2025-04-22 DIAGNOSIS — Z96.652 TOTAL KNEE REPLACEMENT STATUS, LEFT: ICD-10-CM

## 2025-04-22 DIAGNOSIS — Z96.652 STATUS POST TOTAL KNEE REPLACEMENT, LEFT: Primary | ICD-10-CM

## 2025-04-22 PROCEDURE — 99024 POSTOP FOLLOW-UP VISIT: CPT

## 2025-04-22 NOTE — PATIENT INSTRUCTIONS
** Please do not utilize these instructions until after your follow up appointment **    Post Operative Total Knee Replacement Instructions    PLEASE REMOVE YOUR LONG WHITE BANDAGE & STOCKING PRIOR TO CONNECTING TO YOUR APPOINTMENT       During your recovery from a total knee replacement, you will be participating in an OUTPATIENT physical therapy program. Your goal is to progress from a walker to a cane to nothing at all while walking, if possible, over the next 2 weeks.     You can now shower and get your incision wet, pat it dry afterwards. No further dressing changes will be required as long as there is no drainage.  You may not submerge the leg in a bath, pool, hot tub or other body of water such as a lake until at least 6 weeks post surgery as long as there is no drainage from your incision, open areas along the incision, or areas of concern.    You may drive if you are not using any assistive devices to walk and are not using any narcotic pain medication.     You may discontinue your aspirin (if that is your primary blood thinner prescribed by Dr. Francisco ) when you are at least 4 weeks out from surgery AND are no longer using a cane or walker.  If you are still using assistive devices, please DO NOT stop the aspirin until you are completely off them.  If you are on other blood thinners prescribed by another doctor please continue that until you are instructed to discontinue them.    You and your physical therapist will determine when to stop your physical therapy program.    Narcotic pain medication can cause constipation.  You may take over the counter stool softeners such as Docusate Sodium or Miralax 1-2 times per day to assist with the constipation.  Ensure you are taking in plenty of fluids and fiber as well.    If you require a refill on a narcotic pain medication, please let Dr. Francisco or his Nurse Practitioner know at your appointment today or AT LEAST 48 hours prior to needing it. No refills will be

## 2025-04-22 NOTE — PROGRESS NOTES
Name: Felipa Toledo    : 1950        2025     1:00 PM 2025     3:13 PM 2025     2:38 PM 3/14/2025    10:06 AM 2025     1:06 PM 2024    10:50 AM 2024    12:02 PM   Ambulatory Bariatric Summary   Systolic 132 142 149       Diastolic 72 74 77       Pulse 79  93       Temp 98.3 °F (36.8 °C)  98.2 °F (36.8 °C)   97.1 °F (36.2 °C)    Respirations 16  18  18     Weight - Scale 184  186 185 182 182 185   Height 1.676 m (5' 6\")  1.676 m (5' 6\") 1.676 m (5' 6\") 1.702 m (5' 7\") 1.702 m (5' 7\") 1.702 m (5' 7\")   BMI 29.8 kg/m2  30.1 kg/m2 29.9 kg/m2 28.6 kg/m2 28.6 kg/m2 29 kg/m2   Weight - Scale 83.5 kg (184 lb)  84.4 kg (186 lb) 83.9 kg (185 lb) 82.6 kg (182 lb) 82.6 kg (182 lb) 83.9 kg (185 lb)   BMI (Calculated) 29.8  30.1 29.9 28.6 28.6 29       There is no height or weight on file to calculate BMI.    Service Dept: [facility]  [address] [place] [id number]  Dept: [phone]  Dept Fax: [phone]     Patient's Pharmacies:    [id number] - [place], [place] - [address] [facility] [phone] - F [phone]  [address] [facility] [address]  Phone: [phone] Fax: [phone]    [facility] - [address] P [phone] - F [phone]  [address] [facility] [address]  Phone: [phone] Fax: [phone]       Chief Complaint   Patient presents with    Knee Pain    Post-Op Check     LTKA       HPI:  Patient presents for postop care following a left total knee replacement 2025.  Patient sleeping well with a walker.  Patient reports she had a fall 2 to 3 days postoperatively while ambulating through her house and the walker caught on the road causing her to fall.  Patient reports she landed on top of her walker creating a bend in one of the legs of the walker.  Patient denies increased pain or swelling or

## 2025-04-28 ENCOUNTER — TELEPHONE (OUTPATIENT)
Age: 75
End: 2025-04-28

## 2025-04-28 DIAGNOSIS — M25.562 LEFT KNEE PAIN, UNSPECIFIED CHRONICITY: ICD-10-CM

## 2025-04-28 DIAGNOSIS — Z96.652 STATUS POST TOTAL KNEE REPLACEMENT, LEFT: Primary | ICD-10-CM

## 2025-04-28 RX ORDER — OXYCODONE AND ACETAMINOPHEN 5; 325 MG/1; MG/1
1 TABLET ORAL
Qty: 30 TABLET | Refills: 0 | Status: SHIPPED | OUTPATIENT
Start: 2025-04-28 | End: 2025-05-05

## 2025-04-28 NOTE — TELEPHONE ENCOUNTER
Patient called in requesting pain medication refill.      Surgery: LEFT TOTAL KNEE REPLACEMENT  4.16.2025      Medication: oxyCODONE-acetaminophen (PERCOCET) 5-325 MG per tablet       Last Refill: 4.11.2025      Pharmacy: [id number] - [place], [place] - [address] [facility] - P [phone] - F [phone]  [address] [facility] [address]  Phone: [phone]  Fax: [phone]

## 2025-05-02 PROBLEM — Z01.818 PREOPERATIVE CLEARANCE: Status: RESOLVED | Noted: 2025-04-02 | Resolved: 2025-05-02

## 2025-05-08 PROBLEM — Z00.00 MEDICARE ANNUAL WELLNESS VISIT, SUBSEQUENT: Status: RESOLVED | Noted: 2023-04-12 | Resolved: 2025-05-08

## 2025-06-11 DIAGNOSIS — M17.11 OSTEOARTHRITIS OF RIGHT KNEE, UNSPECIFIED OSTEOARTHRITIS TYPE: Primary | ICD-10-CM

## 2025-06-13 ENCOUNTER — OFFICE VISIT (OUTPATIENT)
Age: 75
End: 2025-06-13

## 2025-06-13 DIAGNOSIS — Z96.652 TOTAL KNEE REPLACEMENT STATUS, LEFT: Primary | ICD-10-CM

## 2025-06-13 NOTE — PROGRESS NOTES
Epigastric pain     Gastroesophageal reflux disease without esophagitis 10/10/2023    Headache(784.0)     migraine    Hypercholesterolemia     Hyperlipidemia LDL goal <130 08/08/2016    Hypertension     Lymphedema 04/01/2022    Major depressive disorder, single episode, in full remission 04/07/2023    Mixed hyperlipidemia 08/08/2016    Mixed incontinence 07/07/2016    Multiple closed fractures of ribs of right side 12/24/2019    Neck pain     Numbness of arm 12/18/2006    left    OAB (overactive bladder) 05/08/2018    Obesity     Need to loose weight to help with my knees    NORMA (obstructive sleep apnea)     no cpap    Osteoarthritis of left knee 04/07/2025    Osteopenia after menopause 04/12/2023    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    Dr Bobo, P  No intervention at this time    Pes anserinus bursitis of right knee 04/12/2023    Phlebitis of saphenous vein 04/27/2007    Left    Plantar fasciitis, bilateral 02/04/2021    Primary hypertension 06/06/2016    Primary osteoarthritis of right knee 04/12/2023    Right renal mass 03/21/2024    Thyroid disease     hypothyroidism    Urinary incontinence     Urticaria 06/21/2024    Vitamin D deficiency 12/22/2015    Vitamin D deficiency, unspecified 12/22/2015        I have reviewed and agree with PFSH and ROS and intake form in chart and the record furthermore I have reviewed prior medical record(s) regarding this patients care during this appointment.   Review of Systems:   Patient is a pleasant appearing individual, appropriately dressed, well hydrated, well nourished, who is alert, appropriately oriented for age, and in no acute distress with a normal gait and normal affect who does not appear to be in any significant pain.      Physical Exam:  Right knee - Neurovascularly intact with good cap refill,  extensor mechanism intact, full range of motion and full strength, well healed incision noted, no swelling, no erythema, no instability.     Left knee -

## 2025-06-20 DIAGNOSIS — Z96.651 STATUS POST TOTAL RIGHT KNEE REPLACEMENT: Primary | ICD-10-CM

## 2025-06-27 ENCOUNTER — OFFICE VISIT (OUTPATIENT)
Age: 75
End: 2025-06-27

## 2025-06-27 DIAGNOSIS — M17.11 OSTEOARTHRITIS OF RIGHT KNEE, UNSPECIFIED OSTEOARTHRITIS TYPE: Primary | ICD-10-CM

## 2025-06-27 RX ORDER — CEPHALEXIN 500 MG/1
500 CAPSULE ORAL EVERY 8 HOURS
Qty: 21 CAPSULE | Refills: 0 | Status: SHIPPED | OUTPATIENT
Start: 2025-06-27 | End: 2025-07-04

## 2025-06-27 RX ORDER — ASPIRIN 325 MG
325 TABLET ORAL 2 TIMES DAILY
Qty: 60 TABLET | Refills: 0 | Status: SHIPPED | OUTPATIENT
Start: 2025-06-27

## 2025-06-27 RX ORDER — OXYCODONE AND ACETAMINOPHEN 5; 325 MG/1; MG/1
1 TABLET ORAL
Qty: 30 TABLET | Refills: 0 | Status: SHIPPED | OUTPATIENT
Start: 2025-06-27 | End: 2025-07-04

## 2025-06-27 NOTE — PROGRESS NOTES
Name: Felipa Toledo    : 1950     [facility] [place]  [address]  [address]  [place] [address]  Dept: [phone]  Dept Fax: [phone]   Chief Complaint   Patient presents with    Pre-op Exam    Knee Pain     There were no vitals taken for this visit.   Allergies   Allergen Reactions    Other Anaphylaxis     Pt has intermstim to Right upper buttocks    Adhesive Tape Hives     Current Outpatient Medications   Medication Sig Dispense Refill    ondansetron (ZOFRAN-ODT) 8 MG TBDP disintegrating tablet TAKE EVERY 6-8 HOURS PRN NAUSEA / Do Not start until after surgery 20 tablet 0    aspirin 325 MG tablet Take 1 tablet by mouth in the morning and at bedtime DO NOT START UNTIL AFTER SURGERY / NO 90 DAY RX WILL BE PROVIDED AS PATIENT WILL ONLY TAKE THIS 30 DAYS POST SURGERY 60 tablet 0    amLODIPine (NORVASC) 10 MG tablet Take 1 tablet by mouth daily For hypertension 93 tablet 1    calcium carbonate (OSCAL) 500 MG TABS tablet Take 1 tablet by mouth 2 times daily 60 tablet 11    cetirizine (ZYRTEC) 10 MG tablet Take 1 tablet by mouth daily as needed for Allergies For allergies 100 tablet 3    levothyroxine (SYNTHROID) 88 MCG tablet Take 1 tablet by mouth daily 100 tablet 3    omeprazole (PRILOSEC) 20 MG delayed release capsule Take 1 capsule by mouth every morning (before breakfast) For reflux and gut protection from NSAID therapy 93 capsule 3    PARoxetine (PAXIL) 40 MG tablet Take 1 tablet by mouth daily 93 tablet 1    simvastatin (ZOCOR) 40 MG tablet Take 1 tablet by mouth nightly For cholesterol 100 tablet 3    vitamin D (ERGOCALCIFEROL) 1.25 MG (27716 UT) CAPS capsule Take 1 capsule by mouth every 14 days 13 capsule 1    diclofenac (VOLTAREN) 75 MG EC tablet Take 1 tablet by mouth daily as needed for Pain 90 tablet 3    azelastine (ASTELIN) 0.1 % nasal spray 1 spray by Nasal route 2 times daily Use in each nostril as

## 2025-07-02 ENCOUNTER — HOSPITAL ENCOUNTER (OUTPATIENT)
Age: 75
Discharge: HOME | End: 2025-07-05
Payer: MEDICARE

## 2025-07-02 DIAGNOSIS — M17.11 ARTHRITIS OF RIGHT KNEE: ICD-10-CM

## 2025-07-02 PROCEDURE — 73560 X-RAY EXAM OF KNEE 1 OR 2: CPT

## 2025-07-07 ENCOUNTER — HOSPITAL ENCOUNTER (OUTPATIENT)
Facility: HOSPITAL | Age: 75
Setting detail: RECURRING SERIES
Discharge: HOME | End: 2025-07-10
Attending: ORTHOPAEDIC SURGERY
Payer: MEDICARE

## 2025-07-07 PROCEDURE — 97161 PT EVAL LOW COMPLEX 20 MIN: CPT

## 2025-07-07 NOTE — THERAPY EVALUATION
[facility]  [address], [address], [place] [address]  Ph:[phone] Fx:[phone]  Plan of Care / Statement of Necessity for Physical Therapy Services     Patient Name: Felipa Toledo : 1950   Medical   Diagnosis: Status post total right knee replacement Treatment Diagnosis:  M25.561  RIGHT KNEE PAIN    Onset Date: P/O 25     Referral Source: Rudy Francisco MD Start of Care (SOC): 2025   Prior Hospitalization: See medical history Provider #: [id number]   Prior Level of Function:  R knee pain past 7 years, I ADLS and activities and no AD , drove and tolerated household and community activities    Comorbidities:  Musculoskeletal disorders, HTN, arthritis, thyroid problems, RECENT L TKA     Assessment / key information:  76 YO female diagnosed as above and with S/S consistent with above diagnosis presents to skilled outpatient PT [facility] Recovery from R TKA 25. She has 4/10 pain and is using RW PRN. LOM, decrease strength B knees and B LE, decrease flexibility B LE, gait deviations, decrease tolerance to stair negotiation. Patient demonstrates the potential to make gains with improved ROM, strength, endurance/activity tolerance, functional LEFS survey score baseline 14 and all within a reasonable time frame so as to increase their functional independence with ADLs and activities for carryover to  improved quality of life, tolerance to any household and community activities. Patient requires skilled Physical Therapy so as to monitor their response to and modify their treatment plan accordingly. Patient appears to be an appropriate candidate for skilled outpatient Physical Therapy.       POST OPERATIVE URGENT: Patient was evaluated for a post operative condition and early physical therapy intervention is critical to positive outcomes.  Insurance authorization should be expedited to prevent delay in treatment.      Evaluation Complexity:

## 2025-07-07 NOTE — PROGRESS NOTES
PT DAILY TREATMENT NOTE/KNEE EVAL 3-25      Patient Name: Felipa Toledo    Date: 2025    : 1950  Insurance: Payor: [employer] MEDICARE / Plan: JANIA [facility] [employer] MEDIBLUE PLUS / Product Type: *No Product type* /      Patient  verified yes     Visit #   Current / Total 1 16   Time   In / Out 1250 135   Pain   In / Out 4 4   Subjective Functional Status/Changes:       Treatment Area: Status post total right knee replacement    SUBJECTIVE  Pain Level (0-10 scale): 4  [x]constant []intermittent [x]improving []worsening []no change since onset    Subjective functional status/changes:     PLOF: R knee pain past 7 years, I ADLS and activities and no AD , drove and tolerated household and community activities  Limitations to PLOF: recovery from R TKA  Mechanism of Injury: R TKA 25, 7 year history degenerative issues R knee  Current symptoms/Complaints: 74 YO female diagnosed as above and with S/S consistent with above diagnosis presents to skilled outpatient PT [facility] Recovery from R TKA 25. She has 4/10 pain and is using RW PRN  Previous Treatment/Compliance: conservative treatment prior to surgery, injections, gel shots, MD, ice (ice machine), tylenol, has CPM  PMHx/Surgical Hx: HTN, arthritis, thyroid problems, L TKA  Work Hx: retired  Living Situation:  lives in a 2 STY, 2 [facility], no rails, not alone, bedroom is upstairs  Pt Goals: walk better and get back in the yard  Barriers: [x]pain []financial []time []transportation []other  Motivation: good  Substance use: []Alcohol []Tobacco []other:   FABQ Score: []low []elevate  Cognition: A & O x 3    Other:    OBJECTIVE/EXAMINATION  Domestic Life: household and community activities, watch TV, yard work as able  Activity/Recreational Limitations: P/O recovery  Mobility: RW  Self Care: I         45 min []Eval - untimed                       Charge Capture    [x]  Patient Education billed concurrently with other procedures     Other Objective/Functional

## 2025-07-09 ENCOUNTER — APPOINTMENT (OUTPATIENT)
Facility: HOSPITAL | Age: 75
End: 2025-07-09
Attending: ORTHOPAEDIC SURGERY
Payer: MEDICARE

## 2025-07-11 ENCOUNTER — OFFICE VISIT (OUTPATIENT)
Age: 75
End: 2025-07-11

## 2025-07-11 DIAGNOSIS — Z96.651 PRESENCE OF RIGHT ARTIFICIAL KNEE JOINT: Primary | ICD-10-CM

## 2025-07-11 PROCEDURE — 99024 POSTOP FOLLOW-UP VISIT: CPT

## 2025-07-11 RX ORDER — METHYLPREDNISOLONE 4 MG/1
TABLET ORAL
Qty: 1 KIT | Refills: 0 | Status: SHIPPED | OUTPATIENT
Start: 2025-07-11

## 2025-07-11 RX ORDER — OXYCODONE AND ACETAMINOPHEN 5; 325 MG/1; MG/1
1 TABLET ORAL
Qty: 30 TABLET | Refills: 0 | Status: SHIPPED | OUTPATIENT
Start: 2025-07-11 | End: 2025-07-18

## 2025-07-11 NOTE — PROGRESS NOTES
exercises at home. She experienced increased nausea compared to her previous knee surgery and significant hip pain, likely due to positioning during surgery.    A prescription for Medrol Dosepak has been provided to alleviate hip pain, with detailed dosage instructions explained. She was advised to use heat packs and topical pain relievers for additional relief. A refill of her pain medication has been sent. Continued diligence with home exercises is emphasized to aid recovery.     Follow-up: A follow-up visit is scheduled in 4 to 6 weeks.    As part of continued conservative pain management options the patient was advised to utilize Tylenol or OTC NSAIDS as long as it is not medically contraindicated.   Return to Office:    Follow-up and Dispositions    Return in about 5 weeks (around 8/15/2025) for HBV, w/ Brittnee Soares.        Scribed by Carolyn Mercado as dictated by JOSE ALFREDO Wahl  Documentation, performed by, True and Accepted JOSE ALFREDO Wahl    The patient (or guardian, if applicable) and other individuals in attendance with the patient were advised that Artificial Intelligence will be utilized during this visit to record, process the conversation to generate a clinical note, and support improvement of the AI technology. The patient (or guardian, if applicable) and other individuals in attendance at the appointment consented to the use of AI, including the recording.

## 2025-07-11 NOTE — PATIENT INSTRUCTIONS
[employer] disclaims any warranty or liability for your use of this information.

## 2025-07-16 ENCOUNTER — HOSPITAL ENCOUNTER (OUTPATIENT)
Facility: HOSPITAL | Age: 75
Setting detail: RECURRING SERIES
Discharge: HOME | End: 2025-07-19
Attending: ORTHOPAEDIC SURGERY
Payer: MEDICARE

## 2025-07-16 PROCEDURE — 97112 NEUROMUSCULAR REEDUCATION: CPT

## 2025-07-16 PROCEDURE — 97530 THERAPEUTIC ACTIVITIES: CPT

## 2025-07-16 PROCEDURE — 97110 THERAPEUTIC EXERCISES: CPT

## 2025-07-16 NOTE — PROGRESS NOTES
PHYSICAL / OCCUPATIONAL THERAPY - DAILY TREATMENT NOTE    Patient Name: Felipa Toledo    Date: 2025    : 1950  Insurance: Payor: [facility] MEDICARE / Plan: JANIA [facility] [employer] MEDIBLUE PLUS / Product Type: *No Product type* /      Patient  verified Yes     Visit #   Current / Total 2 16   Time   In / Out 1130 1220   Pain   In / Out 5 3   Subjective Functional Status/Changes: This is my first visit since my evaluation      TREATMENT AREA =  Status post total right knee replacement  Pain in right knee    OBJECTIVE    Modalities Rationale:     decrease edema, decrease inflammation, and decrease pain to improve patient's ability to progress to PLOF and address remaining functional goals.     min [] Estim Unattended, type/location:                                      []  w/ice    []  w/heat    min [] Estim Attended, type/location:                                     []  w/US     []  w/ice    []  w/heat    []  TENS insruct      min []  Mechanical Traction: type/lbs                   []  pro   []  sup   []  int   []  cont    []  before manual    []  after manual    min []  Ultrasound, settings/location:     10 min  unbill [x]  Ice     []  Heat    location/position: R knee in supine with wedge     min []  Paraffin,  details:     min []  Vasopneumatic Device, press/temp:     min []  Whirlpool / Fluido:    If using vaso (only need to measure limb vaso being performed on)      pre-treatment girth :       post-treatment girth :       measured at (landmark location) :      min []  Other:    Skin assessment post-treatment:   Intact      Therapeutic Procedures:    Tx Min Billable or 1:1 Min (if diff from Tx Min) Procedure, Rationale, Specifics   18 18 [id number] Therapeutic Exercise (timed):  increase ROM, strength, coordination, balance, and proprioception to improve patient's ability to progress to PLOF and address remaining functional goals. (see flow sheet as applicable)     Details if applicable:       10

## 2025-07-18 ENCOUNTER — HOSPITAL ENCOUNTER (OUTPATIENT)
Facility: HOSPITAL | Age: 75
Setting detail: RECURRING SERIES
Discharge: HOME | End: 2025-07-21
Attending: ORTHOPAEDIC SURGERY
Payer: MEDICARE

## 2025-07-18 PROCEDURE — 97112 NEUROMUSCULAR REEDUCATION: CPT

## 2025-07-18 PROCEDURE — 97530 THERAPEUTIC ACTIVITIES: CPT

## 2025-07-18 PROCEDURE — 97110 THERAPEUTIC EXERCISES: CPT

## 2025-07-18 NOTE — PROGRESS NOTES
PHYSICAL / OCCUPATIONAL THERAPY - DAILY TREATMENT NOTE    Patient Name: Felipa Toledo    Date: 2025    : 1950  Insurance: Payor: [facility] MEDICARE / Plan: JANIA [facility] HE[employer]EEPERS MEDIBLUE PLUS / Product Type: *No Product type* /      Patient  verified Yes     Visit #   Current / Total 2 16   Time   In / Out 1133 1230   Pain   In / Out 3 1   Subjective Functional Status/Changes: This is my first visit since my evaluation      TREATMENT AREA =  Status post total right knee replacement  Pain in right knee    OBJECTIVE    Modalities Rationale:     decrease edema, decrease inflammation, and decrease pain to improve patient's ability to progress to PLOF and address remaining functional goals.     min [] Estim Unattended, type/location:                                      []  w/ice    []  w/heat    min [] Estim Attended, type/location:                                     []  w/US     []  w/ice    []  w/heat    []  TENS insruct      min []  Mechanical Traction: type/lbs                   []  pro   []  sup   []  int   []  cont    []  before manual    []  after manual    min []  Ultrasound, settings/location:     10 min  unbill [x]  Ice     []  Heat    location/position: R knee in supine with wedge     min []  Paraffin,  details:     min []  Vasopneumatic Device, press/temp:     min []  Whirlpool / Fluido:    If using vaso (only need to measure limb vaso being performed on)      pre-treatment girth :       post-treatment girth :       measured at (landmark location) :      min []  Other:    Skin assessment post-treatment:   Intact      Therapeutic Procedures:    Tx Min Billable or 1:1 Min (if diff from Tx Min) Procedure, Rationale, Specifics   20 18 [id number] Therapeutic Exercise (timed):  increase ROM, strength, coordination, balance, and proprioception to improve patient's ability to progress to PLOF and address remaining functional goals. (see flow sheet as applicable)     Details if applicable:       15

## 2025-07-22 ENCOUNTER — PATIENT MESSAGE (OUTPATIENT)
Facility: CLINIC | Age: 75
End: 2025-07-22

## 2025-07-22 DIAGNOSIS — T36.95XA ANTIBIOTIC-INDUCED YEAST INFECTION: Primary | ICD-10-CM

## 2025-07-22 DIAGNOSIS — B37.9 ANTIBIOTIC-INDUCED YEAST INFECTION: Primary | ICD-10-CM

## 2025-07-22 RX ORDER — FLUCONAZOLE 150 MG/1
TABLET ORAL
Qty: 2 TABLET | Refills: 0 | Status: SHIPPED | OUTPATIENT
Start: 2025-07-22

## 2025-07-23 ENCOUNTER — APPOINTMENT (OUTPATIENT)
Facility: HOSPITAL | Age: 75
End: 2025-07-23
Attending: ORTHOPAEDIC SURGERY
Payer: MEDICARE

## 2025-07-23 ENCOUNTER — TELEPHONE (OUTPATIENT)
Facility: HOSPITAL | Age: 75
End: 2025-07-23

## 2025-07-25 ENCOUNTER — HOSPITAL ENCOUNTER (OUTPATIENT)
Facility: HOSPITAL | Age: 75
Setting detail: RECURRING SERIES
Discharge: HOME | End: 2025-07-28
Attending: ORTHOPAEDIC SURGERY
Payer: MEDICARE

## 2025-07-25 PROCEDURE — 97530 THERAPEUTIC ACTIVITIES: CPT

## 2025-07-25 PROCEDURE — 97110 THERAPEUTIC EXERCISES: CPT

## 2025-07-25 PROCEDURE — 97140 MANUAL THERAPY 1/> REGIONS: CPT

## 2025-07-25 NOTE — PROGRESS NOTES
PHYSICAL / OCCUPATIONAL THERAPY - DAILY TREATMENT NOTE    Patient Name: Felipa Toledo    Date: 2025    : 1950  Insurance: Payor: [facility] MEDICARE / Plan: JANIA [facility] [employer]EEPERS MEDIBLUE PLUS / Product Type: *No Product type* /      Patient  verified Yes     Visit #   Current / Total 3 16   Time   In / Out 1215 pm  104 pm    Pain   In / Out 0/10  010    Subjective Functional Status/Changes: \"Today was the first day that I drove and didn't knee my cane to get going.\"      TREATMENT AREA =  Status post total right knee replacement  Pain in right knee    OBJECTIVE    Modalities Rationale:     decrease inflammation and decrease pain to improve patient's ability to progress to PLOF and address remaining functional goals.     min [] Estim Unattended, type/location:                                      []  w/ice    []  w/heat    min [] Estim Attended, type/location:                                     []  w/US     []  w/ice    []  w/heat    []  TENS insruct      min []  Mechanical Traction: type/lbs                   []  pro   []  sup   []  int   []  cont    []  before manual    []  after manual    min []  Ultrasound, settings/location:     10 min  unbill [x]  Ice     []  Heat    location/position:     min []  Paraffin,  details:     min []  Vasopneumatic Device, press/temp:     min []  Whirlpool / Fluido:    If using vaso (only need to measure limb vaso being performed on)      pre-treatment girth :       post-treatment girth :       measured at (landmark location) :      min []  Other:    Skin assessment post-treatment:   Intact      Therapeutic Procedures:    Tx Min Billable or 1:1 Min (if diff from Tx Min) Procedure, Rationale, Specifics   12  [id number] Therapeutic Exercise (timed):  increase ROM, strength, coordination, balance, and proprioception to improve patient's ability to progress to PLOF and address remaining functional goals. (see flow sheet as applicable)     Details if applicable:

## 2025-07-30 ENCOUNTER — HOSPITAL ENCOUNTER (OUTPATIENT)
Facility: HOSPITAL | Age: 75
Setting detail: RECURRING SERIES
Discharge: HOME | End: 2025-08-02
Attending: ORTHOPAEDIC SURGERY
Payer: MEDICARE

## 2025-07-30 PROCEDURE — 97530 THERAPEUTIC ACTIVITIES: CPT

## 2025-07-30 PROCEDURE — 97110 THERAPEUTIC EXERCISES: CPT

## 2025-07-30 PROCEDURE — 97140 MANUAL THERAPY 1/> REGIONS: CPT

## 2025-07-30 NOTE — PROGRESS NOTES
PHYSICAL / OCCUPATIONAL THERAPY - DAILY TREATMENT NOTE    Patient Name: Felipa Toledo    Date: 2025    : 1950  Insurance: Payor: [facility] MEDICARE / Plan: JANIA [facility] [employer]EEPERS MEDIBLUE PLUS / Product Type: *No Product type* /      Patient  verified Yes     Visit #   Current / Total 4 16   Time   In / Out 1215 pm 105 pm    Pain   In / Out 4/10  010    Subjective Functional Status/Changes: Patient reports having increased right knee pain today. Patient explains that her pain is on the outside of knee today.      TREATMENT AREA =  Status post total right knee replacement  Pain in right knee    OBJECTIVE    Modalities Rationale:     decrease inflammation and decrease pain to improve patient's ability to progress to PLOF and address remaining functional goals.     min [] Estim Unattended, type/location:                                      []  w/ice    []  w/heat    min [] Estim Attended, type/location:                                     []  w/US     []  w/ice    []  w/heat    []  TENS insruct      min []  Mechanical Traction: type/lbs                   []  pro   []  sup   []  int   []  cont    []  before manual    []  after manual    min []  Ultrasound, settings/location:     10 min  unbill [x]  Ice     []  Heat    location/position: Reclined to right knee     min []  Paraffin,  details:     min []  Vasopneumatic Device, press/temp:     min []  Whirlpool / Fluido:    If using vaso (only need to measure limb vaso being performed on)      pre-treatment girth :       post-treatment girth :       measured at (landmark location) :      min []  Other:    Skin assessment post-treatment:   Intact      Therapeutic Procedures:    Tx Min Billable or 1:1 Min (if diff from Tx Min) Procedure, Rationale, Specifics   15  [id number] Therapeutic Exercise (timed):  increase ROM, strength, coordination, balance, and proprioception to improve patient's ability to progress to PLOF and address remaining functional goals.

## 2025-08-01 ENCOUNTER — HOSPITAL ENCOUNTER (OUTPATIENT)
Facility: HOSPITAL | Age: 75
Setting detail: RECURRING SERIES
Discharge: HOME | End: 2025-08-04
Attending: ORTHOPAEDIC SURGERY
Payer: MEDICARE

## 2025-08-01 PROCEDURE — 97530 THERAPEUTIC ACTIVITIES: CPT

## 2025-08-01 PROCEDURE — 97140 MANUAL THERAPY 1/> REGIONS: CPT

## 2025-08-01 PROCEDURE — 97110 THERAPEUTIC EXERCISES: CPT

## 2025-08-04 ENCOUNTER — HOSPITAL ENCOUNTER (OUTPATIENT)
Facility: HOSPITAL | Age: 75
Setting detail: RECURRING SERIES
Discharge: HOME | End: 2025-08-07
Attending: ORTHOPAEDIC SURGERY
Payer: MEDICARE

## 2025-08-04 PROCEDURE — 97530 THERAPEUTIC ACTIVITIES: CPT

## 2025-08-04 PROCEDURE — 97110 THERAPEUTIC EXERCISES: CPT

## 2025-08-04 PROCEDURE — 97140 MANUAL THERAPY 1/> REGIONS: CPT

## 2025-08-06 ENCOUNTER — TELEPHONE (OUTPATIENT)
Facility: HOSPITAL | Age: 75
End: 2025-08-06

## 2025-08-06 ENCOUNTER — APPOINTMENT (OUTPATIENT)
Facility: HOSPITAL | Age: 75
End: 2025-08-06
Attending: ORTHOPAEDIC SURGERY
Payer: MEDICARE

## 2025-08-08 ENCOUNTER — OFFICE VISIT (OUTPATIENT)
Age: 75
End: 2025-08-08

## 2025-08-08 DIAGNOSIS — M25.561 RIGHT KNEE PAIN, UNSPECIFIED CHRONICITY: Primary | ICD-10-CM

## 2025-08-08 PROCEDURE — 99024 POSTOP FOLLOW-UP VISIT: CPT

## 2025-08-11 ENCOUNTER — APPOINTMENT (OUTPATIENT)
Facility: HOSPITAL | Age: 75
End: 2025-08-11
Attending: ORTHOPAEDIC SURGERY
Payer: MEDICARE

## 2025-08-13 ENCOUNTER — APPOINTMENT (OUTPATIENT)
Facility: HOSPITAL | Age: 75
End: 2025-08-13
Attending: ORTHOPAEDIC SURGERY
Payer: MEDICARE

## 2025-08-18 ENCOUNTER — APPOINTMENT (OUTPATIENT)
Facility: HOSPITAL | Age: 75
End: 2025-08-18
Attending: ORTHOPAEDIC SURGERY
Payer: MEDICARE

## 2025-08-20 ENCOUNTER — APPOINTMENT (OUTPATIENT)
Facility: HOSPITAL | Age: 75
End: 2025-08-20
Attending: ORTHOPAEDIC SURGERY
Payer: MEDICARE

## 2025-08-20 DIAGNOSIS — I10 PRIMARY HYPERTENSION: ICD-10-CM

## 2025-08-20 RX ORDER — AMLODIPINE BESYLATE 10 MG/1
TABLET ORAL
Qty: 93 TABLET | Refills: 1 | OUTPATIENT
Start: 2025-08-20

## 2025-08-25 ENCOUNTER — APPOINTMENT (OUTPATIENT)
Facility: HOSPITAL | Age: 75
End: 2025-08-25
Attending: ORTHOPAEDIC SURGERY
Payer: MEDICARE

## 2025-09-02 ENCOUNTER — PATIENT MESSAGE (OUTPATIENT)
Facility: CLINIC | Age: 75
End: 2025-09-02

## 2025-09-02 DIAGNOSIS — I10 PRIMARY HYPERTENSION: ICD-10-CM

## 2025-09-02 DIAGNOSIS — F32.5 MAJOR DEPRESSIVE DISORDER, SINGLE EPISODE, IN FULL REMISSION: ICD-10-CM

## 2025-09-04 RX ORDER — PAROXETINE 40 MG/1
40 TABLET, FILM COATED ORAL DAILY
Qty: 90 TABLET | Refills: 0 | Status: SHIPPED | OUTPATIENT
Start: 2025-09-04

## 2025-09-04 RX ORDER — AMLODIPINE BESYLATE 10 MG/1
10 TABLET ORAL DAILY
Qty: 90 TABLET | Refills: 0 | Status: SHIPPED | OUTPATIENT
Start: 2025-09-04

## (undated) DEVICE — SUTURE VCRL SZ 3-0 L27IN ABSRB UD L26MM SH 1/2 CIR J416H

## (undated) DEVICE — MEDI-VAC NON-CONDUCTIVE SUCTION TUBING: Brand: CARDINAL HEALTH

## (undated) DEVICE — KENDALL SCD EXPRESS SLEEVES, KNEE LENGTH, MEDIUM: Brand: KENDALL SCD

## (undated) DEVICE — FLEX ADVANTAGE 3000CC: Brand: FLEX ADVANTAGE

## (undated) DEVICE — LEAD KT INTRO INTERSTIM --

## (undated) DEVICE — SYR 10ML CTRL LR LCK NSAF LF --

## (undated) DEVICE — STER SINGLE BASIN SET W/BOWLS: Brand: CARDINAL HEALTH

## (undated) DEVICE — BAG DRAINAGE CUST DISP

## (undated) DEVICE — EXTERNAL NEUROSTIMULATOR

## (undated) DEVICE — ADHESIVE TISS DERMA FLEX 0.7ML -- HIGH VISCOSITY

## (undated) DEVICE — DERMABOND SKIN ADH 0.7ML -- DERMABOND ADVANCED 12/BX

## (undated) DEVICE — GOWN,PREVENTION PLUS,XLN/XL,ST,24/CS: Brand: MEDLINE

## (undated) DEVICE — NEEDLE HYPO 25GA L1.5IN BVL ORIENTED ECLIPSE

## (undated) DEVICE — Z DUP USE 2565107 PACK SURG PROC LEG CYSTO T-DRAPE REINF TBL CVR HND TWL

## (undated) DEVICE — 1010 S-DRAPE TOWEL DRAPE 10/BX: Brand: STERI-DRAPE™

## (undated) DEVICE — SOLUTION IRRIG 3000ML 0.9% SOD CHL FLX CONT 0797208] ICU MEDICAL INC]

## (undated) DEVICE — 3M™ TEGADERM™ TRANSPARENT FILM DRESSING FRAME STYLE, 1626W, 4 IN X 4-3/4 IN (10 CM X 12 CM), 50/CT 4CT/CASE: Brand: 3M™ TEGADERM™

## (undated) DEVICE — BSHR MAJOR BASIN PACK-LF: Brand: MEDLINE INDUSTRIES, INC.

## (undated) DEVICE — GAUZE SPONGES,16 PLY: Brand: CURITY

## (undated) DEVICE — TUBING IRRIG L77IN DIA0.241IN L BOR FOR CYSTO W/ NVENT

## (undated) DEVICE — INTENDED FOR TISSUE SEPARATION, AND OTHER PROCEDURES THAT REQUIRE A SHARP SURGICAL BLADE TO PUNCTURE OR CUT.: Brand: BARD-PARKER ®  SAFETY SCALPED

## (undated) DEVICE — GOWN,REINFORCED,POLY,AURORA,XLARGE,STRL: Brand: MEDLINE

## (undated) DEVICE — BONEE® NEEDLE FOR BLADDER INJECTION CH FR 05, 22G, 35 CM, BOX OF 1: Brand: PORGES COLOPLAST

## (undated) DEVICE — DBD-PACK,LAPAROTOMY,2 REINFORCED GOWNS: Brand: MEDLINE

## (undated) DEVICE — PROGRAMMER NEUROSTIMULATOR PT FOR URIN CTRL INTERSTIM ICON

## (undated) DEVICE — (D)GLOVE SURG ORTH 7.5 PWD LTX -- DISC BY MFR USE ITEM 278014

## (undated) DEVICE — KIT CLN UP BON SECOURS MARYV

## (undated) DEVICE — REM POLYHESIVE ADULT PATIENT RETURN ELECTRODE: Brand: VALLEYLAB

## (undated) DEVICE — 3M™ STERI-DRAPE™ INCISE DRAPE 1050 (60CM X 45CM): Brand: STERI-DRAPE™

## (undated) DEVICE — GAUZE SPONGES,12 PLY: Brand: CURITY

## (undated) DEVICE — (D)STRIP SKN CLSR 0.5X4IN WHT --

## (undated) DEVICE — 3M™ BAIR PAWS FLEX™ WARMING GOWN, STANDARD, 20 PER CASE 81003: Brand: BAIR PAWS™

## (undated) DEVICE — SUTURE MCRYL SZ 4-0 L27IN ABSRB UD L24MM PS-1 3/8 CIR PRIM Y935H

## (undated) DEVICE — TRAY PREP DRY W/ PREM GLV 2 APPL 6 SPNG 2 UNDPD 1 OVERWRAP

## (undated) DEVICE — SOLUTION IV 1000ML 0.9% SOD CHL

## (undated) DEVICE — MASTISOL ADHESIVE LIQ 2/3ML

## (undated) DEVICE — SOFT SILICONE HYDROCELLULAR SACRUM DRESSING WITH LOCK AWAY LAYER: Brand: ALLEVYN LIFE SACRUM (LARGE) PACK OF 10

## (undated) DEVICE — (D)SYR 10ML 1/5ML GRAD NSAF -- PKGING CHANGE USE ITEM 338027

## (undated) DEVICE — DRAPE XR C ARM 41X74IN LF --